# Patient Record
Sex: FEMALE | Race: WHITE | NOT HISPANIC OR LATINO | Employment: FULL TIME | URBAN - METROPOLITAN AREA
[De-identification: names, ages, dates, MRNs, and addresses within clinical notes are randomized per-mention and may not be internally consistent; named-entity substitution may affect disease eponyms.]

---

## 2017-02-07 ENCOUNTER — HOSPITAL ENCOUNTER (OUTPATIENT)
Dept: FAMILY MEDICINE CLINIC | Facility: CLINIC | Age: 52
Setting detail: SPECIMEN
Discharge: HOME | End: 2017-02-07
Attending: FAMILY MEDICINE | Admitting: FAMILY MEDICINE

## 2017-02-07 LAB
ALBUMIN SERPL-MCNC: 3.9 G/DL (ref 3.5–4.8)
ALBUMIN/GLOB SERPL: 1.6 {RATIO} (ref 1–1.7)
ALP SERPL-CCNC: 54 IU/L (ref 32–91)
ALT SERPL-CCNC: 19 IU/L (ref 14–54)
ANION GAP SERPL CALC-SCNC: 12.5 MMOL/L (ref 10–20)
AST SERPL-CCNC: 21 IU/L (ref 15–41)
BILIRUB SERPL-MCNC: 0.6 MG/DL (ref 0.3–1.2)
BUN SERPL-MCNC: 13 MG/DL (ref 8–20)
BUN/CREAT SERPL: 21.7 (ref 5.4–26.2)
CALCIUM SERPL-MCNC: 9.7 MG/DL (ref 8.9–10.3)
CHLORIDE SERPL-SCNC: 105 MMOL/L (ref 101–111)
CHOLEST SERPL-MCNC: 203 MG/DL
CHOLEST/HDLC SERPL: 3.2 {RATIO}
CONV CO2: 29 MMOL/L (ref 22–32)
CONV LDL CHOLESTEROL DIRECT: 119 MG/DL (ref 0–100)
CONV TOTAL PROTEIN: 6.3 G/DL (ref 6.1–7.9)
CREAT UR-MCNC: 0.6 MG/DL (ref 0.4–1)
GLOBULIN UR ELPH-MCNC: 2.4 G/DL (ref 2.5–3.8)
GLUCOSE SERPL-MCNC: 118 MG/DL (ref 65–99)
HDLC SERPL-MCNC: 64 MG/DL
LDLC/HDLC SERPL: 1.9 {RATIO}
LIPID INTERPRETATION: ABNORMAL
POTASSIUM SERPL-SCNC: 3.5 MMOL/L (ref 3.6–5.1)
SODIUM SERPL-SCNC: 143 MMOL/L (ref 136–144)
T3FREE SERPL-MCNC: 3.24 PG/ML (ref 2.39–6.79)
T4 FREE SERPL-MCNC: 1.32 NG/DL (ref 0.58–1.64)
TRIGL SERPL-MCNC: 78 MG/DL
TSH SERPL-ACNC: 0.07 UIU/ML (ref 0.34–5.6)
VLDLC SERPL CALC-MCNC: 20.8 MG/DL

## 2018-02-01 ENCOUNTER — HOSPITAL ENCOUNTER (OUTPATIENT)
Dept: MAMMOGRAPHY | Facility: HOSPITAL | Age: 53
Discharge: HOME | End: 2018-02-01
Attending: FAMILY MEDICINE | Admitting: FAMILY MEDICINE

## 2018-04-02 ENCOUNTER — HOSPITAL ENCOUNTER (OUTPATIENT)
Dept: FAMILY MEDICINE CLINIC | Facility: CLINIC | Age: 53
Setting detail: SPECIMEN
Discharge: HOME | End: 2018-04-02
Attending: FAMILY MEDICINE | Admitting: FAMILY MEDICINE

## 2018-04-02 LAB
ALBUMIN SERPL-MCNC: 4.1 G/DL (ref 3.5–4.8)
ALBUMIN/GLOB SERPL: 1.6 {RATIO} (ref 1–1.7)
ALP SERPL-CCNC: 59 IU/L (ref 32–91)
ALT SERPL-CCNC: 27 IU/L (ref 14–54)
ANION GAP SERPL CALC-SCNC: 11.5 MMOL/L (ref 10–20)
AST SERPL-CCNC: 25 IU/L (ref 15–41)
BILIRUB SERPL-MCNC: 0.2 MG/DL (ref 0.3–1.2)
BUN SERPL-MCNC: 13 MG/DL (ref 8–20)
BUN/CREAT SERPL: 18.6 (ref 5.4–26.2)
CALCIUM SERPL-MCNC: 9.4 MG/DL (ref 8.9–10.3)
CHLORIDE SERPL-SCNC: 101 MMOL/L (ref 101–111)
CHOLEST SERPL-MCNC: 187 MG/DL
CHOLEST/HDLC SERPL: 3.1 {RATIO}
CONV CO2: 31 MMOL/L (ref 22–32)
CONV LDL CHOLESTEROL DIRECT: 107 MG/DL (ref 0–100)
CONV TOTAL PROTEIN: 6.7 G/DL (ref 6.1–7.9)
CREAT UR-MCNC: 0.7 MG/DL (ref 0.4–1)
GLOBULIN UR ELPH-MCNC: 2.6 G/DL (ref 2.5–3.8)
GLUCOSE SERPL-MCNC: 106 MG/DL (ref 65–99)
HDLC SERPL-MCNC: 60 MG/DL
LDLC/HDLC SERPL: 1.8 {RATIO}
LIPID INTERPRETATION: ABNORMAL
POTASSIUM SERPL-SCNC: 3.5 MMOL/L (ref 3.6–5.1)
SODIUM SERPL-SCNC: 140 MMOL/L (ref 136–144)
TRIGL SERPL-MCNC: 75 MG/DL
VLDLC SERPL CALC-MCNC: 20.3 MG/DL

## 2019-04-11 ENCOUNTER — HOSPITAL ENCOUNTER (OUTPATIENT)
Dept: FAMILY MEDICINE CLINIC | Facility: CLINIC | Age: 54
Setting detail: SPECIMEN
Discharge: HOME | End: 2019-04-11
Attending: FAMILY MEDICINE | Admitting: FAMILY MEDICINE

## 2019-04-11 LAB
ALBUMIN SERPL-MCNC: 4.1 G/DL (ref 3.5–4.8)
ALBUMIN/GLOB SERPL: 1.5 {RATIO} (ref 1–1.7)
ALP SERPL-CCNC: 50 IU/L (ref 32–91)
ALT SERPL-CCNC: 14 IU/L (ref 14–54)
ANION GAP SERPL CALC-SCNC: 14.1 MMOL/L (ref 10–20)
AST SERPL-CCNC: 18 IU/L (ref 15–41)
BASOPHILS # BLD AUTO: 0 10*3/UL (ref 0–0.2)
BASOPHILS NFR BLD AUTO: 0 % (ref 0–2)
BILIRUB SERPL-MCNC: 0.7 MG/DL (ref 0.3–1.2)
BUN SERPL-MCNC: 14 MG/DL (ref 8–20)
BUN/CREAT SERPL: 20 (ref 5.4–26.2)
CALCIUM SERPL-MCNC: 9.3 MG/DL (ref 8.9–10.3)
CHLORIDE SERPL-SCNC: 102 MMOL/L (ref 101–111)
CHOLEST SERPL-MCNC: 199 MG/DL
CHOLEST/HDLC SERPL: 3.2 {RATIO}
CONV CO2: 28 MMOL/L (ref 22–32)
CONV LDL CHOLESTEROL DIRECT: 125 MG/DL (ref 0–100)
CONV TOTAL PROTEIN: 6.8 G/DL (ref 6.1–7.9)
CREAT UR-MCNC: 0.7 MG/DL (ref 0.4–1)
DIFFERENTIAL METHOD BLD: (no result)
EOSINOPHIL # BLD AUTO: 0.1 10*3/UL (ref 0–0.3)
EOSINOPHIL # BLD AUTO: 1 % (ref 0–3)
ERYTHROCYTE [DISTWIDTH] IN BLOOD BY AUTOMATED COUNT: 13.3 % (ref 11.5–14.5)
GLOBULIN UR ELPH-MCNC: 2.7 G/DL (ref 2.5–3.8)
GLUCOSE SERPL-MCNC: 113 MG/DL (ref 65–99)
HCT VFR BLD AUTO: 41.6 % (ref 35–49)
HDLC SERPL-MCNC: 62 MG/DL
HGB BLD-MCNC: 14 G/DL (ref 12–15)
LDLC/HDLC SERPL: 2 {RATIO}
LIPID INTERPRETATION: ABNORMAL
LYMPHOCYTES # BLD AUTO: 2.1 10*3/UL (ref 0.8–4.8)
LYMPHOCYTES NFR BLD AUTO: 23 % (ref 18–42)
MCH RBC QN AUTO: 31.2 PG (ref 26–32)
MCHC RBC AUTO-ENTMCNC: 33.5 G/DL (ref 32–36)
MCV RBC AUTO: 93.1 FL (ref 80–94)
MONOCYTES # BLD AUTO: 0.5 10*3/UL (ref 0.1–1.3)
MONOCYTES NFR BLD AUTO: 6 % (ref 2–11)
NEUTROPHILS # BLD AUTO: 6.6 10*3/UL (ref 2.3–8.6)
NEUTROPHILS NFR BLD AUTO: 70 % (ref 50–75)
NRBC BLD AUTO-RTO: 0 /100{WBCS}
NRBC/RBC NFR BLD MANUAL: 0 10*3/UL
PLATELET # BLD AUTO: 236 10*3/UL (ref 150–450)
PMV BLD AUTO: 10.5 FL (ref 7.4–10.4)
POTASSIUM SERPL-SCNC: 4.1 MMOL/L (ref 3.6–5.1)
RBC # BLD AUTO: 4.47 10*6/UL (ref 4–5.4)
SODIUM SERPL-SCNC: 140 MMOL/L (ref 136–144)
TRIGL SERPL-MCNC: 67 MG/DL
VLDLC SERPL CALC-MCNC: 12.6 MG/DL
WBC # BLD AUTO: 9.4 10*3/UL (ref 4.5–11.5)

## 2019-04-22 ENCOUNTER — HOSPITAL ENCOUNTER (OUTPATIENT)
Dept: MAMMOGRAPHY | Facility: HOSPITAL | Age: 54
Discharge: HOME | End: 2019-04-22

## 2019-08-24 RX ORDER — LEVOTHYROXINE SODIUM 88 UG/1
TABLET ORAL
Qty: 30 TABLET | Refills: 1 | Status: SHIPPED | OUTPATIENT
Start: 2019-08-24 | End: 2019-10-22 | Stop reason: SDUPTHER

## 2019-08-24 RX ORDER — LISINOPRIL AND HYDROCHLOROTHIAZIDE 25; 20 MG/1; MG/1
TABLET ORAL
Qty: 30 TABLET | Refills: 1 | Status: SHIPPED | OUTPATIENT
Start: 2019-08-24 | End: 2019-10-22 | Stop reason: SDUPTHER

## 2019-10-22 RX ORDER — LISINOPRIL AND HYDROCHLOROTHIAZIDE 25; 20 MG/1; MG/1
TABLET ORAL
Qty: 30 TABLET | Refills: 0 | Status: SHIPPED | OUTPATIENT
Start: 2019-10-22 | End: 2019-11-22 | Stop reason: SDUPTHER

## 2019-10-22 RX ORDER — LEVOTHYROXINE SODIUM 88 UG/1
TABLET ORAL
Qty: 30 TABLET | Refills: 0 | Status: SHIPPED | OUTPATIENT
Start: 2019-10-22 | End: 2019-11-22 | Stop reason: SDUPTHER

## 2019-11-23 RX ORDER — LISINOPRIL AND HYDROCHLOROTHIAZIDE 25; 20 MG/1; MG/1
TABLET ORAL
Qty: 30 TABLET | Refills: 0 | Status: SHIPPED | OUTPATIENT
Start: 2019-11-23 | End: 2019-12-19

## 2019-11-23 RX ORDER — LEVOTHYROXINE SODIUM 88 UG/1
TABLET ORAL
Qty: 30 TABLET | Refills: 0 | Status: SHIPPED | OUTPATIENT
Start: 2019-11-23 | End: 2019-12-19

## 2019-12-19 RX ORDER — LEVOTHYROXINE SODIUM 88 UG/1
TABLET ORAL
Qty: 30 TABLET | Refills: 0 | Status: SHIPPED | OUTPATIENT
Start: 2019-12-19 | End: 2020-01-21

## 2019-12-19 RX ORDER — LISINOPRIL AND HYDROCHLOROTHIAZIDE 25; 20 MG/1; MG/1
TABLET ORAL
Qty: 30 TABLET | Refills: 0 | Status: SHIPPED | OUTPATIENT
Start: 2019-12-19 | End: 2020-01-21

## 2020-01-21 RX ORDER — LISINOPRIL AND HYDROCHLOROTHIAZIDE 25; 20 MG/1; MG/1
TABLET ORAL
Qty: 30 TABLET | Refills: 0 | Status: SHIPPED | OUTPATIENT
Start: 2020-01-21 | End: 2020-02-27 | Stop reason: SDUPTHER

## 2020-01-21 RX ORDER — LEVOTHYROXINE SODIUM 88 UG/1
TABLET ORAL
Qty: 30 TABLET | Refills: 0 | Status: SHIPPED | OUTPATIENT
Start: 2020-01-21 | End: 2020-02-21

## 2020-02-21 RX ORDER — LEVOTHYROXINE SODIUM 88 UG/1
TABLET ORAL
Qty: 30 TABLET | Refills: 0 | Status: SHIPPED | OUTPATIENT
Start: 2020-02-21 | End: 2020-03-19

## 2020-02-27 RX ORDER — LISINOPRIL AND HYDROCHLOROTHIAZIDE 25; 20 MG/1; MG/1
1 TABLET ORAL DAILY
Qty: 30 TABLET | Refills: 3 | Status: SHIPPED | OUTPATIENT
Start: 2020-02-27 | End: 2020-06-21

## 2020-02-28 ENCOUNTER — OFFICE VISIT (OUTPATIENT)
Dept: FAMILY MEDICINE CLINIC | Facility: CLINIC | Age: 55
End: 2020-02-28

## 2020-02-28 ENCOUNTER — LAB (OUTPATIENT)
Dept: FAMILY MEDICINE CLINIC | Facility: CLINIC | Age: 55
End: 2020-02-28

## 2020-02-28 VITALS
HEIGHT: 62 IN | DIASTOLIC BLOOD PRESSURE: 68 MMHG | RESPIRATION RATE: 16 BRPM | TEMPERATURE: 98 F | OXYGEN SATURATION: 95 % | HEART RATE: 86 BPM | WEIGHT: 153.2 LBS | BODY MASS INDEX: 28.19 KG/M2 | SYSTOLIC BLOOD PRESSURE: 111 MMHG

## 2020-02-28 DIAGNOSIS — I10 ESSENTIAL HYPERTENSION: Primary | ICD-10-CM

## 2020-02-28 DIAGNOSIS — E03.9 HYPOTHYROIDISM, UNSPECIFIED TYPE: ICD-10-CM

## 2020-02-28 DIAGNOSIS — I10 ESSENTIAL HYPERTENSION: ICD-10-CM

## 2020-02-28 DIAGNOSIS — F17.200 TOBACCO DEPENDENCE: ICD-10-CM

## 2020-02-28 LAB
ALBUMIN SERPL-MCNC: 4.3 G/DL (ref 3.5–5.2)
ALBUMIN/GLOB SERPL: 2 G/DL
ALP SERPL-CCNC: 50 U/L (ref 39–117)
ALT SERPL W P-5'-P-CCNC: 15 U/L (ref 1–33)
ANION GAP SERPL CALCULATED.3IONS-SCNC: 10.9 MMOL/L (ref 5–15)
AST SERPL-CCNC: 15 U/L (ref 1–32)
BILIRUB SERPL-MCNC: 0.4 MG/DL (ref 0.2–1.2)
BUN BLD-MCNC: 17 MG/DL (ref 6–20)
BUN/CREAT SERPL: 29.8 (ref 7–25)
CALCIUM SPEC-SCNC: 9.2 MG/DL (ref 8.6–10.5)
CHLORIDE SERPL-SCNC: 103 MMOL/L (ref 98–107)
CHOLEST SERPL-MCNC: 178 MG/DL (ref 0–200)
CO2 SERPL-SCNC: 27.1 MMOL/L (ref 22–29)
CREAT BLD-MCNC: 0.57 MG/DL (ref 0.57–1)
GFR SERPL CREATININE-BSD FRML MDRD: 110 ML/MIN/1.73
GLOBULIN UR ELPH-MCNC: 2.2 GM/DL
GLUCOSE BLD-MCNC: 116 MG/DL (ref 65–99)
HDLC SERPL-MCNC: 61 MG/DL (ref 40–60)
LDLC SERPL CALC-MCNC: 100 MG/DL (ref 0–100)
LDLC/HDLC SERPL: 1.64 {RATIO}
POTASSIUM BLD-SCNC: 3.7 MMOL/L (ref 3.5–5.2)
PROT SERPL-MCNC: 6.5 G/DL (ref 6–8.5)
SODIUM BLD-SCNC: 141 MMOL/L (ref 136–145)
TRIGL SERPL-MCNC: 84 MG/DL (ref 0–150)
TSH SERPL DL<=0.05 MIU/L-ACNC: 1.05 UIU/ML (ref 0.27–4.2)
VLDLC SERPL-MCNC: 16.8 MG/DL (ref 5–40)

## 2020-02-28 PROCEDURE — 80061 LIPID PANEL: CPT | Performed by: NURSE PRACTITIONER

## 2020-02-28 PROCEDURE — 80053 COMPREHEN METABOLIC PANEL: CPT | Performed by: NURSE PRACTITIONER

## 2020-02-28 PROCEDURE — 84443 ASSAY THYROID STIM HORMONE: CPT | Performed by: NURSE PRACTITIONER

## 2020-02-28 PROCEDURE — 99214 OFFICE O/P EST MOD 30 MIN: CPT | Performed by: NURSE PRACTITIONER

## 2020-02-28 PROCEDURE — 36415 COLL VENOUS BLD VENIPUNCTURE: CPT

## 2020-02-28 NOTE — PROGRESS NOTES
Subjective   Kirby Gunter is a 54 y.o. female.       HPI   Pt. Is here today to follow up on current medications.    1) HTN - currently takes lisinopril-hctz 20/25 daily.  BP running in normal range at home;  BP today 111/68.  Denies any Cp; palpitations; SOA; dizziness; headache; trouble with vision.  Continues to smoke.  Limits caffeine and sodium intake.      2) Hypothyroid - currently takes levothyroxine 88 mcg daily.  No concerns. Denies any weight changes; fatigue; skin changes.     3) Cigarette smoker - continue to smoke but feels she might be ready to quit.  Have reviewed smoking cessation options.  Pt. Considering trying nicotine gum.      The following portions of the patient's history were reviewed and updated as appropriate: allergies, current medications, past family history, past medical history, past social history, past surgical history and problem list.    Review of Systems   Constitutional: Negative for activity change, appetite change, chills, diaphoresis, fatigue, fever, unexpected weight gain and unexpected weight loss.   HENT: Negative for congestion, postnasal drip, rhinorrhea, sinus pressure, sore throat and trouble swallowing.    Eyes: Negative for blurred vision, double vision and visual disturbance.   Respiratory: Negative for cough, chest tightness, shortness of breath and wheezing.    Cardiovascular: Negative for chest pain, palpitations and leg swelling.   Gastrointestinal: Negative for abdominal pain, blood in stool, constipation, diarrhea, nausea, vomiting and indigestion.   Genitourinary: Negative for dysuria, flank pain, frequency, hematuria and urgency.   Musculoskeletal: Negative for arthralgias and myalgias.   Skin: Negative for dry skin, rash and skin lesions.   Neurological: Negative for dizziness, syncope, weakness, light-headedness, headache and confusion.   Hematological: Negative for adenopathy.   Psychiatric/Behavioral: Negative for depressed mood. The patient is not  nervous/anxious.        Objective   Physical Exam   Constitutional: She is oriented to person, place, and time. She appears well-developed and well-nourished. No distress.   HENT:   Head: Normocephalic and atraumatic.   Right Ear: Hearing, external ear and ear canal normal.   Left Ear: Hearing, external ear and ear canal normal.   Nose: Nose normal.   Mouth/Throat: Uvula is midline, oropharynx is clear and moist and mucous membranes are normal.   Eyes: Pupils are equal, round, and reactive to light. Conjunctivae and EOM are normal.   Neck: No JVD present. No thyromegaly present.   Cardiovascular: Normal rate, regular rhythm, normal heart sounds and intact distal pulses.   No murmur heard.  Pulmonary/Chest: Effort normal and breath sounds normal. No respiratory distress. She has no wheezes. She exhibits no tenderness.   Abdominal: Soft. Bowel sounds are normal. She exhibits no distension. There is no tenderness.   Musculoskeletal: She exhibits no edema.   Lymphadenopathy:     She has no cervical adenopathy.   Neurological: She is alert and oriented to person, place, and time.   Skin: Skin is warm and dry. Capillary refill takes less than 2 seconds. No rash noted. No erythema.   Psychiatric: She has a normal mood and affect.   Vitals reviewed.        Assessment/Plan   Kirby was seen today for hypertension and thyroid problem.    Diagnoses and all orders for this visit:    Essential hypertension  Comments:  Stable  Labs today  Cont. current medication  Work on smoking cessation  Orders:  -     Comprehensive metabolic panel; Future  -     Lipid panel; Future    Hypothyroidism, unspecified type  Comments:  Stable  Labs today  Continue current medication    Orders:  -     TSH; Future    Tobacco dependence  Comments:  Advised smoking cessation  Reviewed smoking cessation options  Pt. wants to try nicotine gum

## 2020-03-19 RX ORDER — LEVOTHYROXINE SODIUM 88 UG/1
TABLET ORAL
Qty: 30 TABLET | Refills: 3 | Status: SHIPPED | OUTPATIENT
Start: 2020-03-19 | End: 2020-07-19

## 2020-06-21 RX ORDER — LISINOPRIL AND HYDROCHLOROTHIAZIDE 25; 20 MG/1; MG/1
TABLET ORAL
Qty: 30 TABLET | Refills: 4 | Status: SHIPPED | OUTPATIENT
Start: 2020-06-21 | End: 2020-11-17

## 2020-07-19 RX ORDER — LEVOTHYROXINE SODIUM 88 UG/1
TABLET ORAL
Qty: 30 TABLET | Refills: 2 | Status: SHIPPED | OUTPATIENT
Start: 2020-07-19 | End: 2020-10-17

## 2020-08-28 ENCOUNTER — OFFICE VISIT (OUTPATIENT)
Dept: FAMILY MEDICINE CLINIC | Facility: CLINIC | Age: 55
End: 2020-08-28

## 2020-08-28 ENCOUNTER — LAB (OUTPATIENT)
Dept: FAMILY MEDICINE CLINIC | Facility: CLINIC | Age: 55
End: 2020-08-28

## 2020-08-28 VITALS
HEIGHT: 62 IN | DIASTOLIC BLOOD PRESSURE: 76 MMHG | OXYGEN SATURATION: 95 % | WEIGHT: 149 LBS | TEMPERATURE: 97.1 F | HEART RATE: 80 BPM | SYSTOLIC BLOOD PRESSURE: 122 MMHG | BODY MASS INDEX: 27.42 KG/M2

## 2020-08-28 DIAGNOSIS — I10 ESSENTIAL HYPERTENSION: Primary | ICD-10-CM

## 2020-08-28 DIAGNOSIS — B09 VIRAL RASH: ICD-10-CM

## 2020-08-28 DIAGNOSIS — E03.9 HYPOTHYROIDISM, UNSPECIFIED TYPE: ICD-10-CM

## 2020-08-28 LAB
ALBUMIN SERPL-MCNC: 4.1 G/DL (ref 3.5–5.2)
ALBUMIN/GLOB SERPL: 1.6 G/DL
ALP SERPL-CCNC: 49 U/L (ref 39–117)
ALT SERPL W P-5'-P-CCNC: 12 U/L (ref 1–33)
ANION GAP SERPL CALCULATED.3IONS-SCNC: 10 MMOL/L (ref 5–15)
AST SERPL-CCNC: 12 U/L (ref 1–32)
BASOPHILS # BLD AUTO: 0.04 10*3/MM3 (ref 0–0.2)
BASOPHILS NFR BLD AUTO: 0.5 % (ref 0–1.5)
BILIRUB SERPL-MCNC: 0.3 MG/DL (ref 0–1.2)
BUN SERPL-MCNC: 17 MG/DL (ref 6–20)
BUN/CREAT SERPL: 28.8 (ref 7–25)
CALCIUM SPEC-SCNC: 9.4 MG/DL (ref 8.6–10.5)
CHLORIDE SERPL-SCNC: 105 MMOL/L (ref 98–107)
CHOLEST SERPL-MCNC: 158 MG/DL (ref 0–200)
CO2 SERPL-SCNC: 27 MMOL/L (ref 22–29)
CREAT SERPL-MCNC: 0.59 MG/DL (ref 0.57–1)
DEPRECATED RDW RBC AUTO: 40.4 FL (ref 37–54)
EOSINOPHIL # BLD AUTO: 0.14 10*3/MM3 (ref 0–0.4)
EOSINOPHIL NFR BLD AUTO: 1.7 % (ref 0.3–6.2)
ERYTHROCYTE [DISTWIDTH] IN BLOOD BY AUTOMATED COUNT: 12.3 % (ref 12.3–15.4)
GFR SERPL CREATININE-BSD FRML MDRD: 105 ML/MIN/1.73
GLOBULIN UR ELPH-MCNC: 2.6 GM/DL
GLUCOSE SERPL-MCNC: 106 MG/DL (ref 65–99)
HCT VFR BLD AUTO: 37.5 % (ref 34–46.6)
HDLC SERPL-MCNC: 55 MG/DL (ref 40–60)
HGB BLD-MCNC: 12.7 G/DL (ref 12–15.9)
IMM GRANULOCYTES # BLD AUTO: 0.02 10*3/MM3 (ref 0–0.05)
IMM GRANULOCYTES NFR BLD AUTO: 0.2 % (ref 0–0.5)
LDLC SERPL CALC-MCNC: 90 MG/DL (ref 0–100)
LDLC/HDLC SERPL: 1.64 {RATIO}
LYMPHOCYTES # BLD AUTO: 2.58 10*3/MM3 (ref 0.7–3.1)
LYMPHOCYTES NFR BLD AUTO: 30.5 % (ref 19.6–45.3)
MCH RBC QN AUTO: 30.5 PG (ref 26.6–33)
MCHC RBC AUTO-ENTMCNC: 33.9 G/DL (ref 31.5–35.7)
MCV RBC AUTO: 90.1 FL (ref 79–97)
MONOCYTES # BLD AUTO: 0.64 10*3/MM3 (ref 0.1–0.9)
MONOCYTES NFR BLD AUTO: 7.6 % (ref 5–12)
NEUTROPHILS NFR BLD AUTO: 5.05 10*3/MM3 (ref 1.7–7)
NEUTROPHILS NFR BLD AUTO: 59.5 % (ref 42.7–76)
NRBC BLD AUTO-RTO: 0 /100 WBC (ref 0–0.2)
PLATELET # BLD AUTO: 214 10*3/MM3 (ref 140–450)
PMV BLD AUTO: 12.4 FL (ref 6–12)
POTASSIUM SERPL-SCNC: 3.6 MMOL/L (ref 3.5–5.2)
PROT SERPL-MCNC: 6.7 G/DL (ref 6–8.5)
RBC # BLD AUTO: 4.16 10*6/MM3 (ref 3.77–5.28)
SODIUM SERPL-SCNC: 142 MMOL/L (ref 136–145)
T3FREE SERPL-MCNC: 2.62 PG/ML (ref 2–4.4)
T4 FREE SERPL-MCNC: 1.8 NG/DL (ref 0.93–1.7)
TRIGL SERPL-MCNC: 65 MG/DL (ref 0–150)
TSH SERPL DL<=0.05 MIU/L-ACNC: 0.57 UIU/ML (ref 0.27–4.2)
VLDLC SERPL-MCNC: 13 MG/DL (ref 5–40)
WBC # BLD AUTO: 8.47 10*3/MM3 (ref 3.4–10.8)

## 2020-08-28 PROCEDURE — 84439 ASSAY OF FREE THYROXINE: CPT | Performed by: FAMILY MEDICINE

## 2020-08-28 PROCEDURE — 99213 OFFICE O/P EST LOW 20 MIN: CPT | Performed by: FAMILY MEDICINE

## 2020-08-28 PROCEDURE — 85025 COMPLETE CBC W/AUTO DIFF WBC: CPT | Performed by: FAMILY MEDICINE

## 2020-08-28 PROCEDURE — 80061 LIPID PANEL: CPT | Performed by: FAMILY MEDICINE

## 2020-08-28 PROCEDURE — 36415 COLL VENOUS BLD VENIPUNCTURE: CPT | Performed by: FAMILY MEDICINE

## 2020-08-28 PROCEDURE — 84481 FREE ASSAY (FT-3): CPT | Performed by: FAMILY MEDICINE

## 2020-08-28 PROCEDURE — 84443 ASSAY THYROID STIM HORMONE: CPT | Performed by: FAMILY MEDICINE

## 2020-08-28 PROCEDURE — 80053 COMPREHEN METABOLIC PANEL: CPT | Performed by: FAMILY MEDICINE

## 2020-08-28 RX ORDER — ACYCLOVIR 50 MG/G
OINTMENT TOPICAL 4 TIMES DAILY
Qty: 15 G | Refills: 1 | Status: SHIPPED | OUTPATIENT
Start: 2020-08-28

## 2020-08-28 NOTE — PROGRESS NOTES
"Subjective   Kirby Gunter is a 55 y.o. female.     She is in for follow-up on her high blood pressure and hypothyroidism.  She has been taking her medication and feels good in general.  She denies any issue with bowel or bladder function.  She denies any chest pain or difficulty breathing.  She denies any issue with sleep or mood.  She denies any dizziness or lightheadedness.  She states she is independent and functional with all of her ADLs.  She denies any fever or illness symptoms.  She has noticed a mild recurring rash on the palmar surface of her left hand, which she describes as blisters and painful at times.         /76 (BP Location: Left arm, Patient Position: Sitting, Cuff Size: Small Adult)   Pulse 80   Temp 97.1 °F (36.2 °C) (Infrared)   Ht 157.5 cm (62\")   Wt 67.6 kg (149 lb)   SpO2 95%   BMI 27.25 kg/m²       Chief Complaint   Patient presents with   • Hypertension     f/u on bp and thyroid    • Hypothyroidism           Current Outpatient Medications:   •  levothyroxine (SYNTHROID, LEVOTHROID) 88 MCG tablet, TAKE ONE TABLET BY MOUTH DAILY, Disp: 30 tablet, Rfl: 2  •  lisinopril-hydrochlorothiazide (PRINZIDE,ZESTORETIC) 20-25 MG per tablet, TAKE ONE TABLET BY MOUTH DAILY, Disp: 30 tablet, Rfl: 4        The following portions of the patient's history were reviewed and updated as appropriate: allergies, current medications, past family history, past medical history, past social history, past surgical history and problem list.    Review of Systems   Constitutional: Negative for activity change, fatigue and fever.   HENT: Negative for congestion, sinus pressure, sinus pain, sore throat and trouble swallowing.    Eyes: Negative for visual disturbance.   Respiratory: Negative for chest tightness, shortness of breath and wheezing.    Cardiovascular: Negative for chest pain.   Gastrointestinal: Negative for abdominal distention, abdominal pain, constipation, diarrhea, nausea and vomiting. "   Genitourinary: Negative for difficulty urinating and dysuria.   Musculoskeletal: Negative for back pain and neck pain.   Skin: Positive for rash (Left hand only).   Psychiatric/Behavioral: Negative for agitation, hallucinations and suicidal ideas.       Objective   Physical Exam   Constitutional: She is oriented to person, place, and time. No distress.   Neck: Neck supple. No thyromegaly present.   Cardiovascular: Normal rate, regular rhythm and normal heart sounds.   Pulmonary/Chest: Effort normal and breath sounds normal.   Abdominal: Soft. Bowel sounds are normal.   Musculoskeletal: She exhibits no edema or deformity.   Lymphadenopathy:     She has no cervical adenopathy.   Neurological: She is alert and oriented to person, place, and time.   Skin: Rash (Viral blisters on palmar surface of her left hand) noted.   Nursing note and vitals reviewed.        Assessment/Plan   Problems Addressed this Visit        Cardiovascular and Mediastinum    Essential hypertension - Primary    Relevant Orders    Comprehensive metabolic panel    Lipid panel       Endocrine    Hypothyroidism    Relevant Orders    TSH    T4, free    T3, free    CBC w AUTO Differential      Other Visit Diagnoses     Viral rash            I will update appropriate labs today and adjust treatment plan as indicated  I will send in a prescription for topical antiviral for the rash on her hand, as I think it may be zoster but I am not sure  She is to call if there are new issues or if the rash does not resolve as intended  Otherwise, I will see her back in 6 months

## 2020-10-17 RX ORDER — LEVOTHYROXINE SODIUM 88 UG/1
TABLET ORAL
Qty: 30 TABLET | Refills: 1 | Status: SHIPPED | OUTPATIENT
Start: 2020-10-17 | End: 2020-12-15

## 2020-11-17 RX ORDER — LISINOPRIL AND HYDROCHLOROTHIAZIDE 25; 20 MG/1; MG/1
TABLET ORAL
Qty: 30 TABLET | Refills: 3 | Status: SHIPPED | OUTPATIENT
Start: 2020-11-17 | End: 2021-03-17

## 2020-12-15 RX ORDER — LEVOTHYROXINE SODIUM 88 UG/1
TABLET ORAL
Qty: 30 TABLET | Refills: 3 | Status: SHIPPED | OUTPATIENT
Start: 2020-12-15 | End: 2021-04-16

## 2021-03-01 ENCOUNTER — OFFICE VISIT (OUTPATIENT)
Dept: FAMILY MEDICINE CLINIC | Facility: CLINIC | Age: 56
End: 2021-03-01

## 2021-03-01 ENCOUNTER — LAB (OUTPATIENT)
Dept: FAMILY MEDICINE CLINIC | Facility: CLINIC | Age: 56
End: 2021-03-01

## 2021-03-01 VITALS
DIASTOLIC BLOOD PRESSURE: 82 MMHG | HEART RATE: 80 BPM | BODY MASS INDEX: 27.05 KG/M2 | TEMPERATURE: 97.8 F | OXYGEN SATURATION: 94 % | SYSTOLIC BLOOD PRESSURE: 147 MMHG | WEIGHT: 147 LBS | HEIGHT: 62 IN

## 2021-03-01 DIAGNOSIS — I10 ESSENTIAL HYPERTENSION: Primary | ICD-10-CM

## 2021-03-01 DIAGNOSIS — E03.9 HYPOTHYROIDISM, UNSPECIFIED TYPE: ICD-10-CM

## 2021-03-01 LAB
ALBUMIN SERPL-MCNC: 4.2 G/DL (ref 3.5–5.2)
ALBUMIN/GLOB SERPL: 1.8 G/DL
ALP SERPL-CCNC: 57 U/L (ref 39–117)
ALT SERPL W P-5'-P-CCNC: 18 U/L (ref 1–33)
ANION GAP SERPL CALCULATED.3IONS-SCNC: 7.6 MMOL/L (ref 5–15)
AST SERPL-CCNC: 15 U/L (ref 1–32)
BASOPHILS # BLD AUTO: 0.05 10*3/MM3 (ref 0–0.2)
BASOPHILS NFR BLD AUTO: 0.5 % (ref 0–1.5)
BILIRUB SERPL-MCNC: 0.3 MG/DL (ref 0–1.2)
BUN SERPL-MCNC: 18 MG/DL (ref 6–20)
BUN/CREAT SERPL: 26.1 (ref 7–25)
CALCIUM SPEC-SCNC: 9.4 MG/DL (ref 8.6–10.5)
CHLORIDE SERPL-SCNC: 103 MMOL/L (ref 98–107)
CHOLEST SERPL-MCNC: 169 MG/DL (ref 0–200)
CO2 SERPL-SCNC: 31.4 MMOL/L (ref 22–29)
CREAT SERPL-MCNC: 0.69 MG/DL (ref 0.57–1)
DEPRECATED RDW RBC AUTO: 39.9 FL (ref 37–54)
EOSINOPHIL # BLD AUTO: 0.16 10*3/MM3 (ref 0–0.4)
EOSINOPHIL NFR BLD AUTO: 1.6 % (ref 0.3–6.2)
ERYTHROCYTE [DISTWIDTH] IN BLOOD BY AUTOMATED COUNT: 12.1 % (ref 12.3–15.4)
GFR SERPL CREATININE-BSD FRML MDRD: 88 ML/MIN/1.73
GLOBULIN UR ELPH-MCNC: 2.3 GM/DL
GLUCOSE SERPL-MCNC: 99 MG/DL (ref 65–99)
HCT VFR BLD AUTO: 41.2 % (ref 34–46.6)
HDLC SERPL-MCNC: 66 MG/DL (ref 40–60)
HGB BLD-MCNC: 13.7 G/DL (ref 12–15.9)
IMM GRANULOCYTES # BLD AUTO: 0.03 10*3/MM3 (ref 0–0.05)
IMM GRANULOCYTES NFR BLD AUTO: 0.3 % (ref 0–0.5)
LDLC SERPL CALC-MCNC: 91 MG/DL (ref 0–100)
LDLC/HDLC SERPL: 1.38 {RATIO}
LYMPHOCYTES # BLD AUTO: 2.49 10*3/MM3 (ref 0.7–3.1)
LYMPHOCYTES NFR BLD AUTO: 25.6 % (ref 19.6–45.3)
MCH RBC QN AUTO: 30.6 PG (ref 26.6–33)
MCHC RBC AUTO-ENTMCNC: 33.3 G/DL (ref 31.5–35.7)
MCV RBC AUTO: 92.2 FL (ref 79–97)
MONOCYTES # BLD AUTO: 0.69 10*3/MM3 (ref 0.1–0.9)
MONOCYTES NFR BLD AUTO: 7.1 % (ref 5–12)
NEUTROPHILS NFR BLD AUTO: 6.31 10*3/MM3 (ref 1.7–7)
NEUTROPHILS NFR BLD AUTO: 64.9 % (ref 42.7–76)
NRBC BLD AUTO-RTO: 0 /100 WBC (ref 0–0.2)
PLATELET # BLD AUTO: 235 10*3/MM3 (ref 140–450)
PMV BLD AUTO: 12 FL (ref 6–12)
POTASSIUM SERPL-SCNC: 3.8 MMOL/L (ref 3.5–5.2)
PROT SERPL-MCNC: 6.5 G/DL (ref 6–8.5)
RBC # BLD AUTO: 4.47 10*6/MM3 (ref 3.77–5.28)
SODIUM SERPL-SCNC: 142 MMOL/L (ref 136–145)
T3FREE SERPL-MCNC: 2.43 PG/ML (ref 2–4.4)
T4 FREE SERPL-MCNC: 1.4 NG/DL (ref 0.93–1.7)
TRIGL SERPL-MCNC: 61 MG/DL (ref 0–150)
TSH SERPL DL<=0.05 MIU/L-ACNC: 0.52 UIU/ML (ref 0.27–4.2)
VLDLC SERPL-MCNC: 12 MG/DL (ref 5–40)
WBC # BLD AUTO: 9.73 10*3/MM3 (ref 3.4–10.8)

## 2021-03-01 PROCEDURE — 84443 ASSAY THYROID STIM HORMONE: CPT | Performed by: FAMILY MEDICINE

## 2021-03-01 PROCEDURE — 84439 ASSAY OF FREE THYROXINE: CPT | Performed by: FAMILY MEDICINE

## 2021-03-01 PROCEDURE — 84481 FREE ASSAY (FT-3): CPT | Performed by: FAMILY MEDICINE

## 2021-03-01 PROCEDURE — 80053 COMPREHEN METABOLIC PANEL: CPT | Performed by: FAMILY MEDICINE

## 2021-03-01 PROCEDURE — 80061 LIPID PANEL: CPT | Performed by: FAMILY MEDICINE

## 2021-03-01 PROCEDURE — 99213 OFFICE O/P EST LOW 20 MIN: CPT | Performed by: FAMILY MEDICINE

## 2021-03-01 PROCEDURE — 85025 COMPLETE CBC W/AUTO DIFF WBC: CPT | Performed by: FAMILY MEDICINE

## 2021-03-01 PROCEDURE — 36415 COLL VENOUS BLD VENIPUNCTURE: CPT | Performed by: FAMILY MEDICINE

## 2021-03-01 NOTE — PROGRESS NOTES
"Subjective   Kirby Gunter is a 55 y.o. female.     She is in today for follow-up on high blood pressure and hypothyroidism issues.  She is due for labs today.  She is not yet eligible for Covid vaccine but is ready to make an appointment as soon as she becomes eligible.  She denies any chest pain or headaches.  She denies any shortness of breath.  She denies any issue with energy level.  She denies any issue with bowel or bladder function.  She denies any dizziness or lightheadedness.  She feels she gets adequate sleep.         /82 (BP Location: Left arm, Patient Position: Sitting, Cuff Size: Small Adult)   Pulse 80   Temp 97.8 °F (36.6 °C) (Infrared)   Ht 157.5 cm (62\")   Wt 66.7 kg (147 lb)   SpO2 94%   BMI 26.89 kg/m²       Chief Complaint   Patient presents with   • Hypertension     6 month follow up    • Hypothyroidism           Current Outpatient Medications:   •  acyclovir (Zovirax) 5 % ointment, Apply  topically to the appropriate area as directed 4 (Four) Times a Day., Disp: 15 g, Rfl: 1  •  levothyroxine (SYNTHROID, LEVOTHROID) 88 MCG tablet, TAKE ONE TABLET BY MOUTH DAILY, Disp: 30 tablet, Rfl: 3  •  lisinopril-hydrochlorothiazide (PRINZIDE,ZESTORETIC) 20-25 MG per tablet, TAKE ONE TABLET BY MOUTH DAILY, Disp: 30 tablet, Rfl: 3        The following portions of the patient's history were reviewed and updated as appropriate: allergies, current medications, past family history, past medical history, past social history, past surgical history and problem list.    Review of Systems   Constitutional: Negative for activity change, fatigue and fever.   HENT: Negative for congestion, sinus pressure, sinus pain, sore throat and trouble swallowing.    Eyes: Negative for visual disturbance.   Respiratory: Negative for chest tightness, shortness of breath and wheezing.    Cardiovascular: Negative for chest pain.   Gastrointestinal: Negative for abdominal distention, abdominal pain, constipation, " diarrhea, nausea and vomiting.   Genitourinary: Negative for difficulty urinating and dysuria.   Musculoskeletal: Negative for back pain and neck pain.   Skin: Negative for rash.   Psychiatric/Behavioral: Negative for agitation, hallucinations and suicidal ideas.       Objective   Physical Exam  Vitals signs and nursing note reviewed.   Constitutional:       Appearance: Normal appearance.   Neck:      Musculoskeletal: Neck supple.   Cardiovascular:      Rate and Rhythm: Normal rate and regular rhythm.      Heart sounds: Normal heart sounds. No murmur.   Pulmonary:      Effort: Pulmonary effort is normal.      Breath sounds: No wheezing or rales.   Abdominal:      General: Bowel sounds are normal.      Palpations: Abdomen is soft.      Tenderness: There is no abdominal tenderness. There is no guarding.   Musculoskeletal:      Right lower leg: No edema.      Left lower leg: No edema.   Lymphadenopathy:      Cervical: No cervical adenopathy.   Skin:     Findings: No rash.   Neurological:      General: No focal deficit present.      Mental Status: She is alert and oriented to person, place, and time.   Psychiatric:         Mood and Affect: Mood normal.           Assessment/Plan   Problems Addressed this Visit        Cardiac and Vasculature    Essential hypertension - Primary    Relevant Orders    Comprehensive metabolic panel    Lipid panel       Endocrine and Metabolic    Hypothyroidism    Relevant Orders    T3, free    T4, free    TSH    CBC w AUTO Differential      Diagnoses       Codes Comments    Essential hypertension    -  Primary ICD-10-CM: I10  ICD-9-CM: 401.9     Hypothyroidism, unspecified type     ICD-10-CM: E03.9  ICD-9-CM: 244.9         I will update labs and adjust treatment plan as indicated  I will see her again in 6 months, sooner if needed  I encouraged her to continue to follow good diet and activity habits moving forward

## 2021-03-17 RX ORDER — LISINOPRIL AND HYDROCHLOROTHIAZIDE 25; 20 MG/1; MG/1
TABLET ORAL
Qty: 30 TABLET | Refills: 2 | Status: SHIPPED | OUTPATIENT
Start: 2021-03-17 | End: 2021-06-16

## 2021-04-15 DIAGNOSIS — E03.9 ACQUIRED HYPOTHYROIDISM: Primary | ICD-10-CM

## 2021-04-16 RX ORDER — LEVOTHYROXINE SODIUM 88 UG/1
88 TABLET ORAL
Qty: 90 TABLET | Refills: 1 | Status: SHIPPED | OUTPATIENT
Start: 2021-04-16 | End: 2021-10-13

## 2021-04-16 NOTE — TELEPHONE ENCOUNTER
Lab Results   Component Value Date    TSH 0.518 03/01/2021     Continue levothyroxine 88 mcg daily.

## 2021-06-04 ENCOUNTER — TRANSCRIBE ORDERS (OUTPATIENT)
Dept: ADMINISTRATIVE | Facility: HOSPITAL | Age: 56
End: 2021-06-04

## 2021-06-04 DIAGNOSIS — Z12.31 ENCOUNTER FOR SCREENING MAMMOGRAM FOR MALIGNANT NEOPLASM OF BREAST: Primary | ICD-10-CM

## 2021-06-10 ENCOUNTER — HOSPITAL ENCOUNTER (OUTPATIENT)
Dept: MAMMOGRAPHY | Facility: HOSPITAL | Age: 56
Discharge: HOME | End: 2021-06-10
Admitting: FAMILY MEDICINE

## 2021-06-10 ENCOUNTER — TELEPHONE (OUTPATIENT)
Dept: FAMILY MEDICINE CLINIC | Facility: CLINIC | Age: 56
End: 2021-06-10

## 2021-06-10 DIAGNOSIS — Z12.31 ENCOUNTER FOR SCREENING MAMMOGRAM FOR MALIGNANT NEOPLASM OF BREAST: ICD-10-CM

## 2021-06-10 PROCEDURE — 77063 BREAST TOMOSYNTHESIS BI: CPT

## 2021-06-10 PROCEDURE — 77067 SCR MAMMO BI INCL CAD: CPT

## 2021-06-16 RX ORDER — LISINOPRIL AND HYDROCHLOROTHIAZIDE 25; 20 MG/1; MG/1
TABLET ORAL
Qty: 30 TABLET | Refills: 3 | Status: SHIPPED | OUTPATIENT
Start: 2021-06-16 | End: 2021-10-13

## 2021-09-01 ENCOUNTER — LAB (OUTPATIENT)
Dept: FAMILY MEDICINE CLINIC | Facility: CLINIC | Age: 56
End: 2021-09-01

## 2021-09-01 ENCOUNTER — OFFICE VISIT (OUTPATIENT)
Dept: FAMILY MEDICINE CLINIC | Facility: CLINIC | Age: 56
End: 2021-09-01

## 2021-09-01 VITALS
SYSTOLIC BLOOD PRESSURE: 138 MMHG | OXYGEN SATURATION: 93 % | BODY MASS INDEX: 26.34 KG/M2 | TEMPERATURE: 98.4 F | HEART RATE: 79 BPM | WEIGHT: 144 LBS | DIASTOLIC BLOOD PRESSURE: 85 MMHG

## 2021-09-01 DIAGNOSIS — I10 ESSENTIAL HYPERTENSION: Primary | ICD-10-CM

## 2021-09-01 DIAGNOSIS — E03.9 ACQUIRED HYPOTHYROIDISM: ICD-10-CM

## 2021-09-01 LAB
ALBUMIN SERPL-MCNC: 4.2 G/DL (ref 3.5–5.2)
ALBUMIN/GLOB SERPL: 1.6 G/DL
ALP SERPL-CCNC: 57 U/L (ref 39–117)
ALT SERPL W P-5'-P-CCNC: 16 U/L (ref 1–33)
ANION GAP SERPL CALCULATED.3IONS-SCNC: 7 MMOL/L (ref 5–15)
AST SERPL-CCNC: 14 U/L (ref 1–32)
BASOPHILS # BLD AUTO: 0.05 10*3/MM3 (ref 0–0.2)
BASOPHILS NFR BLD AUTO: 0.5 % (ref 0–1.5)
BILIRUB SERPL-MCNC: 0.3 MG/DL (ref 0–1.2)
BUN SERPL-MCNC: 14 MG/DL (ref 6–20)
BUN/CREAT SERPL: 19.4 (ref 7–25)
CALCIUM SPEC-SCNC: 9.5 MG/DL (ref 8.6–10.5)
CHLORIDE SERPL-SCNC: 102 MMOL/L (ref 98–107)
CHOLEST SERPL-MCNC: 192 MG/DL (ref 0–200)
CO2 SERPL-SCNC: 32 MMOL/L (ref 22–29)
CREAT SERPL-MCNC: 0.72 MG/DL (ref 0.57–1)
DEPRECATED RDW RBC AUTO: 44.9 FL (ref 37–54)
EOSINOPHIL # BLD AUTO: 0.16 10*3/MM3 (ref 0–0.4)
EOSINOPHIL NFR BLD AUTO: 1.7 % (ref 0.3–6.2)
ERYTHROCYTE [DISTWIDTH] IN BLOOD BY AUTOMATED COUNT: 12.6 % (ref 12.3–15.4)
GFR SERPL CREATININE-BSD FRML MDRD: 83 ML/MIN/1.73
GLOBULIN UR ELPH-MCNC: 2.7 GM/DL
GLUCOSE SERPL-MCNC: 107 MG/DL (ref 65–99)
HCT VFR BLD AUTO: 45 % (ref 34–46.6)
HDLC SERPL-MCNC: 63 MG/DL (ref 40–60)
HGB BLD-MCNC: 13.9 G/DL (ref 12–15.9)
IMM GRANULOCYTES # BLD AUTO: 0.03 10*3/MM3 (ref 0–0.05)
IMM GRANULOCYTES NFR BLD AUTO: 0.3 % (ref 0–0.5)
LDLC SERPL CALC-MCNC: 116 MG/DL (ref 0–100)
LDLC/HDLC SERPL: 1.82 {RATIO}
LYMPHOCYTES # BLD AUTO: 2.51 10*3/MM3 (ref 0.7–3.1)
LYMPHOCYTES NFR BLD AUTO: 27.1 % (ref 19.6–45.3)
MCH RBC QN AUTO: 29.8 PG (ref 26.6–33)
MCHC RBC AUTO-ENTMCNC: 30.9 G/DL (ref 31.5–35.7)
MCV RBC AUTO: 96.6 FL (ref 79–97)
MONOCYTES # BLD AUTO: 0.6 10*3/MM3 (ref 0.1–0.9)
MONOCYTES NFR BLD AUTO: 6.5 % (ref 5–12)
NEUTROPHILS NFR BLD AUTO: 5.9 10*3/MM3 (ref 1.7–7)
NEUTROPHILS NFR BLD AUTO: 63.9 % (ref 42.7–76)
NRBC BLD AUTO-RTO: 0 /100 WBC (ref 0–0.2)
PLATELET # BLD AUTO: 238 10*3/MM3 (ref 140–450)
PMV BLD AUTO: 11.6 FL (ref 6–12)
POTASSIUM SERPL-SCNC: 4.1 MMOL/L (ref 3.5–5.2)
PROT SERPL-MCNC: 6.9 G/DL (ref 6–8.5)
RBC # BLD AUTO: 4.66 10*6/MM3 (ref 3.77–5.28)
SODIUM SERPL-SCNC: 141 MMOL/L (ref 136–145)
T3FREE SERPL-MCNC: 2.87 PG/ML (ref 2–4.4)
T4 FREE SERPL-MCNC: 1.34 NG/DL (ref 0.93–1.7)
TRIGL SERPL-MCNC: 73 MG/DL (ref 0–150)
TSH SERPL DL<=0.05 MIU/L-ACNC: 0.41 UIU/ML (ref 0.27–4.2)
VLDLC SERPL-MCNC: 13 MG/DL (ref 5–40)
WBC # BLD AUTO: 9.25 10*3/MM3 (ref 3.4–10.8)

## 2021-09-01 PROCEDURE — 80061 LIPID PANEL: CPT | Performed by: FAMILY MEDICINE

## 2021-09-01 PROCEDURE — 80050 GENERAL HEALTH PANEL: CPT | Performed by: FAMILY MEDICINE

## 2021-09-01 PROCEDURE — 99213 OFFICE O/P EST LOW 20 MIN: CPT | Performed by: FAMILY MEDICINE

## 2021-09-01 PROCEDURE — 84481 FREE ASSAY (FT-3): CPT | Performed by: FAMILY MEDICINE

## 2021-09-01 PROCEDURE — 84439 ASSAY OF FREE THYROXINE: CPT | Performed by: FAMILY MEDICINE

## 2021-09-01 PROCEDURE — 36415 COLL VENOUS BLD VENIPUNCTURE: CPT | Performed by: FAMILY MEDICINE

## 2021-09-01 NOTE — PROGRESS NOTES
Subjective   Kirby Gunter is a 56 y.o. female.     Kirby Gunter is in for follow up on her high blood pressure and hypothyroidism and she is due for labs as well. There is no history of chest pain or dyspnea. There is no history of issue with bowel or bladder dysfunction. There is no history of dizziness or lightheadedness. There is no history of issue with sleep or mood. There is no history of issue with present medication.            /85 (BP Location: Left arm, Patient Position: Sitting, Cuff Size: Large Adult)   Pulse 79   Temp 98.4 °F (36.9 °C) (Temporal)   Wt 65.3 kg (144 lb)   SpO2 93%   BMI 26.34 kg/m²       Chief Complaint   Patient presents with   • Hypertension     6 month f/u - fasting labs            Current Outpatient Medications:   •  acyclovir (Zovirax) 5 % ointment, Apply  topically to the appropriate area as directed 4 (Four) Times a Day., Disp: 15 g, Rfl: 1  •  levothyroxine (SYNTHROID, LEVOTHROID) 88 MCG tablet, Take 1 tablet by mouth Every Morning Before Breakfast. Take on empty stomach!, Disp: 90 tablet, Rfl: 1  •  lisinopril-hydrochlorothiazide (PRINZIDE,ZESTORETIC) 20-25 MG per tablet, TAKE ONE TABLET BY MOUTH DAILY, Disp: 30 tablet, Rfl: 3        The following portions of the patient's history were reviewed and updated as appropriate: allergies, current medications, past family history, past medical history, past social history, past surgical history, and problem list.    Review of Systems   Constitutional: Negative for activity change, fatigue and fever.   HENT: Negative for congestion, sinus pressure, sinus pain, sore throat and trouble swallowing.    Eyes: Negative for visual disturbance.   Respiratory: Negative for chest tightness, shortness of breath and wheezing.    Cardiovascular: Negative for chest pain.   Gastrointestinal: Negative for abdominal distention, abdominal pain, constipation, diarrhea, nausea and vomiting.   Genitourinary: Negative for difficulty urinating  and dysuria.   Musculoskeletal: Negative for back pain and neck pain.   Skin: Negative for rash.   Psychiatric/Behavioral: Negative for agitation, hallucinations and suicidal ideas.       Objective   Physical Exam  Vitals and nursing note reviewed.   Constitutional:       Appearance: Normal appearance.   Cardiovascular:      Rate and Rhythm: Normal rate and regular rhythm.      Heart sounds: Normal heart sounds. No murmur heard.     Pulmonary:      Effort: Pulmonary effort is normal.      Breath sounds: No wheezing or rales.   Abdominal:      General: Bowel sounds are normal.      Palpations: Abdomen is soft.      Tenderness: There is no abdominal tenderness. There is no guarding.   Musculoskeletal:      Cervical back: Neck supple.      Right lower leg: No edema.      Left lower leg: No edema.   Lymphadenopathy:      Cervical: No cervical adenopathy.   Skin:     Findings: No bruising or rash.   Neurological:      General: No focal deficit present.      Mental Status: She is alert and oriented to person, place, and time.   Psychiatric:         Mood and Affect: Mood normal.           Assessment/Plan   Problems Addressed this Visit        Cardiac and Vasculature    Essential hypertension - Primary    Relevant Orders    Comprehensive metabolic panel    Lipid panel       Endocrine and Metabolic    Hypothyroidism    Relevant Orders    CBC w AUTO Differential    T3, free    T4, free    TSH      Diagnoses       Codes Comments    Essential hypertension    -  Primary ICD-10-CM: I10  ICD-9-CM: 401.9     Acquired hypothyroidism     ICD-10-CM: E03.9  ICD-9-CM: 244.9           I will update labs and adjust treatment plan as indicated  I have asked her to remain active  I have asked her to get her Covid booster when due this fall  She is to call for new concerns in the interim

## 2021-10-13 DIAGNOSIS — E03.9 ACQUIRED HYPOTHYROIDISM: ICD-10-CM

## 2021-10-13 RX ORDER — LEVOTHYROXINE SODIUM 88 UG/1
TABLET ORAL
Qty: 30 TABLET | Refills: 3 | Status: SHIPPED | OUTPATIENT
Start: 2021-10-13 | End: 2022-02-16 | Stop reason: SDUPTHER

## 2021-10-13 RX ORDER — LISINOPRIL AND HYDROCHLOROTHIAZIDE 25; 20 MG/1; MG/1
TABLET ORAL
Qty: 30 TABLET | Refills: 3 | Status: SHIPPED | OUTPATIENT
Start: 2021-10-13 | End: 2022-02-09

## 2022-02-09 RX ORDER — LISINOPRIL AND HYDROCHLOROTHIAZIDE 25; 20 MG/1; MG/1
TABLET ORAL
Qty: 30 TABLET | Refills: 3 | Status: SHIPPED | OUTPATIENT
Start: 2022-02-09 | End: 2022-06-13

## 2022-02-16 DIAGNOSIS — E03.9 ACQUIRED HYPOTHYROIDISM: ICD-10-CM

## 2022-02-16 RX ORDER — LEVOTHYROXINE SODIUM 88 UG/1
88 TABLET ORAL DAILY
Qty: 30 TABLET | Refills: 3 | Status: SHIPPED | OUTPATIENT
Start: 2022-02-16 | End: 2022-06-13

## 2022-02-16 NOTE — TELEPHONE ENCOUNTER
Caller: [facility] - [place], [place] - [address] - [phone] [place] [phone] FX    Relationship: Pharmacy    Best call back number: [phone]    Requested Prescriptions:   Requested Prescriptions     Pending Prescriptions Disp Refills   • levothyroxine (SYNTHROID, LEVOTHROID) 88 MCG tablet 30 tablet 3        Pharmacy where request should be sent: [facility] - [place], [place] - [address] - [phone] [place] [phone] FX     Does the patient have less than a 3 day supply:  [x] Yes  [] No    Jaqui Lugo Rep   02/16/22 13:43 EST

## 2022-03-03 ENCOUNTER — OFFICE VISIT (OUTPATIENT)
Dept: FAMILY MEDICINE CLINIC | Facility: CLINIC | Age: 57
End: 2022-03-03

## 2022-03-03 ENCOUNTER — LAB (OUTPATIENT)
Dept: FAMILY MEDICINE CLINIC | Facility: CLINIC | Age: 57
End: 2022-03-03

## 2022-03-03 VITALS
TEMPERATURE: 98.4 F | HEART RATE: 88 BPM | DIASTOLIC BLOOD PRESSURE: 74 MMHG | OXYGEN SATURATION: 94 % | BODY MASS INDEX: 25.61 KG/M2 | SYSTOLIC BLOOD PRESSURE: 121 MMHG | WEIGHT: 140 LBS

## 2022-03-03 DIAGNOSIS — I10 ESSENTIAL HYPERTENSION: Primary | ICD-10-CM

## 2022-03-03 DIAGNOSIS — E03.9 HYPOTHYROIDISM, UNSPECIFIED TYPE: ICD-10-CM

## 2022-03-03 DIAGNOSIS — Z29.9 PREVENTIVE MEASURE: ICD-10-CM

## 2022-03-03 LAB
ALBUMIN SERPL-MCNC: 4.5 G/DL (ref 3.5–5.2)
ALBUMIN/GLOB SERPL: 1.8 G/DL
ALP SERPL-CCNC: 77 U/L (ref 39–117)
ALT SERPL W P-5'-P-CCNC: 15 U/L (ref 1–33)
ANION GAP SERPL CALCULATED.3IONS-SCNC: 8.4 MMOL/L (ref 5–15)
AST SERPL-CCNC: 14 U/L (ref 1–32)
BASOPHILS # BLD AUTO: 0.05 10*3/MM3 (ref 0–0.2)
BASOPHILS NFR BLD AUTO: 0.4 % (ref 0–1.5)
BILIRUB SERPL-MCNC: 0.4 MG/DL (ref 0–1.2)
BUN SERPL-MCNC: 9 MG/DL (ref 6–20)
BUN/CREAT SERPL: 13.2 (ref 7–25)
CALCIUM SPEC-SCNC: 9.6 MG/DL (ref 8.6–10.5)
CHLORIDE SERPL-SCNC: 102 MMOL/L (ref 98–107)
CHOLEST SERPL-MCNC: 195 MG/DL (ref 0–200)
CO2 SERPL-SCNC: 31.6 MMOL/L (ref 22–29)
CREAT SERPL-MCNC: 0.68 MG/DL (ref 0.57–1)
DEPRECATED RDW RBC AUTO: 40.7 FL (ref 37–54)
EGFRCR SERPLBLD CKD-EPI 2021: 101.1 ML/MIN/1.73
EOSINOPHIL # BLD AUTO: 0.1 10*3/MM3 (ref 0–0.4)
EOSINOPHIL NFR BLD AUTO: 0.8 % (ref 0.3–6.2)
ERYTHROCYTE [DISTWIDTH] IN BLOOD BY AUTOMATED COUNT: 12.1 % (ref 12.3–15.4)
GLOBULIN UR ELPH-MCNC: 2.5 GM/DL
GLUCOSE SERPL-MCNC: 94 MG/DL (ref 65–99)
HCT VFR BLD AUTO: 43 % (ref 34–46.6)
HCV AB SER DONR QL: NORMAL
HDLC SERPL-MCNC: 62 MG/DL (ref 40–60)
HGB BLD-MCNC: 14.1 G/DL (ref 12–15.9)
IMM GRANULOCYTES # BLD AUTO: 0.04 10*3/MM3 (ref 0–0.05)
IMM GRANULOCYTES NFR BLD AUTO: 0.3 % (ref 0–0.5)
LDLC SERPL CALC-MCNC: 116 MG/DL (ref 0–100)
LDLC/HDLC SERPL: 1.84 {RATIO}
LYMPHOCYTES # BLD AUTO: 2.5 10*3/MM3 (ref 0.7–3.1)
LYMPHOCYTES NFR BLD AUTO: 21 % (ref 19.6–45.3)
MCH RBC QN AUTO: 30.4 PG (ref 26.6–33)
MCHC RBC AUTO-ENTMCNC: 32.8 G/DL (ref 31.5–35.7)
MCV RBC AUTO: 92.7 FL (ref 79–97)
MONOCYTES # BLD AUTO: 0.77 10*3/MM3 (ref 0.1–0.9)
MONOCYTES NFR BLD AUTO: 6.5 % (ref 5–12)
NEUTROPHILS NFR BLD AUTO: 71 % (ref 42.7–76)
NEUTROPHILS NFR BLD AUTO: 8.47 10*3/MM3 (ref 1.7–7)
NRBC BLD AUTO-RTO: 0 /100 WBC (ref 0–0.2)
PLATELET # BLD AUTO: 272 10*3/MM3 (ref 140–450)
PMV BLD AUTO: 11.5 FL (ref 6–12)
POTASSIUM SERPL-SCNC: 3.8 MMOL/L (ref 3.5–5.2)
PROT SERPL-MCNC: 7 G/DL (ref 6–8.5)
RBC # BLD AUTO: 4.64 10*6/MM3 (ref 3.77–5.28)
SODIUM SERPL-SCNC: 142 MMOL/L (ref 136–145)
T3FREE SERPL-MCNC: 3.09 PG/ML (ref 2–4.4)
T4 FREE SERPL-MCNC: 1.63 NG/DL (ref 0.93–1.7)
TRIGL SERPL-MCNC: 94 MG/DL (ref 0–150)
TSH SERPL DL<=0.05 MIU/L-ACNC: 0.51 UIU/ML (ref 0.27–4.2)
VLDLC SERPL-MCNC: 17 MG/DL (ref 5–40)
WBC NRBC COR # BLD: 11.93 10*3/MM3 (ref 3.4–10.8)

## 2022-03-03 PROCEDURE — 99213 OFFICE O/P EST LOW 20 MIN: CPT | Performed by: FAMILY MEDICINE

## 2022-03-03 PROCEDURE — 36415 COLL VENOUS BLD VENIPUNCTURE: CPT | Performed by: FAMILY MEDICINE

## 2022-03-03 PROCEDURE — 84481 FREE ASSAY (FT-3): CPT | Performed by: FAMILY MEDICINE

## 2022-03-03 PROCEDURE — 86803 HEPATITIS C AB TEST: CPT | Performed by: FAMILY MEDICINE

## 2022-03-03 PROCEDURE — 84439 ASSAY OF FREE THYROXINE: CPT | Performed by: FAMILY MEDICINE

## 2022-03-03 PROCEDURE — 80061 LIPID PANEL: CPT | Performed by: FAMILY MEDICINE

## 2022-03-03 PROCEDURE — 80050 GENERAL HEALTH PANEL: CPT | Performed by: FAMILY MEDICINE

## 2022-03-03 NOTE — PROGRESS NOTES
Subjective   Kirby Gunter is a 56 y.o. female.     Kirby Gunter is in for follow up on her high blood pressure and hypothyroidism. She has been fighting some head congestion and right ear pain the past several days. She had a sore throat but that has resolved. OTC medication has helped her symptoms somewhat. There is no history of chest pain or dyspnea. There is no history of issue with bowel or bladder dysfunction. There is no history of dizziness or lightheadedness. There is no history of issue with sleep or mood. There is no history of issue with present medication.            /74 (BP Location: Left arm, Patient Position: Sitting, Cuff Size: Large Adult)   Pulse 88   Temp 98.4 °F (36.9 °C) (Temporal)   Wt 63.5 kg (140 lb)   SpO2 94%   BMI 25.61 kg/m²       Chief Complaint   Patient presents with   • Hypertension     6 month f/u - fasting for labs            Current Outpatient Medications:   •  acyclovir (Zovirax) 5 % ointment, Apply  topically to the appropriate area as directed 4 (Four) Times a Day., Disp: 15 g, Rfl: 1  •  levothyroxine (SYNTHROID, LEVOTHROID) 88 MCG tablet, Take 1 tablet by mouth Daily., Disp: 30 tablet, Rfl: 3  •  lisinopril-hydrochlorothiazide (PRINZIDE,ZESTORETIC) 20-25 MG per tablet, TAKE ONE TABLET BY MOUTH DAILY, Disp: 30 tablet, Rfl: 3        The following portions of the patient's history were reviewed and updated as appropriate: allergies, current medications, past family history, past medical history, past social history, past surgical history, and problem list.    Review of Systems   Constitutional: Negative for activity change, fatigue and fever.   HENT: Positive for congestion and postnasal drip. Negative for sinus pressure, sinus pain, sore throat and trouble swallowing.    Eyes: Negative for visual disturbance.   Respiratory: Positive for cough. Negative for chest tightness, shortness of breath and wheezing.    Cardiovascular: Negative for chest pain.    Gastrointestinal: Negative for abdominal distention, abdominal pain, constipation, diarrhea, nausea and vomiting.   Genitourinary: Negative for difficulty urinating and dysuria.   Musculoskeletal: Negative for back pain and neck pain.   Skin: Negative for rash.   Neurological: Negative for dizziness and weakness.   Psychiatric/Behavioral: Negative for agitation, hallucinations, sleep disturbance and suicidal ideas. The patient is not nervous/anxious.        Objective   Physical Exam  Vitals and nursing note reviewed.   Constitutional:       Appearance: Normal appearance.   HENT:      Right Ear: Tympanic membrane and ear canal normal.      Left Ear: Tympanic membrane and ear canal normal.      Nose: Congestion and rhinorrhea present.      Mouth/Throat:      Pharynx: Oropharynx is clear. No oropharyngeal exudate or posterior oropharyngeal erythema.   Cardiovascular:      Rate and Rhythm: Normal rate and regular rhythm.      Heart sounds: Normal heart sounds.   Pulmonary:      Effort: Pulmonary effort is normal.      Breath sounds: No wheezing or rales.   Abdominal:      General: Bowel sounds are normal.      Palpations: Abdomen is soft.      Tenderness: There is no abdominal tenderness. There is no guarding.   Musculoskeletal:      Cervical back: Neck supple.      Right lower leg: No edema.      Left lower leg: No edema.   Lymphadenopathy:      Cervical: No cervical adenopathy.   Neurological:      General: No focal deficit present.      Mental Status: She is alert and oriented to person, place, and time.   Psychiatric:         Mood and Affect: Mood normal.           Assessment/Plan   Problems Addressed this Visit        Cardiac and Vasculature    Essential hypertension - Primary    Relevant Orders    Comprehensive metabolic panel    Lipid panel       Endocrine and Metabolic    Hypothyroidism    Relevant Orders    T3, free    T4, free    TSH    CBC w AUTO Differential      Other Visit Diagnoses     Preventive  measure        Relevant Orders    Cologuard - Stool, Per Rectum    Hepatitis C antibody      Diagnoses       Codes Comments    Essential hypertension    -  Primary ICD-10-CM: I10  ICD-9-CM: 401.9     Hypothyroidism, unspecified type     ICD-10-CM: E03.9  ICD-9-CM: 244.9     Preventive measure     ICD-10-CM: Z29.9  ICD-9-CM: V07.9         I will update her labs and adjust treatment as indicated  I have asked her to keep her COVID boosted  I have asked her to get a Cologuard done  She will see me again in 6 months, sooner if she has new concerns

## 2022-03-10 ENCOUNTER — TELEPHONE (OUTPATIENT)
Dept: FAMILY MEDICINE CLINIC | Facility: CLINIC | Age: 57
End: 2022-03-10

## 2022-03-10 RX ORDER — AMOXICILLIN AND CLAVULANATE POTASSIUM 875; 125 MG/1; MG/1
1 TABLET, FILM COATED ORAL 2 TIMES DAILY
Qty: 20 TABLET | Refills: 0 | Status: SHIPPED | OUTPATIENT
Start: 2022-03-10 | End: 2022-03-20

## 2022-03-10 RX ORDER — CHLORCYCLIZINE HYDROCHLORIDE AND PSEUDOEPHEDRINE HYDROCHLORIDE 25; 60 MG/1; MG/1
0.5 TABLET ORAL 2 TIMES DAILY PRN
Qty: 30 TABLET | Refills: 0 | Status: SHIPPED | OUTPATIENT
Start: 2022-03-10

## 2022-03-10 NOTE — TELEPHONE ENCOUNTER
PATIENT IS CALLING IN SHE WAS HERE TO SEE DOCTOR LAST WEEK AND HE WAS GOING TO SEND ANTIBIOTIC FOR SINUS INFECTION AND PHARMACY HAS NOT RECEIVED THIS.    PLEASE ADVISE     CALLBACK NUMBER IS  [phone]      CONFIRMED PHARMACY  [facility] [address] [place] [address] - [phone] [facility] [phone] FX

## 2022-03-11 NOTE — TELEPHONE ENCOUNTER
Prior to immunization administration, verified patients identity using patient s name and date of birth. Please see Immunization Activity for additional information.     Screening Questionnaire for Adult Immunization    Are you sick today?   No   Do you have allergies to medications, food, a vaccine component or latex?   No   Have you ever had a serious reaction after receiving a vaccination?   No   Do you have a long-term health problem with heart, lung, kidney, or metabolic disease (e.g., diabetes), asthma, a blood disorder, no spleen, complement component deficiency, a cochlear implant, or a spinal fluid leak?  Are you on long-term aspirin therapy?   No   Do you have cancer, leukemia, HIV/AIDS, or any other immune system problem?   No   Do you have a parent, brother, or sister with an immune system problem?   No   In the past 3 months, have you taken medications that affect  your immune system, such as prednisone, other steroids, or anticancer drugs; drugs for the treatment of rheumatoid arthritis, Crohn s disease, or psoriasis; or have you had radiation treatments?   No   Have you had a seizure, or a brain or other nervous system problem?   No   During the past year, have you received a transfusion of blood or blood    products, or been given immune (gamma) globulin or antiviral drug?   No   For women: Are you pregnant or is there a chance you could become       pregnant during the next month?   No   Have you received any vaccinations in the past 4 weeks?   No     Immunization questionnaire answers were all negative.        Per orders of Dr. Lamar, injection of shingrix  given by Tere Hurley. Patient instructed to remain in clinic for 15 minutes afterwards, and to report any adverse reaction to me immediately.       Screening performed by Tere Hurley on 3/11/2022 at 10:40 AM.   Pt dropped off form for [employer] today.

## 2022-06-11 DIAGNOSIS — E03.9 ACQUIRED HYPOTHYROIDISM: ICD-10-CM

## 2022-06-13 RX ORDER — LISINOPRIL AND HYDROCHLOROTHIAZIDE 25; 20 MG/1; MG/1
TABLET ORAL
Qty: 30 TABLET | Refills: 3 | Status: SHIPPED | OUTPATIENT
Start: 2022-06-13 | End: 2022-10-03

## 2022-06-13 RX ORDER — LEVOTHYROXINE SODIUM 88 UG/1
TABLET ORAL
Qty: 30 TABLET | Refills: 3 | Status: SHIPPED | OUTPATIENT
Start: 2022-06-13 | End: 2022-10-03

## 2022-06-17 RX ORDER — CHLORCYCLIZINE HYDROCHLORIDE AND PSEUDOEPHEDRINE HYDROCHLORIDE 25; 60 MG/1; MG/1
TABLET ORAL
Qty: 30 TABLET | Refills: 0 | OUTPATIENT
Start: 2022-06-17

## 2022-06-17 RX ORDER — AMOXICILLIN AND CLAVULANATE POTASSIUM 875; 125 MG/1; MG/1
TABLET, FILM COATED ORAL
Qty: 20 TABLET | Refills: 0 | OUTPATIENT
Start: 2022-06-17

## 2022-09-07 ENCOUNTER — OFFICE VISIT (OUTPATIENT)
Dept: FAMILY MEDICINE CLINIC | Facility: CLINIC | Age: 57
End: 2022-09-07

## 2022-09-07 ENCOUNTER — LAB (OUTPATIENT)
Dept: FAMILY MEDICINE CLINIC | Facility: CLINIC | Age: 57
End: 2022-09-07

## 2022-09-07 VITALS
HEART RATE: 89 BPM | SYSTOLIC BLOOD PRESSURE: 133 MMHG | TEMPERATURE: 98.4 F | OXYGEN SATURATION: 91 % | BODY MASS INDEX: 26.19 KG/M2 | DIASTOLIC BLOOD PRESSURE: 78 MMHG | WEIGHT: 143.2 LBS

## 2022-09-07 DIAGNOSIS — E03.9 HYPOTHYROIDISM, UNSPECIFIED TYPE: ICD-10-CM

## 2022-09-07 DIAGNOSIS — I10 ESSENTIAL HYPERTENSION: Primary | ICD-10-CM

## 2022-09-07 LAB
ALBUMIN SERPL-MCNC: 4.3 G/DL (ref 3.5–5.2)
ALBUMIN/GLOB SERPL: 1.7 G/DL
ALP SERPL-CCNC: 59 U/L (ref 39–117)
ALT SERPL W P-5'-P-CCNC: 14 U/L (ref 1–33)
ANION GAP SERPL CALCULATED.3IONS-SCNC: 8.1 MMOL/L (ref 5–15)
AST SERPL-CCNC: 19 U/L (ref 1–32)
BASOPHILS # BLD AUTO: 0.05 10*3/MM3 (ref 0–0.2)
BASOPHILS NFR BLD AUTO: 0.6 % (ref 0–1.5)
BILIRUB SERPL-MCNC: 0.4 MG/DL (ref 0–1.2)
BUN SERPL-MCNC: 17 MG/DL (ref 6–20)
BUN/CREAT SERPL: 23.9 (ref 7–25)
CALCIUM SPEC-SCNC: 9.9 MG/DL (ref 8.6–10.5)
CHLORIDE SERPL-SCNC: 101 MMOL/L (ref 98–107)
CHOLEST SERPL-MCNC: 198 MG/DL (ref 0–200)
CO2 SERPL-SCNC: 32.9 MMOL/L (ref 22–29)
CREAT SERPL-MCNC: 0.71 MG/DL (ref 0.57–1)
DEPRECATED RDW RBC AUTO: 40.2 FL (ref 37–54)
EGFRCR SERPLBLD CKD-EPI 2021: 98.1 ML/MIN/1.73
EOSINOPHIL # BLD AUTO: 0.08 10*3/MM3 (ref 0–0.4)
EOSINOPHIL NFR BLD AUTO: 0.9 % (ref 0.3–6.2)
ERYTHROCYTE [DISTWIDTH] IN BLOOD BY AUTOMATED COUNT: 12.2 % (ref 12.3–15.4)
GLOBULIN UR ELPH-MCNC: 2.6 GM/DL
GLUCOSE SERPL-MCNC: 111 MG/DL (ref 65–99)
HCT VFR BLD AUTO: 41.4 % (ref 34–46.6)
HDLC SERPL-MCNC: 62 MG/DL (ref 40–60)
HGB BLD-MCNC: 13.6 G/DL (ref 12–15.9)
IMM GRANULOCYTES # BLD AUTO: 0.03 10*3/MM3 (ref 0–0.05)
IMM GRANULOCYTES NFR BLD AUTO: 0.3 % (ref 0–0.5)
LDLC SERPL CALC-MCNC: 120 MG/DL (ref 0–100)
LDLC/HDLC SERPL: 1.91 {RATIO}
LYMPHOCYTES # BLD AUTO: 2.34 10*3/MM3 (ref 0.7–3.1)
LYMPHOCYTES NFR BLD AUTO: 26.9 % (ref 19.6–45.3)
MCH RBC QN AUTO: 30 PG (ref 26.6–33)
MCHC RBC AUTO-ENTMCNC: 32.9 G/DL (ref 31.5–35.7)
MCV RBC AUTO: 91.2 FL (ref 79–97)
MONOCYTES # BLD AUTO: 0.63 10*3/MM3 (ref 0.1–0.9)
MONOCYTES NFR BLD AUTO: 7.2 % (ref 5–12)
NEUTROPHILS NFR BLD AUTO: 5.57 10*3/MM3 (ref 1.7–7)
NEUTROPHILS NFR BLD AUTO: 64.1 % (ref 42.7–76)
NRBC BLD AUTO-RTO: 0 /100 WBC (ref 0–0.2)
PLATELET # BLD AUTO: 201 10*3/MM3 (ref 140–450)
PMV BLD AUTO: 11.9 FL (ref 6–12)
POTASSIUM SERPL-SCNC: 4.2 MMOL/L (ref 3.5–5.2)
PROT SERPL-MCNC: 6.9 G/DL (ref 6–8.5)
RBC # BLD AUTO: 4.54 10*6/MM3 (ref 3.77–5.28)
SODIUM SERPL-SCNC: 142 MMOL/L (ref 136–145)
T3FREE SERPL-MCNC: 2.87 PG/ML (ref 2–4.4)
T4 FREE SERPL-MCNC: 1.68 NG/DL (ref 0.93–1.7)
TRIGL SERPL-MCNC: 87 MG/DL (ref 0–150)
TSH SERPL DL<=0.05 MIU/L-ACNC: 0.21 UIU/ML (ref 0.27–4.2)
VLDLC SERPL-MCNC: 16 MG/DL (ref 5–40)
WBC NRBC COR # BLD: 8.7 10*3/MM3 (ref 3.4–10.8)

## 2022-09-07 PROCEDURE — 84439 ASSAY OF FREE THYROXINE: CPT | Performed by: FAMILY MEDICINE

## 2022-09-07 PROCEDURE — 80061 LIPID PANEL: CPT | Performed by: FAMILY MEDICINE

## 2022-09-07 PROCEDURE — 80050 GENERAL HEALTH PANEL: CPT | Performed by: FAMILY MEDICINE

## 2022-09-07 PROCEDURE — 99213 OFFICE O/P EST LOW 20 MIN: CPT | Performed by: FAMILY MEDICINE

## 2022-09-07 PROCEDURE — 36415 COLL VENOUS BLD VENIPUNCTURE: CPT | Performed by: FAMILY MEDICINE

## 2022-09-07 PROCEDURE — 84481 FREE ASSAY (FT-3): CPT | Performed by: FAMILY MEDICINE

## 2022-09-07 NOTE — PROGRESS NOTES
Subjective   Kirby Gunter is a 57 y.o. female.     Kirby Gunter is in for follow up on his high blood pressure and hypothyroidism. There is no history of chest pain or dyspnea. There is no history of issue with bowel or bladder dysfunction. There is no history of dizziness or lightheadedness. There is no history of issue with sleep or mood. There is no history of issue with present medication.            /78 (BP Location: Left arm, Patient Position: Sitting, Cuff Size: Large Adult)   Pulse 89   Temp 98.4 °F (36.9 °C) (Temporal)   Wt 65 kg (143 lb 3.2 oz)   SpO2 91%   BMI 26.19 kg/m²       Chief Complaint   Patient presents with   • Hypertension     6 month f/u            Current Outpatient Medications:   •  acyclovir (Zovirax) 5 % ointment, Apply  topically to the appropriate area as directed 4 (Four) Times a Day., Disp: 15 g, Rfl: 1  •  Chlorcyclizine-Pseudoephed (Stahist AD) 25-60 MG tablet, Take 0.5 tablets by mouth 2 (Two) Times a Day As Needed (sinus pressure, congestion)., Disp: 30 tablet, Rfl: 0  •  levothyroxine (SYNTHROID, LEVOTHROID) 88 MCG tablet, TAKE ONE TABLET BY MOUTH DAILY, Disp: 30 tablet, Rfl: 3  •  lisinopril-hydrochlorothiazide (PRINZIDE,ZESTORETIC) 20-25 MG per tablet, TAKE ONE TABLET BY MOUTH DAILY, Disp: 30 tablet, Rfl: 3        The following portions of the patient's history were reviewed and updated as appropriate: allergies, current medications, past family history, past medical history, past social history, past surgical history, and problem list.    Review of Systems   Constitutional: Negative for activity change, fatigue and fever.   HENT: Negative for congestion, sinus pressure, sinus pain, sore throat and trouble swallowing.    Eyes: Negative for visual disturbance.   Respiratory: Negative for chest tightness, shortness of breath and wheezing.    Cardiovascular: Negative for chest pain.   Gastrointestinal: Negative for abdominal distention, abdominal pain, constipation,  diarrhea, nausea and vomiting.   Genitourinary: Negative for difficulty urinating and dysuria.   Musculoskeletal: Negative for back pain and neck pain.   Skin: Negative for rash.   Psychiatric/Behavioral: Negative for agitation, hallucinations and suicidal ideas.       Objective   Physical Exam  Vitals and nursing note reviewed.   Constitutional:       Appearance: Normal appearance.   HENT:      Right Ear: Tympanic membrane and ear canal normal.      Left Ear: Tympanic membrane and ear canal normal.   Eyes:      Conjunctiva/sclera: Conjunctivae normal.      Pupils: Pupils are equal, round, and reactive to light.   Cardiovascular:      Rate and Rhythm: Normal rate and regular rhythm.      Heart sounds: Normal heart sounds. No murmur heard.  Pulmonary:      Effort: Pulmonary effort is normal.      Breath sounds: No wheezing or rales.   Abdominal:      General: Bowel sounds are normal.      Palpations: Abdomen is soft.      Tenderness: There is no abdominal tenderness. There is no guarding.   Musculoskeletal:      Cervical back: Neck supple.      Right lower leg: No edema.      Left lower leg: No edema.   Lymphadenopathy:      Cervical: No cervical adenopathy.   Neurological:      General: No focal deficit present.      Mental Status: She is alert and oriented to person, place, and time.   Psychiatric:         Mood and Affect: Mood normal.           Assessment & Plan   Problems Addressed this Visit        Cardiac and Vasculature    Essential hypertension - Primary    Relevant Orders    Comprehensive metabolic panel    Lipid panel       Endocrine and Metabolic    Hypothyroidism    Relevant Orders    T3, free    T4, free    TSH    CBC w AUTO Differential      Diagnoses       Codes Comments    Essential hypertension    -  Primary ICD-10-CM: I10  ICD-9-CM: 401.9     Hypothyroidism, unspecified type     ICD-10-CM: E03.9  ICD-9-CM: 244.9         I will update labs and adjust the treatment plan if indicated  I have asked her  to get her updated COVID-vaccine soon and to get her flu vaccine next month  I have asked her to call with any new concerns  I have asked her to remain active and see me again in 6 months

## 2022-10-03 DIAGNOSIS — E03.9 ACQUIRED HYPOTHYROIDISM: ICD-10-CM

## 2022-10-03 RX ORDER — LEVOTHYROXINE SODIUM 88 UG/1
TABLET ORAL
Qty: 30 TABLET | Refills: 3 | Status: SHIPPED | OUTPATIENT
Start: 2022-10-03 | End: 2023-02-02

## 2022-10-03 RX ORDER — LISINOPRIL AND HYDROCHLOROTHIAZIDE 25; 20 MG/1; MG/1
TABLET ORAL
Qty: 30 TABLET | Refills: 3 | Status: SHIPPED | OUTPATIENT
Start: 2022-10-03 | End: 2023-02-02

## 2023-02-02 DIAGNOSIS — E03.9 ACQUIRED HYPOTHYROIDISM: ICD-10-CM

## 2023-02-02 RX ORDER — LEVOTHYROXINE SODIUM 88 UG/1
TABLET ORAL
Qty: 30 TABLET | Refills: 3 | Status: SHIPPED | OUTPATIENT
Start: 2023-02-02

## 2023-02-02 RX ORDER — LISINOPRIL AND HYDROCHLOROTHIAZIDE 25; 20 MG/1; MG/1
TABLET ORAL
Qty: 30 TABLET | Refills: 3 | Status: SHIPPED | OUTPATIENT
Start: 2023-02-02

## 2023-03-07 ENCOUNTER — OFFICE VISIT (OUTPATIENT)
Dept: FAMILY MEDICINE CLINIC | Facility: CLINIC | Age: 58
End: 2023-03-07
Payer: MEDICAID

## 2023-03-07 ENCOUNTER — LAB (OUTPATIENT)
Dept: FAMILY MEDICINE CLINIC | Facility: CLINIC | Age: 58
End: 2023-03-07
Payer: MEDICAID

## 2023-03-07 VITALS
OXYGEN SATURATION: 95 % | TEMPERATURE: 98.5 F | BODY MASS INDEX: 26.59 KG/M2 | WEIGHT: 145.4 LBS | SYSTOLIC BLOOD PRESSURE: 152 MMHG | HEART RATE: 87 BPM | DIASTOLIC BLOOD PRESSURE: 88 MMHG

## 2023-03-07 DIAGNOSIS — I10 ESSENTIAL HYPERTENSION: Primary | ICD-10-CM

## 2023-03-07 DIAGNOSIS — J06.9 ACUTE URI: ICD-10-CM

## 2023-03-07 LAB
ALBUMIN SERPL-MCNC: 4.3 G/DL (ref 3.5–5.2)
ALBUMIN/GLOB SERPL: 1.7 G/DL
ALP SERPL-CCNC: 68 U/L (ref 39–117)
ALT SERPL W P-5'-P-CCNC: 16 U/L (ref 1–33)
ANION GAP SERPL CALCULATED.3IONS-SCNC: 7 MMOL/L (ref 5–15)
AST SERPL-CCNC: 18 U/L (ref 1–32)
BILIRUB SERPL-MCNC: 0.2 MG/DL (ref 0–1.2)
BUN SERPL-MCNC: 12 MG/DL (ref 6–20)
BUN/CREAT SERPL: 15.6 (ref 7–25)
CALCIUM SPEC-SCNC: 9.9 MG/DL (ref 8.6–10.5)
CHLORIDE SERPL-SCNC: 101 MMOL/L (ref 98–107)
CHOLEST SERPL-MCNC: 184 MG/DL (ref 0–200)
CO2 SERPL-SCNC: 34 MMOL/L (ref 22–29)
CREAT SERPL-MCNC: 0.77 MG/DL (ref 0.57–1)
EGFRCR SERPLBLD CKD-EPI 2021: 89 ML/MIN/1.73
GLOBULIN UR ELPH-MCNC: 2.6 GM/DL
GLUCOSE SERPL-MCNC: 106 MG/DL (ref 65–99)
HDLC SERPL-MCNC: 61 MG/DL (ref 40–60)
LDLC SERPL CALC-MCNC: 110 MG/DL (ref 0–100)
LDLC/HDLC SERPL: 1.8 {RATIO}
POTASSIUM SERPL-SCNC: 4.8 MMOL/L (ref 3.5–5.2)
PROT SERPL-MCNC: 6.9 G/DL (ref 6–8.5)
SODIUM SERPL-SCNC: 142 MMOL/L (ref 136–145)
TRIGL SERPL-MCNC: 67 MG/DL (ref 0–150)
VLDLC SERPL-MCNC: 13 MG/DL (ref 5–40)

## 2023-03-07 PROCEDURE — 80061 LIPID PANEL: CPT | Performed by: FAMILY MEDICINE

## 2023-03-07 PROCEDURE — 80053 COMPREHEN METABOLIC PANEL: CPT | Performed by: FAMILY MEDICINE

## 2023-03-07 PROCEDURE — 36415 COLL VENOUS BLD VENIPUNCTURE: CPT | Performed by: FAMILY MEDICINE

## 2023-03-07 PROCEDURE — 99213 OFFICE O/P EST LOW 20 MIN: CPT | Performed by: FAMILY MEDICINE

## 2023-03-07 RX ORDER — GUAIFENESIN 600 MG/1
1200 TABLET, EXTENDED RELEASE ORAL 2 TIMES DAILY
Qty: 40 TABLET | Refills: 0 | Status: SHIPPED | OUTPATIENT
Start: 2023-03-07

## 2023-03-07 RX ORDER — AMLODIPINE BESYLATE 5 MG/1
5 TABLET ORAL DAILY
Qty: 90 TABLET | Refills: 1 | Status: SHIPPED | OUTPATIENT
Start: 2023-03-07

## 2023-03-07 NOTE — PROGRESS NOTES
Subjective   Kirby Gunter is a 57 y.o. female.     History of Present Illness  Kirby Gunter is in for follow up on her high blood pressure and it is not as controlled as previous. She has had an increase in the stress in her life and her body is not handling it well. There is no history of chest pain or dyspnea. There is no history of issue with bowel or bladder dysfunction. There is no history of dizziness or lightheadedness. She is not sleeping well due to caring for her boyfriend, who is having some health problems. She is currently dealing with some head congestion of note. She has not had any fever. She is still a smoker, about 1/2 pack per day. There is no history of issue with present medication.     Hypertension  Associated symptoms include headaches. Pertinent negatives include no chest pain, neck pain or shortness of breath.          /88 (BP Location: Left arm, Patient Position: Sitting, Cuff Size: Large Adult)   Pulse 87   Temp 98.5 °F (36.9 °C) (Temporal)   Wt 66 kg (145 lb 6.4 oz)   SpO2 95%   BMI 26.59 kg/m²       Chief Complaint   Patient presents with   • Hypertension     6 month f/u - fasting for labs    • drainage down her throat     Has been taking mucinex but its not helping also added the allergy sinus congestion            Current Outpatient Medications:   •  levothyroxine (SYNTHROID, LEVOTHROID) 88 MCG tablet, TAKE ONE TABLET BY MOUTH DAILY, Disp: 30 tablet, Rfl: 3  •  lisinopril-hydrochlorothiazide (PRINZIDE,ZESTORETIC) 20-25 MG per tablet, TAKE ONE TABLET BY MOUTH DAILY, Disp: 30 tablet, Rfl: 3  •  acyclovir (Zovirax) 5 % ointment, Apply  topically to the appropriate area as directed 4 (Four) Times a Day., Disp: 15 g, Rfl: 1  •  amLODIPine (NORVASC) 5 MG tablet, Take 1 tablet by mouth Daily., Disp: 90 tablet, Rfl: 1  •  Chlorcyclizine-Pseudoephed (Stahist AD) 25-60 MG tablet, Take 0.5 tablets by mouth 2 (Two) Times a Day As Needed (sinus pressure, congestion)., Disp: 30  tablet, Rfl: 0        The following portions of the patient's history were reviewed and updated as appropriate: allergies, current medications, past family history, past medical history, past social history, past surgical history, and problem list.    Review of Systems   Constitutional: Negative for activity change, fatigue and fever.   HENT: Positive for congestion, ear pain and postnasal drip. Negative for sinus pressure, sinus pain, sore throat and trouble swallowing.    Eyes: Negative for visual disturbance.   Respiratory: Negative for chest tightness, shortness of breath and wheezing.    Cardiovascular: Negative for chest pain.   Gastrointestinal: Negative for abdominal distention, abdominal pain, constipation, diarrhea, nausea and vomiting.   Genitourinary: Negative for difficulty urinating and dysuria.   Musculoskeletal: Negative for back pain and neck pain.   Skin: Negative for rash.   Neurological: Positive for headaches.   Psychiatric/Behavioral: Negative for agitation, hallucinations and suicidal ideas.       Objective   Physical Exam  Vitals and nursing note reviewed.   HENT:      Right Ear: Tympanic membrane and ear canal normal.      Left Ear: Tympanic membrane and ear canal normal.      Nose: Rhinorrhea present.      Mouth/Throat:      Pharynx: Oropharynx is clear. Posterior oropharyngeal erythema present.   Cardiovascular:      Rate and Rhythm: Normal rate and regular rhythm.      Heart sounds: Normal heart sounds. No murmur heard.  Pulmonary:      Effort: Pulmonary effort is normal.      Breath sounds: No wheezing or rales.   Abdominal:      General: Bowel sounds are normal.      Palpations: Abdomen is soft.      Tenderness: There is no abdominal tenderness. There is no guarding.   Musculoskeletal:      Cervical back: Neck supple.      Right lower leg: No edema.      Left lower leg: No edema.   Lymphadenopathy:      Cervical: No cervical adenopathy.   Neurological:      General: No focal deficit  present.      Mental Status: She is alert and oriented to person, place, and time.   Psychiatric:         Mood and Affect: Mood normal.       Kirby Gunter  reports that she has been smoking cigarettes. She started smoking about 41 years ago. She has been smoking an average of .5 packs per day. She has never used smokeless tobacco.. I have educated her on the risk of diseases from using tobacco products such as cancer, COPD and heart disease.     I advised her to quit and she is not willing to quit.    I spent 5 minutes counseling the patient.           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

## 2023-05-24 ENCOUNTER — NURSE TRIAGE (OUTPATIENT)
Dept: CALL CENTER | Facility: HOSPITAL | Age: 58
End: 2023-05-24
Payer: MEDICAID

## 2023-05-24 NOTE — TELEPHONE ENCOUNTER
HUB--pt transferred to me through the HUB via Jackie because pt was trying to make an appt sooner with her MD but she mentioned chest pain and pneumonia to Jackie at the HUB so she transferred her to me.  Pt states she does not have active chest pain at this time.  She does at times but thinks it is from the pneumonia she can't get over.  She last went to an urgent care last Sunday for this, increased fatigue, low energy, low grade fever, feeling terrible with prod cough and congestion, they gave her antibiotics and steroids and she has completed those.  Within a few days her s/s are back and worsening.  She wants an appt with Dr Chava López if possible before her scheduled appt in June.  Went over protocols and guidelines with her on phone, she voiced understanding and knows that if she has active chest pain to be taken to the ER, she voiced understanding, but this is congestion she states.  Made a warm transfer to Dr López's office, spoke to scheduling and she made her an appt with the JAIRO Huang for tomorrow 5/25 at 8am.  Pt knows to call us back if she worsens or go to the ER if active chest pain.

## 2023-05-24 NOTE — TELEPHONE ENCOUNTER
"Reason for Disposition   Wheezing is present    Additional Information   Negative: SEVERE difficulty breathing (e.g., struggling for each breath, speaks in single words)   Negative: Bluish (or gray) lips or face now   Negative: [1] Difficulty breathing AND [2] exposure to flames, smoke, or fumes   Negative: [1] Stridor AND [2] difficulty breathing   Negative: Sounds like a life-threatening emergency to the triager   Negative: [1] Previous asthma attacks AND [2] this feels like asthma attack   Negative: Dry cough (non-productive;  no sputum or minimal clear sputum)   Negative: [1] MODERATE difficulty breathing (e.g., speaks in phrases, SOB even at rest, pulse 100-120) AND [2] still present when not coughing   Negative: Chest pain  (Exception: MILD central chest pain, present only when coughing.)   Negative: Patient sounds very sick or weak to the triager   Negative: [1] MILD difficulty breathing (e.g., minimal/no SOB at rest, SOB with walking, pulse <100) AND [2] still present when not coughing   Negative: [1] Coughed up blood AND [2] > 1 tablespoon (15 ml)   (Exception: Blood-tinged sputum.)   Negative: Fever > 103 F (39.4 C)   Negative: [1] Fever > 101 F (38.3 C) AND [2] age > 60 years   Negative: [1] Fever > 100.0 F (37.8 C) AND [2] bedridden (e.g., CVA, chronic illness, recovering from surgery)   Negative: [1] Fever > 100.0 F (37.8 C) AND [2] diabetes mellitus or weak immune system (e.g., HIV positive, cancer chemo, splenectomy, organ transplant, chronic steroids)    Answer Assessment - Initial Assessment Questions  1. ONSET: \"When did the cough begin?\"      Last Sunday   2. SEVERITY: \"How bad is the cough today?\"      Seems to be worsening  3. SPUTUM: \"Describe the color of your sputum\" (none, dry cough; clear, white, yellow, green)      White/yellow, lots of mucous/cough/congestion  4. HEMOPTYSIS: \"Are you coughing up any blood?\" If so ask: \"How much?\" (flecks, streaks, tablespoons, etc.)      none  5. " "DIFFICULTY BREATHING: \"Are you having difficulty breathing?\" If Yes, ask: \"How bad is it?\" (e.g., mild, moderate, severe)     - MILD: No SOB at rest, mild SOB with walking, speaks normally in sentences, can lie down, no retractions, pulse < 100.     - MODERATE: SOB at rest, SOB with minimal exertion and prefers to sit, cannot lie down flat, speaks in phrases, mild retractions, audible wheezing, pulse 100-120.     - SEVERE: Very SOB at rest, speaks in single words, struggling to breathe, sitting hunched forward, retractions, pulse > 120       Mild to moderate   6. FEVER: \"Do you have a fever?\" If Yes, ask: \"What is your temperature, how was it measured, and when did it start?\"      Low grade fever last night and this morning   7. CARDIAC HISTORY: \"Do you have any history of heart disease?\" (e.g., heart attack, congestive heart failure)       no  8. LUNG HISTORY: \"Do you have any history of lung disease?\"  (e.g., pulmonary embolus, asthma, emphysema)      no  9. PE RISK FACTORS: \"Do you have a history of blood clots?\" (or: recent major surgery, recent prolonged travel, bedridden)      none  10. OTHER SYMPTOMS: \"Do you have any other symptoms?\" (e.g., runny nose, wheezing, chest pain)        Wheezing at times but was just on antibiotics and steroids last week from urgent care but now its all back again   11. PREGNANCY: \"Is there any chance you are pregnant?\" \"When was your last menstrual period?\"        N/a  12. TRAVEL: \"Have you traveled out of the country in the last month?\" (e.g., travel history, exposures)        no    Protocols used: Cough - Acute Productive-ADULT-AH    "

## 2023-05-25 ENCOUNTER — APPOINTMENT (OUTPATIENT)
Dept: CT IMAGING | Facility: HOSPITAL | Age: 58
End: 2023-05-25
Payer: MEDICAID

## 2023-05-25 ENCOUNTER — OFFICE VISIT (OUTPATIENT)
Dept: FAMILY MEDICINE CLINIC | Facility: CLINIC | Age: 58
End: 2023-05-25
Payer: MEDICAID

## 2023-05-25 ENCOUNTER — HOSPITAL ENCOUNTER (INPATIENT)
Facility: HOSPITAL | Age: 58
LOS: 4 days | Discharge: HOME | End: 2023-05-29
Attending: EMERGENCY MEDICINE | Admitting: INTERNAL MEDICINE
Payer: MEDICAID

## 2023-05-25 ENCOUNTER — APPOINTMENT (OUTPATIENT)
Dept: GENERAL RADIOLOGY | Facility: HOSPITAL | Age: 58
End: 2023-05-25
Payer: MEDICAID

## 2023-05-25 VITALS
HEIGHT: 62 IN | HEART RATE: 115 BPM | BODY MASS INDEX: 25.4 KG/M2 | SYSTOLIC BLOOD PRESSURE: 104 MMHG | TEMPERATURE: 98.4 F | WEIGHT: 138 LBS | DIASTOLIC BLOOD PRESSURE: 67 MMHG | OXYGEN SATURATION: 82 %

## 2023-05-25 DIAGNOSIS — B97.81 BRONCHITIS DUE TO HUMAN METAPNEUMOVIRUS (HMPV): Primary | ICD-10-CM

## 2023-05-25 DIAGNOSIS — R06.02 SHORTNESS OF BREATH: ICD-10-CM

## 2023-05-25 DIAGNOSIS — R09.02 HYPOXIA: ICD-10-CM

## 2023-05-25 DIAGNOSIS — J18.9 ATYPICAL PNEUMONIA: Primary | ICD-10-CM

## 2023-05-25 DIAGNOSIS — J40 BRONCHITIS DUE TO HUMAN METAPNEUMOVIRUS (HMPV): Primary | ICD-10-CM

## 2023-05-25 DIAGNOSIS — R06.00 DYSPNEA, UNSPECIFIED TYPE: ICD-10-CM

## 2023-05-25 DIAGNOSIS — J06.9 UPPER RESPIRATORY TRACT INFECTION, UNSPECIFIED TYPE: ICD-10-CM

## 2023-05-25 LAB
ALBUMIN SERPL-MCNC: 4.2 G/DL (ref 3.5–5.2)
ALBUMIN/GLOB SERPL: 1.5 G/DL
ALP SERPL-CCNC: 65 U/L (ref 39–117)
ALT SERPL W P-5'-P-CCNC: 19 U/L (ref 1–33)
ANION GAP SERPL CALCULATED.3IONS-SCNC: 12 MMOL/L (ref 5–15)
ARTERIAL PATENCY WRIST A: POSITIVE
AST SERPL-CCNC: 30 U/L (ref 1–32)
ATMOSPHERIC PRESS: ABNORMAL MM[HG]
B PARAPERT DNA SPEC QL NAA+PROBE: NOT DETECTED
B PERT DNA SPEC QL NAA+PROBE: NOT DETECTED
BASE EXCESS BLDA CALC-SCNC: 4.2 MMOL/L (ref 0–3)
BASOPHILS # BLD AUTO: 0 10*3/MM3 (ref 0–0.2)
BASOPHILS NFR BLD AUTO: 0.3 % (ref 0–1.5)
BDY SITE: ABNORMAL
BILIRUB SERPL-MCNC: 0.3 MG/DL (ref 0–1.2)
BUN SERPL-MCNC: 12 MG/DL (ref 8–23)
BUN/CREAT SERPL: 18.5 (ref 7–25)
C PNEUM DNA NPH QL NAA+NON-PROBE: NOT DETECTED
CALCIUM SPEC-SCNC: 8.9 MG/DL (ref 8.6–10.5)
CHLORIDE SERPL-SCNC: 92 MMOL/L (ref 98–107)
CHOLEST SERPL-MCNC: 151 MG/DL (ref 0–200)
CO2 BLDA-SCNC: 31.2 MMOL/L (ref 22–29)
CO2 SERPL-SCNC: 29 MMOL/L (ref 22–29)
CREAT SERPL-MCNC: 0.65 MG/DL (ref 0.57–1)
D DIMER PPP FEU-MCNC: 0.35 MG/L (FEU) (ref 0–0.6)
D-LACTATE SERPL-SCNC: 0.7 MMOL/L (ref 0.2–2)
DEPRECATED RDW RBC AUTO: 44.6 FL (ref 37–54)
EGFRCR SERPLBLD CKD-EPI 2021: 100.9 ML/MIN/1.73
EOSINOPHIL # BLD AUTO: 0 10*3/MM3 (ref 0–0.4)
EOSINOPHIL NFR BLD AUTO: 0 % (ref 0.3–6.2)
ERYTHROCYTE [DISTWIDTH] IN BLOOD BY AUTOMATED COUNT: 13.4 % (ref 12.3–15.4)
FLUAV SUBTYP SPEC NAA+PROBE: NOT DETECTED
FLUBV RNA ISLT QL NAA+PROBE: NOT DETECTED
GLOBULIN UR ELPH-MCNC: 2.8 GM/DL
GLUCOSE SERPL-MCNC: 112 MG/DL (ref 65–99)
HADV DNA SPEC NAA+PROBE: NOT DETECTED
HBA1C MFR BLD: 5.6 % (ref 4.8–5.6)
HCO3 BLDA-SCNC: 29.8 MMOL/L (ref 21–28)
HCOV 229E RNA SPEC QL NAA+PROBE: NOT DETECTED
HCOV HKU1 RNA SPEC QL NAA+PROBE: NOT DETECTED
HCOV NL63 RNA SPEC QL NAA+PROBE: NOT DETECTED
HCOV OC43 RNA SPEC QL NAA+PROBE: NOT DETECTED
HCT VFR BLD AUTO: 39.2 % (ref 34–46.6)
HDLC SERPL-MCNC: 64 MG/DL (ref 40–60)
HEMODILUTION: NO
HGB BLD-MCNC: 13 G/DL (ref 12–15.9)
HMPV RNA NPH QL NAA+NON-PROBE: DETECTED
HOLD SPECIMEN: NORMAL
HPIV1 RNA ISLT QL NAA+PROBE: NOT DETECTED
HPIV2 RNA SPEC QL NAA+PROBE: NOT DETECTED
HPIV3 RNA NPH QL NAA+PROBE: NOT DETECTED
HPIV4 P GENE NPH QL NAA+PROBE: NOT DETECTED
INHALED O2 CONCENTRATION: 28 %
LDLC SERPL CALC-MCNC: 77 MG/DL (ref 0–100)
LDLC/HDLC SERPL: 1.21 {RATIO}
LYMPHOCYTES # BLD AUTO: 1.3 10*3/MM3 (ref 0.7–3.1)
LYMPHOCYTES NFR BLD AUTO: 12.7 % (ref 19.6–45.3)
M PNEUMO IGG SER IA-ACNC: NOT DETECTED
MCH RBC QN AUTO: 30 PG (ref 26.6–33)
MCHC RBC AUTO-ENTMCNC: 33.2 G/DL (ref 31.5–35.7)
MCV RBC AUTO: 90.5 FL (ref 79–97)
MODALITY: ABNORMAL
MONOCYTES # BLD AUTO: 0.7 10*3/MM3 (ref 0.1–0.9)
MONOCYTES NFR BLD AUTO: 7.2 % (ref 5–12)
NEUTROPHILS NFR BLD AUTO: 7.9 10*3/MM3 (ref 1.7–7)
NEUTROPHILS NFR BLD AUTO: 79.8 % (ref 42.7–76)
NRBC BLD AUTO-RTO: 0 /100 WBC (ref 0–0.2)
NT-PROBNP SERPL-MCNC: 97.2 PG/ML (ref 0–900)
PCO2 BLDA: 46.7 MM HG (ref 35–48)
PH BLDA: 7.41 PH UNITS (ref 7.35–7.45)
PLATELET # BLD AUTO: 136 10*3/MM3 (ref 140–450)
PMV BLD AUTO: 9.8 FL (ref 6–12)
PO2 BLDA: 83.3 MM HG (ref 83–108)
POTASSIUM SERPL-SCNC: 3.7 MMOL/L (ref 3.5–5.2)
PROT SERPL-MCNC: 7 G/DL (ref 6–8.5)
RBC # BLD AUTO: 4.33 10*6/MM3 (ref 3.77–5.28)
RHINOVIRUS RNA SPEC NAA+PROBE: NOT DETECTED
RSV RNA NPH QL NAA+NON-PROBE: NOT DETECTED
SAO2 % BLDCOA: 96.2 % (ref 94–98)
SARS-COV-2 RNA NPH QL NAA+NON-PROBE: NOT DETECTED
SODIUM SERPL-SCNC: 133 MMOL/L (ref 136–145)
T4 FREE SERPL-MCNC: 1.62 NG/DL (ref 0.93–1.7)
TRIGL SERPL-MCNC: 48 MG/DL (ref 0–150)
TROPONIN T SERPL HS-MCNC: 8 NG/L
TSH SERPL DL<=0.05 MIU/L-ACNC: 0.23 UIU/ML (ref 0.27–4.2)
VLDLC SERPL-MCNC: 10 MG/DL (ref 5–40)
WBC NRBC COR # BLD: 9.9 10*3/MM3 (ref 3.4–10.8)
WHOLE BLOOD HOLD COAG: NORMAL
WHOLE BLOOD HOLD SPECIMEN: NORMAL

## 2023-05-25 PROCEDURE — 36600 WITHDRAWAL OF ARTERIAL BLOOD: CPT

## 2023-05-25 PROCEDURE — 1159F MED LIST DOCD IN RCRD: CPT

## 2023-05-25 PROCEDURE — 94799 UNLISTED PULMONARY SVC/PX: CPT

## 2023-05-25 PROCEDURE — 0202U NFCT DS 22 TRGT SARS-COV-2: CPT | Performed by: PHYSICIAN ASSISTANT

## 2023-05-25 PROCEDURE — 3078F DIAST BP <80 MM HG: CPT

## 2023-05-25 PROCEDURE — 99213 OFFICE O/P EST LOW 20 MIN: CPT

## 2023-05-25 PROCEDURE — G0378 HOSPITAL OBSERVATION PER HR: HCPCS

## 2023-05-25 PROCEDURE — 80050 GENERAL HEALTH PANEL: CPT | Performed by: PHYSICIAN ASSISTANT

## 2023-05-25 PROCEDURE — 25010000002 METHYLPREDNISOLONE PER 125 MG: Performed by: PHYSICIAN ASSISTANT

## 2023-05-25 PROCEDURE — 93005 ELECTROCARDIOGRAM TRACING: CPT | Performed by: EMERGENCY MEDICINE

## 2023-05-25 PROCEDURE — 83605 ASSAY OF LACTIC ACID: CPT

## 2023-05-25 PROCEDURE — 80061 LIPID PANEL: CPT | Performed by: NURSE PRACTITIONER

## 2023-05-25 PROCEDURE — 71250 CT THORAX DX C-: CPT

## 2023-05-25 PROCEDURE — 84484 ASSAY OF TROPONIN QUANT: CPT | Performed by: PHYSICIAN ASSISTANT

## 2023-05-25 PROCEDURE — 94640 AIRWAY INHALATION TREATMENT: CPT

## 2023-05-25 PROCEDURE — 84439 ASSAY OF FREE THYROXINE: CPT | Performed by: STUDENT IN AN ORGANIZED HEALTH CARE EDUCATION/TRAINING PROGRAM

## 2023-05-25 PROCEDURE — 63710000001 PREDNISONE PER 1 MG: Performed by: NURSE PRACTITIONER

## 2023-05-25 PROCEDURE — 71045 X-RAY EXAM CHEST 1 VIEW: CPT

## 2023-05-25 PROCEDURE — 85379 FIBRIN DEGRADATION QUANT: CPT | Performed by: PHYSICIAN ASSISTANT

## 2023-05-25 PROCEDURE — 83036 HEMOGLOBIN GLYCOSYLATED A1C: CPT | Performed by: NURSE PRACTITIONER

## 2023-05-25 PROCEDURE — 1160F RVW MEDS BY RX/DR IN RCRD: CPT

## 2023-05-25 PROCEDURE — 3074F SYST BP LT 130 MM HG: CPT

## 2023-05-25 PROCEDURE — 25010000002 ENOXAPARIN PER 10 MG: Performed by: NURSE PRACTITIONER

## 2023-05-25 PROCEDURE — 63710000001 PREDNISONE PER 5 MG: Performed by: NURSE PRACTITIONER

## 2023-05-25 PROCEDURE — 82803 BLOOD GASES ANY COMBINATION: CPT

## 2023-05-25 PROCEDURE — 83880 ASSAY OF NATRIURETIC PEPTIDE: CPT | Performed by: PHYSICIAN ASSISTANT

## 2023-05-25 PROCEDURE — 87040 BLOOD CULTURE FOR BACTERIA: CPT | Performed by: PHYSICIAN ASSISTANT

## 2023-05-25 PROCEDURE — 99285 EMERGENCY DEPT VISIT HI MDM: CPT

## 2023-05-25 RX ORDER — PREDNISONE 20 MG/1
20 TABLET ORAL DAILY
Status: COMPLETED | OUTPATIENT
Start: 2023-05-27 | End: 2023-05-28

## 2023-05-25 RX ORDER — HYDROCHLOROTHIAZIDE 25 MG/1
25 TABLET ORAL
Status: DISCONTINUED | OUTPATIENT
Start: 2023-05-26 | End: 2023-05-29 | Stop reason: HOSPADM

## 2023-05-25 RX ORDER — ACETAMINOPHEN 325 MG/1
650 TABLET ORAL EVERY 4 HOURS PRN
Status: DISCONTINUED | OUTPATIENT
Start: 2023-05-25 | End: 2023-05-29 | Stop reason: HOSPADM

## 2023-05-25 RX ORDER — SODIUM CHLORIDE 0.9 % (FLUSH) 0.9 %
10 SYRINGE (ML) INJECTION AS NEEDED
Status: DISCONTINUED | OUTPATIENT
Start: 2023-05-25 | End: 2023-05-29 | Stop reason: HOSPADM

## 2023-05-25 RX ORDER — METHYLPREDNISOLONE SODIUM SUCCINATE 40 MG/ML
40 INJECTION, POWDER, LYOPHILIZED, FOR SOLUTION INTRAMUSCULAR; INTRAVENOUS EVERY 24 HOURS
Status: DISCONTINUED | OUTPATIENT
Start: 2023-05-26 | End: 2023-05-25

## 2023-05-25 RX ORDER — IPRATROPIUM BROMIDE AND ALBUTEROL SULFATE 2.5; .5 MG/3ML; MG/3ML
3 SOLUTION RESPIRATORY (INHALATION)
Status: DISCONTINUED | OUTPATIENT
Start: 2023-05-25 | End: 2023-05-25

## 2023-05-25 RX ORDER — NICOTINE 21 MG/24HR
1 PATCH, TRANSDERMAL 24 HOURS TRANSDERMAL
Status: DISCONTINUED | OUTPATIENT
Start: 2023-05-25 | End: 2023-05-29 | Stop reason: HOSPADM

## 2023-05-25 RX ORDER — NITROGLYCERIN 0.4 MG/1
0.4 TABLET SUBLINGUAL
Status: DISCONTINUED | OUTPATIENT
Start: 2023-05-25 | End: 2023-05-29 | Stop reason: HOSPADM

## 2023-05-25 RX ORDER — LEVOTHYROXINE SODIUM 112 UG/1
112 TABLET ORAL DAILY
Status: DISCONTINUED | OUTPATIENT
Start: 2023-05-25 | End: 2023-05-25

## 2023-05-25 RX ORDER — IPRATROPIUM BROMIDE AND ALBUTEROL SULFATE 2.5; .5 MG/3ML; MG/3ML
3 SOLUTION RESPIRATORY (INHALATION)
Status: DISCONTINUED | OUTPATIENT
Start: 2023-05-25 | End: 2023-05-29 | Stop reason: HOSPADM

## 2023-05-25 RX ORDER — LEVOTHYROXINE SODIUM 88 UG/1
88 TABLET ORAL
Status: DISCONTINUED | OUTPATIENT
Start: 2023-05-26 | End: 2023-05-29 | Stop reason: HOSPADM

## 2023-05-25 RX ORDER — PREDNISONE 10 MG/1
10 TABLET ORAL DAILY
Status: COMPLETED | OUTPATIENT
Start: 2023-05-29 | End: 2023-05-29

## 2023-05-25 RX ORDER — SODIUM CHLORIDE 9 MG/ML
40 INJECTION, SOLUTION INTRAVENOUS AS NEEDED
Status: DISCONTINUED | OUTPATIENT
Start: 2023-05-25 | End: 2023-05-29 | Stop reason: HOSPADM

## 2023-05-25 RX ORDER — METHYLPREDNISOLONE SODIUM SUCCINATE 125 MG/2ML
125 INJECTION, POWDER, LYOPHILIZED, FOR SOLUTION INTRAMUSCULAR; INTRAVENOUS ONCE
Status: COMPLETED | OUTPATIENT
Start: 2023-05-25 | End: 2023-05-25

## 2023-05-25 RX ORDER — SODIUM CHLORIDE 0.9 % (FLUSH) 0.9 %
10 SYRINGE (ML) INJECTION EVERY 12 HOURS SCHEDULED
Status: DISCONTINUED | OUTPATIENT
Start: 2023-05-25 | End: 2023-05-29 | Stop reason: HOSPADM

## 2023-05-25 RX ORDER — BUDESONIDE 0.5 MG/2ML
0.5 INHALANT ORAL
Status: DISCONTINUED | OUTPATIENT
Start: 2023-05-25 | End: 2023-05-29 | Stop reason: HOSPADM

## 2023-05-25 RX ORDER — AMLODIPINE BESYLATE 5 MG/1
5 TABLET ORAL DAILY
Status: DISCONTINUED | OUTPATIENT
Start: 2023-05-26 | End: 2023-05-29 | Stop reason: HOSPADM

## 2023-05-25 RX ORDER — ENOXAPARIN SODIUM 100 MG/ML
40 INJECTION SUBCUTANEOUS EVERY 24 HOURS
Status: DISCONTINUED | OUTPATIENT
Start: 2023-05-25 | End: 2023-05-29 | Stop reason: HOSPADM

## 2023-05-25 RX ORDER — LISINOPRIL 20 MG/1
20 TABLET ORAL
Status: DISCONTINUED | OUTPATIENT
Start: 2023-05-26 | End: 2023-05-29 | Stop reason: HOSPADM

## 2023-05-25 RX ADMIN — IPRATROPIUM BROMIDE AND ALBUTEROL SULFATE 3 ML: .5; 3 SOLUTION RESPIRATORY (INHALATION) at 12:58

## 2023-05-25 RX ADMIN — ENOXAPARIN SODIUM 40 MG: 100 INJECTION SUBCUTANEOUS at 15:51

## 2023-05-25 RX ADMIN — BUDESONIDE 0.5 MG: 0.5 INHALANT RESPIRATORY (INHALATION) at 13:03

## 2023-05-25 RX ADMIN — PREDNISONE 30 MG: 20 TABLET ORAL at 15:52

## 2023-05-25 RX ADMIN — METHYLPREDNISOLONE SODIUM SUCCINATE 125 MG: 125 INJECTION, POWDER, FOR SOLUTION INTRAMUSCULAR; INTRAVENOUS at 09:57

## 2023-05-25 RX ADMIN — BUDESONIDE 0.5 MG: 0.5 INHALANT RESPIRATORY (INHALATION) at 20:15

## 2023-05-25 RX ADMIN — NICOTINE 1 PATCH: 21 PATCH, EXTENDED RELEASE TRANSDERMAL at 15:51

## 2023-05-25 RX ADMIN — IPRATROPIUM BROMIDE AND ALBUTEROL SULFATE 3 ML: .5; 3 SOLUTION RESPIRATORY (INHALATION) at 15:20

## 2023-05-25 RX ADMIN — Medication 10 ML: at 20:18

## 2023-05-25 RX ADMIN — IPRATROPIUM BROMIDE AND ALBUTEROL SULFATE 3 ML: .5; 3 SOLUTION RESPIRATORY (INHALATION) at 20:20

## 2023-05-25 RX ADMIN — SODIUM CHLORIDE 1000 ML: 9 INJECTION, SOLUTION INTRAVENOUS at 09:56

## 2023-05-25 NOTE — ED PROVIDER NOTES
Subjective   History of Present Illness  Chief Complaint: Shortness of air, upper respiratory symptoms for 2 weeks    Patient is a 58-year-old  female with ahistory of hypertension who complains of prolonged upper respiratory symptoms, and shortness of air.  Patient presented to her primary care provider this morning for prolonged upper respiratory symptoms where they measured her oxygen saturation on room air to be 82%, they advised her to proceed to the ED. Patient reports multiple trials of antibiotics and steroids, every time she finishes these medications her symptoms come back.  Patient reports congestion, cough, fevers.  Patient reports intermittently coughing up sputum.  Patient reports over-the-counter Mucinex helps, and activity worsens her shortness of air.  Patient admits to running a fever of 101 degrees at home, and that she took some Aleve this morning at 7:30 AM to treat her fever.  Patient admits to body aches and a recent change in appetite.  Patient denies any pain with deep inspiration, nausea, vomiting, diarrhea, dysuria.  Patient denies recent travel lower extremity edema.     PCP: Chava López    History provided by:  Patient      Review of Systems   Constitutional: Positive for appetite change and fever.   HENT: Positive for congestion and rhinorrhea. Negative for sore throat and trouble swallowing.    Eyes: Negative.    Respiratory: Positive for cough and shortness of breath. Negative for wheezing.    Cardiovascular: Negative for chest pain and leg swelling.   Gastrointestinal: Negative for abdominal pain, diarrhea, nausea and vomiting.   Endocrine: Negative.    Genitourinary: Negative for dysuria.   Musculoskeletal: Negative for myalgias.   Skin: Negative for rash.   Allergic/Immunologic: Negative.    Neurological: Negative for headaches.   Psychiatric/Behavioral: Negative for behavioral problems.   All other systems reviewed and are negative.      Past Medical History:    Diagnosis Date   • Acute URI    • Hypertension    • Hypothyroidism    • Overweight        No Known Allergies    History reviewed. No pertinent surgical history.    Family History   Problem Relation Age of Onset   • Hypertension Mother    • Aneurysm Mother    • Arthritis Mother    • Hypertension Sister    • Aneurysm Brother        Social History     Socioeconomic History   • Marital status: Single   Tobacco Use   • Smoking status: Every Day     Packs/day: 0.50     Types: Cigarettes     Start date: 1982   Vaping Use   • Vaping Use: Never used   Substance and Sexual Activity   • Alcohol use: Yes     Comment: SOCIAL   • Drug use: Not Currently   • Sexual activity: Defer           Objective   Physical Exam  Vitals and nursing note reviewed.   Constitutional:       General: She is not in acute distress.     Appearance: She is well-developed and normal weight. She is not diaphoretic.   HENT:      Head: Normocephalic and atraumatic.   Eyes:      Extraocular Movements: Extraocular movements intact.      Pupils: Pupils are equal, round, and reactive to light.   Cardiovascular:      Rate and Rhythm: Normal rate and regular rhythm.      Heart sounds: Normal heart sounds. No murmur heard.  Pulmonary:      Effort: Pulmonary effort is normal.      Breath sounds: Examination of the right-lower field reveals wheezing. Examination of the left-lower field reveals wheezing. Decreased breath sounds and wheezing present.      Comments: Crackles  Chest:      Chest wall: No tenderness or crepitus.   Abdominal:      General: Bowel sounds are normal.      Palpations: Abdomen is soft.   Musculoskeletal:         General: Normal range of motion.      Cervical back: Normal range of motion.      Right lower leg: No tenderness. No edema.      Left lower leg: No tenderness. No edema.   Skin:     General: Skin is warm.      Capillary Refill: Capillary refill takes less than 2 seconds.   Neurological:      General: No focal deficit present.       "Mental Status: She is alert and oriented to person, place, and time.   Psychiatric:         Mood and Affect: Mood normal.         Behavior: Behavior normal.         ECG 12 Lead      Date/Time: 5/25/2023 1:02 PM  Performed by: Yris Vazquez PA  Authorized by: Jonny Reyes MD   Interpreted by physician  Previous ECG: no previous ECG available  Rhythm: sinus rhythm  Rate: normal  BPM: 99  QRS axis: normal  Conduction: conduction normal  Clinical impression: non-specific ECG                 ED Course  ED Course as of 05/25/23 2037   Thu May 25, 2023   1225 Patient reevaluated, reports that she is feeling somewhat better.  Blood pressure improved.  Trialed patient on room air, oxygen dropped to 88 to 89% on room air and she was placed back on 2 L per nasal cannula []   1232 I spoke with ADA Pearson who agreed to accept patient for admission for Dr. Mai  []      ED Course User Index  [] Yris Vazquez PA    /54 (BP Location: Right arm, Patient Position: Sitting)   Pulse 100   Temp 98 °F (36.7 °C) (Oral)   Resp 18   Ht 157.5 cm (62\")   Wt 62.5 kg (137 lb 12.8 oz)   SpO2 100%   BMI 25.20 kg/m²   Labs Reviewed   RESPIRATORY PANEL PCR W/ COVID-19 (SARS-COV-2) [facility] IN-HOUSE, NP SWAB IN UTM/VTP, 3-4 HR TAT - Abnormal; Notable for the following components:       Result Value    Human Metapneumovirus Detected (*)     All other components within normal limits    Narrative:     In the setting of a positive respiratory panel with a viral infection PLUS a negative procalcitonin without other underlying concern for bacterial infection, consider observing off antibiotics or discontinuation of antibiotics and continue supportive care. If the respiratory panel is positive for atypical bacterial infection (Bordetella pertussis, Chlamydophila pneumoniae, or Mycoplasma pneumoniae), consider antibiotic de-escalation to target atypical bacterial infection.   COMPREHENSIVE METABOLIC PANEL - " Abnormal; Notable for the following components:    Glucose 112 (*)     Sodium 133 (*)     Chloride 92 (*)     All other components within normal limits    Narrative:     GFR Normal >60  Chronic Kidney Disease <60  Kidney Failure <15     CBC WITH AUTO DIFFERENTIAL - Abnormal; Notable for the following components:    Platelets 136 (*)     Neutrophil % 79.8 (*)     Lymphocyte % 12.7 (*)     Eosinophil % 0.0 (*)     Neutrophils, Absolute 7.90 (*)     All other components within normal limits   BLOOD GAS, ARTERIAL - Abnormal; Notable for the following components:    HCO3, Arterial 29.8 (*)     Base Excess, Arterial 4.2 (*)     CO2 Content 31.2 (*)     All other components within normal limits   LIPID PANEL - Abnormal; Notable for the following components:    HDL Cholesterol 64 (*)     All other components within normal limits    Narrative:     Cholesterol Reference Ranges  (U.S. Department of Health and Human Services ATP III Classifications)    Desirable          <200 mg/dL  Borderline High    200-239 mg/dL  High Risk          >240 mg/dL      Triglyceride Reference Ranges  (U.S. Department of Health and Human Services ATP III Classifications)    Normal           <150 mg/dL  Borderline High  150-199 mg/dL  High             200-499 mg/dL  Very High        >500 mg/dL    HDL Reference Ranges  (U.S. Department of Health and Human Services ATP III Classifications)    Low     <40 mg/dl (major risk factor for CHD)  High    >60 mg/dl ('negative' risk factor for CHD)        LDL Reference Ranges  (U.S. Department of Health and Human Services ATP III Classifications)    Optimal          <100 mg/dL  Near Optimal     100-129 mg/dL  Borderline High  130-159 mg/dL  High             160-189 mg/dL  Very High        >189 mg/dL   TSH - Abnormal; Notable for the following components:    TSH 0.229 (*)     All other components within normal limits   BNP (IN-HOUSE) - Normal    Narrative:     Among patients with dyspnea, NT-proBNP is highly  "sensitive for the detection of acute congestive heart failure. In addition NT-proBNP of <300 pg/ml effectively rules out acute congestive heart failure with 99% negative predictive value.     SINGLE HSTROPONIN T - Normal    Narrative:     High Sensitive Troponin T Reference Range:  <10.0 ng/L- Negative Female for AMI  <15.0 ng/L- Negative Male for AMI  >=10 - Abnormal Female indicating possible myocardial injury.  >=15 - Abnormal Male indicating possible myocardial injury.   Clinicians would have to utilize clinical acumen, EKG, Troponin, and serial changes to determine if it is an Acute Myocardial Infarction or myocardial injury due to an underlying chronic condition.        D-DIMER, QUANTITATIVE - Normal    Narrative:     According to the assay 's published package insert, a normal (<0.50 mg/L (FEU)) D-dimer result in conjunction with a non-high clinical probability assessment, excludes deep vein thrombosis (DVT) and pulmonary embolism (PE) with high sensitivity.    D-dimer values increase with age and this can make VTE exclusion of an older population difficult. To address this, the American College [employer], based on best available evidence and recent guidelines, recommends that clinicians use age-adjusted D-dimer thresholds in patients greater than 50 years of age with: a) a low probability of PE who do not meet all Pulmonary Embolism Rule Out Criteria, or b) in those with intermediate probability of PE.   The formula for an age-adjusted D-dimer cut-off is \"age/100\".  For example, a 60 year old patient would have an age-adjusted cut-off of 0.60 mg/L (FEU) and an 80 year old 0.80 mg/L (FEU).   HEMOGLOBIN A1C - Normal   POC LACTATE - Normal   BLOOD CULTURE   BLOOD CULTURE   RESPIRATORY CULTURE   RAINBOW DRAW    Narrative:     The following orders were created for panel order [place] Draw.  Procedure                               Abnormality         Status                     ---------             "                   -----------         ------                     Green Top (Gel)[[id number]]                                                             Lavender Top[[id number]]                                     Final result               Gold Top - SST[[id number]]                                   Final result               Light Blue Top[[id number]]                                   Final result                 Please view results for these tests on the individual orders.   BLOOD GAS, ARTERIAL   POC LACTATE   CBC AND DIFFERENTIAL    Narrative:     The following orders were created for panel order CBC & Differential.  Procedure                               Abnormality         Status                     ---------                               -----------         ------                     CBC Auto Differential[[id number]]        Abnormal            Final result                 Please view results for these tests on the individual orders.   LAVENDER TOP   GOLD TOP - SST   LIGHT BLUE TOP     Medications   sodium chloride 0.9 % flush 10 mL (has no administration in time range)   sodium chloride 0.9 % flush 10 mL (10 mL Intravenous Given 5/25/23 2018)   sodium chloride 0.9 % flush 10 mL (has no administration in time range)   sodium chloride 0.9 % infusion 40 mL (has no administration in time range)   Pharmacy to Dose enoxaparin (LOVENOX) (has no administration in time range)   nitroglycerin (NITROSTAT) SL tablet 0.4 mg (has no administration in time range)   acetaminophen (TYLENOL) tablet 650 mg (has no administration in time range)   ipratropium-albuterol (DUO-NEB) nebulizer solution 3 mL (3 mL Nebulization Given 5/25/23 2020)   budesonide (PULMICORT) nebulizer solution 0.5 mg (0.5 mg Nebulization Given 5/25/23 2015)   Enoxaparin Sodium (LOVENOX) syringe 40 mg (40 mg Subcutaneous Given 5/25/23 1551)   predniSONE (DELTASONE) tablet 30 mg (30 mg Oral Given 5/25/23 1552)     Followed by   predniSONE (DELTASONE) tablet 20  mg (has no administration in time range)     Followed by   predniSONE (DELTASONE) tablet 10 mg (10 mg Oral Not Given 5/25/23 1550)   nicotine (NICODERM CQ) 21 MG/24HR patch 1 patch (1 patch Transdermal Medication Applied 5/25/23 9801)   amLODIPine (NORVASC) tablet 5 mg (has no administration in time range)   lisinopril (PRINIVIL,ZESTRIL) tablet 20 mg (has no administration in time range)     And   hydroCHLOROthiazide (HYDRODIURIL) tablet 25 mg (has no administration in time range)   levothyroxine (SYNTHROID, LEVOTHROID) tablet 88 mcg (has no administration in time range)   sodium chloride 0.9 % bolus 1,000 mL (0 mL Intravenous Stopped 5/25/23 1105)   methylPREDNISolone sodium succinate (SOLU-Medrol) injection 125 mg (125 mg Intravenous Given 5/25/23 0924)     CT Chest Without Contrast Diagnostic    Result Date: 5/25/2023  Scattered bilateral small nodular infiltrates which may represent developing pneumonia versus pneumonitis. No consolidation or pleural effusion. Electronically Signed: Nain Dodd  5/25/2023 4:52 PM EDT  Workstation ID: [id number]    XR Chest 1 View    Result Date: 5/25/2023  Impression: 1.An acute pulmonary process is not apparent. Electronically Signed: Soto Tello  5/25/2023 9:59 AM EDT  Workstation ID: [id number]                                           Medical Decision Making  Differential Dx (Includes but not limited to): COPD exacerbation, CHF exacerbation, pneumonia, PE, pleural effusion, viral syndrome, bronchitis  Medical Records Reviewed: Patient seen at primary care office today for follow-up and was advised to come to the ER for further evaluation for atypical pneumonia.  Labs: On my interpretation lactate 0.7, blood cultures pending.  ABG pH normal PaO2 83.  CBC no leukocytosis.  Respiratory panel positive for human metapneumovirus.  D-dimer 0.35.  Troponin 8  Imaging: Chest x-ray shows no obvious pneumonia or pleural effusion  Telemetry: EKG interpretation: Reviewed myself  interpreted by ER attending, sinus rhythm rate of 99 with biatrial enlargement, no acute ST changes.  Testing considered but not ordered: CT head, patient denies headache or head injury  Nature of Complaint: Acute  Admission vs Discharge: Admit  Discussion: While in the ED IV was placed and labs were obtained appropriate PPE was worn during exam and throughout all encounters with the patient.  Patient had the above evaluation.  IV established, lab work obtained.  On arrival to the ER patient O2 saturation 82%.  She was placed on 2 L per nasal cannula.  Lab work reassuring, ABG shows normal pH with PaO2 of 83.  Normal troponin, D-dimer.  Respiratory panel positive for human metapneumovirus.  Patient was given Solu-Medrol and DuoNeb treatment with some improvement in her wheezing.  Suspect some degree of COPD as well.  Chest x-ray unremarkable.  Patient continued to require supplemental oxygen which is new for her.  Patient will be admitted for further evaluation and treatment due to this.  I spoke with ADA Pearson who agreed to accept patient for admission for the hospitalist group.    Bronchitis due to human metapneumovirus (hMPV): complicated acute illness or injury  Dyspnea, unspecified type: complicated acute illness or injury  Hypoxia: complicated acute illness or injury  Amount and/or Complexity of Data Reviewed  External Data Reviewed: notes.  Labs: ordered. Decision-making details documented in ED Course.  Radiology: ordered. Decision-making details documented in ED Course.  ECG/medicine tests: ordered and independent interpretation performed. Decision-making details documented in ED Course.      Risk  Prescription drug management.  Decision regarding hospitalization.          Final diagnoses:   Bronchitis due to human metapneumovirus (hMPV)   Dyspnea, unspecified type   Hypoxia       ED Disposition  ED Disposition     ED Disposition   Decision to Admit    Condition   --    Comment   Level of Care: Telemetry  [5]   Admitting Physician: JUSTIN CHIRINOS [[id number]]   Attending Physician: JUSTIN CHIRINOS [[id number]]               No follow-up provider specified.       Medication List      No changes were made to your prescriptions during this visit.          Yris Vazquez PA  05/25/23 2037

## 2023-05-25 NOTE — PLAN OF CARE
Goal Outcome Evaluation:  Plan of Care Reviewed With: patient      Pt admit to 2A this shift. Breathing tx, and PO steroids given. 2L NC. No complaints of pain.

## 2023-05-25 NOTE — Clinical Note
Level of Care: Telemetry [5]   Diagnosis: Hypoxia [513927]   Admitting Physician: JUSTIN CHIRINOS [[id number]]   Attending Physician: JUSTIN CHIRINOS [[id number]]

## 2023-05-25 NOTE — H&P
[facility] Medicine Services  History & Physical    Patient Name: Kirby Gunter  : 1965  MRN: [id number]  Primary Care Physician:  Chava López MD  Date of admission: 2023  Date and Time of Service: 2023 at 13:02 EDT    Subjective      Chief Complaint: Hypoxia    History of Present Illness: Kirby Gunter is a 58 y.o. female who presented to [facility] on 2023 from Dr. López's office, due to low oxygen level. She reports having respiratory illnesses off and on since March. She improves for short periods and then becomes ill again. She reports yellow mucous production, denies SOB, and is a current smoker. She reports she had been living at her boyfriends home for a year up until the end of March this year. She reports she saw mold at her boyfriend's home. 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     XR Chest 1 View    Result Date: 2023  Impression: 1.An acute pulmonary process is not apparent. Electronically Signed: Soto Tello  2023 9:59 AM EDT  Workstation ID: [id number]    ECG 12 Lead Dyspnea   Preliminary Result   HEART RATE= 99  bpm   RR Interval= 608  ms   MI Interval= 152  ms   P Horizontal Axis= -15  deg   P Front Axis= 72  deg   QRSD Interval= 81  ms   QT Interval= 350  ms   QRS Axis= 27  deg   T Wave Axis= 84  deg   - BORDERLINE ECG -   Sinus rhythm   Biatrial enlargement   No previous ECG available for comparison   Electronically Signed By:    Date and Time of Study: 2023 09:21:17      ECG 12 Lead    (Results Pending)           Review of Systems   Constitutional: Positive for fever.   Respiratory: Positive for cough, shortness of breath, sputum production and wheezing.    All other systems reviewed and are negative.       Personal History     Past Medical History:   Diagnosis Date   • Acute URI    • Hypertension    • Hypothyroidism    • Overweight        No past surgical history on  file.    Family History: family history includes Aneurysm in her brother and mother; Arthritis in her mother; Hypertension in her mother and sister. Otherwise pertinent FHx was reviewed and not pertinent to current issue.    Social History:  reports that she has been smoking cigarettes. She started smoking about 41 years ago. She has been smoking an average of .5 packs per day. She has never used smokeless tobacco. She reports current alcohol use. She reports that she does not currently use drugs.    Home Medications:  Prior to Admission Medications     Prescriptions Last Dose Informant Patient Reported? Taking?    acyclovir (Zovirax) 5 % ointment   No No    Apply  topically to the appropriate area as directed 4 (Four) Times a Day.    albuterol sulfate  (90 Base) MCG/ACT inhaler   No No    Inhale 2 puffs Every 4 (Four) Hours As Needed for Wheezing or Shortness of Air.    amLODIPine (NORVASC) 5 MG tablet   No No    Take 1 tablet by mouth Daily.    Chlorcyclizine-Pseudoephed (Stahist AD) 25-60 MG tablet   No No    Take 0.5 tablets by mouth 2 (Two) Times a Day As Needed (sinus pressure, congestion).    guaiFENesin (Mucinex) 600 MG 12 hr tablet   No No    Take 2 tablets by mouth 2 (Two) Times a Day.    levothyroxine (SYNTHROID, LEVOTHROID) 88 MCG tablet   No No    TAKE ONE TABLET BY MOUTH DAILY    lisinopril-hydrochlorothiazide (PRINZIDE,ZESTORETIC) 20-25 MG per tablet   No No    TAKE ONE TABLET BY MOUTH DAILY            Allergies:  No Known Allergies    Objective      Vitals:   Temp:  [98.4 °F (36.9 °C)-98.6 °F (37 °C)] 98.6 °F (37 °C)  Heart Rate:  [] 93  Resp:  [14-20] 15  BP: ()/(55-70) 118/70  Flow (L/min):  [3] 3    Physical Exam  Vitals reviewed.   Constitutional:       Appearance: She is ill-appearing.   HENT:      Head: Normocephalic.   Eyes:      Pupils: Pupils are equal, round, and reactive to light.   Cardiovascular:      Rate and Rhythm: Normal rate and regular rhythm.      Pulses: Normal  pulses.      Heart sounds: Normal heart sounds.   Pulmonary:      Effort: Pulmonary effort is normal.      Comments: Sightly diminished bases bilaterally. No wheezing.  Abdominal:      General: Abdomen is flat.      Palpations: Abdomen is soft.   Musculoskeletal:         General: Normal range of motion.   Skin:     General: Skin is warm and dry.   Neurological:      Mental Status: She is alert and oriented to person, place, and time.          Result Review    Result Review:  I have personally reviewed the results from the time of this admission to 5/25/2023 13:00 EDT and agree with these findings:  [x]  Laboratory  [x]  Microbiology  [x]  Radiology  [x]  EKG/Telemetry   []  Cardiology/Vascular   []  Pathology  []  Old records  []  Other:      Assessment & Plan        Active Hospital Problems:  Active Hospital Problems    Diagnosis    • **Hypoxia      Plan:     Hypoxia  -CXR negative, ABG unremarkable, D-dimer 0.35, lactic 0.7, WBCs 9.90, BNP 97  -Respiratory panel positive for human metapneumovirus  -Currently requiring 2 L NC, continue to wean and maintain O2 sat > 90%  -6 day taper Prednisone  -DuoNebs and Pulmicort  -Incentive spirometry  -CT chest without  -Sputum culture  -Monitor a.m. labs  -may need to consider pulmonary consult for possible bronchoscopy due to recurrent respiratory illness    Current smoker  -Smoking cessation education  -NicoDerm    Hypertension  -/68  -Resume lisinopril-hydrochlorothiazide and amlodipine  -Hold if SBP <110    Hypothyroidism  -TSH 0.229  -Resume levothyroxine     DVT prophylaxis:  Medical DVT prophylaxis orders are present.    CODE STATUS:    Level Of Support Discussed With: Patient  Code Status (Patient has no pulse and is not breathing): CPR (Attempt to Resuscitate)  Medical Interventions (Patient has pulse or is breathing): Full Support    Admission Status:  I believe this patient meets observation status.    I discussed the patient's findings and my  recommendations with patient and family.      Signature: Electronically signed by JAIRO Mcfarlane, 05/25/23, 13:00 EDT.  [facility] Hospitalist Team

## 2023-05-25 NOTE — PROGRESS NOTES
"Chief Complaint  Cough and Nasal Congestion    Subjective        Kirby Gunter presents to [facility]  History of Present Illness    Pt is here today for cough, congestion. On 05/14 she presented to  and was diagnosed with atypical pneumonia and URI. She was given a zpack and medrol pack. She feels she has progressively gotten worse. She says she also sick in March and got to feeling better that time. She works at a day care. Yesterday she took mucinex and it helped some. She denies asthma and COPD. She is a smoker. She has been running a fever of 101 daily. She has been using the albuterol every 4-6 hours. She is coughing up yellow phlegm. Appetite is very decreased. She is drinking a lot of water. She reports fatigue, malaise, SOA. She reports intermittent sharp pain on left lower posterior chest with inspiration. She also reports right low back pain.     Objective   Vital Signs:  /67 (BP Location: Left arm, Patient Position: Sitting, Cuff Size: Adult)   Pulse 115   Temp 98.4 °F (36.9 °C) (Temporal)   Ht 157.5 cm (62\")   Wt 62.6 kg (138 lb)   SpO2 (!) 82%   BMI 25.24 kg/m²   Estimated body mass index is 25.24 kg/m² as calculated from the following:    Height as of this encounter: 157.5 cm (62\").    Weight as of this encounter: 62.6 kg (138 lb).          Review of Systems   Constitutional: Positive for appetite change, fatigue and fever. Negative for activity change, chills and diaphoresis.   HENT: Positive for congestion and sinus pressure. Negative for ear discharge, ear pain, postnasal drip and sore throat.    Eyes: Negative for pain, discharge, redness and itching.   Respiratory: Positive for cough and shortness of breath. Negative for chest tightness and wheezing.    Cardiovascular: Negative for chest pain, palpitations and leg swelling.   Gastrointestinal: Negative for abdominal pain, nausea and vomiting.   Musculoskeletal: Positive for back pain. Negative for " myalgias, neck pain and neck stiffness.   Skin: Negative for color change, rash and wound.   Neurological: Negative for dizziness, syncope, weakness, light-headedness and confusion.   Hematological: Negative for adenopathy.   Psychiatric/Behavioral: Negative for agitation, behavioral problems and decreased concentration. The patient is not nervous/anxious.         Physical Exam  Vitals reviewed.   Constitutional:       General: She is in acute distress.      Appearance: Normal appearance. She is ill-appearing. She is not toxic-appearing or diaphoretic.   HENT:      Head: Normocephalic and atraumatic.      Right Ear: Tympanic membrane, ear canal and external ear normal. There is no impacted cerumen.      Left Ear: Tympanic membrane, ear canal and external ear normal. There is no impacted cerumen.      Nose: Congestion present.      Mouth/Throat:      Mouth: Mucous membranes are moist.      Pharynx: Oropharynx is clear. Posterior oropharyngeal erythema present. No oropharyngeal exudate.   Eyes:      Conjunctiva/sclera: Conjunctivae normal.      Pupils: Pupils are equal, round, and reactive to light.   Cardiovascular:      Rate and Rhythm: Normal rate and regular rhythm.      Pulses: Normal pulses.      Heart sounds: Normal heart sounds.   Pulmonary:      Effort: Pulmonary effort is normal. No respiratory distress.      Breath sounds: Examination of the right-upper field reveals decreased breath sounds. Examination of the left-upper field reveals rhonchi and rales. Examination of the right-middle field reveals decreased breath sounds. Examination of the left-middle field reveals rhonchi and rales. Examination of the right-lower field reveals decreased breath sounds. Examination of the left-lower field reveals rhonchi and rales. Decreased breath sounds, rhonchi and rales present.   Musculoskeletal:      Cervical back: Normal range of motion and neck supple. No rigidity or tenderness.      Right lower leg: No edema.       Left lower leg: No edema.   Lymphadenopathy:      Cervical: Cervical adenopathy present.   Skin:     General: Skin is warm and dry.      Capillary Refill: Capillary refill takes more than 3 seconds.   Neurological:      General: No focal deficit present.      Mental Status: She is alert and oriented to person, place, and time.   Psychiatric:         Mood and Affect: Mood normal.         Behavior: Behavior normal.         Thought Content: Thought content normal.         Judgment: Judgment normal.        Result Review :                Assessment and Plan   Diagnoses and all orders for this visit:    1. Atypical pneumonia (Primary)  Comments:  -advised pt to go to ER    2. Shortness of breath    3. Upper respiratory tract infection, unspecified type             Follow Up   Return if symptoms worsen or fail to improve.  Patient was given instructions and counseling regarding her condition or for health maintenance advice. Please see specific information pulled into the AVS if appropriate.

## 2023-05-25 NOTE — LETTER
May 29, 2023     Patient: Kirby Gunter   YOB: 1965   Date of Visit: 5/25/2023       To Whom It May Concern:    It is my medical opinion that Kirby Gunter can return to work on or after 6/1/23.           Sincerely,      Jonny Gray MD    CC:   No Recipients

## 2023-05-25 NOTE — ED NOTES
Nursing report ED to floor  Kirby Gunter  58 y.o.  female    HPI:   Chief Complaint   Patient presents with    Shortness of Breath     URI, soa, congestion, cough, states sent from PMD due to low oxygen.       Admitting doctor:   Jonny Reyes MD    Admitting diagnosis:   The primary encounter diagnosis was Bronchitis due to human metapneumovirus (hMPV). Diagnoses of Dyspnea, unspecified type and Hypoxia were also pertinent to this visit.    Code status:   Current Code Status       Date Active Code Status Order ID Comments User Context       5/25/2023 1237 CPR (Attempt to Resuscitate) [id number]  Lili Andersen, [facility] ED        Question Answer    Code Status (Patient has no pulse and is not breathing) CPR (Attempt to Resuscitate)    Medical Interventions (Patient has pulse or is breathing) Full Support    Level Of Support Discussed With Patient                    Allergies:   Patient has no known allergies.    Isolation:  Contact and Droplet     Fall Risk:  Fall Risk Assessment was completed, and patient is at low risk for falls.   Predictive Model Details         7 (Low) Factor Value    Calculated 5/25/2023 10:08 Age 58    Risk of Fall Model Musculoskeletal Assessment WDL     Active Peripheral IV Present     Imaging order in this encounter Present     Respiratory Rate 20     Skin Assessment WDL     Diastolic BP 56     Magnesium not on file     Drug Use No     Tobacco Use Current     Hermes Scale not on file     Number of Distinct Medication Classes administered 2     Peripheral Vascular Assessment WDL     Calcium not on file     Financial Class Medicaid     Gastrointestinal Assessment WDL     Albumin not on file     Cardiac Assessment WDL     Total Bilirubin not on file     Chloride not on file     Potassium not on file     Days after Admission 0.041     Creatinine not on file     ALT not on file         Weight:       05/25/23  0907   Weight: 62.6 kg (138 lb)       Intake and Output    Intake/Output Summary  (Last 24 hours) at 5/25/2023 1318  Last data filed at 5/25/2023 1105  Gross per 24 hour   Intake 1000 ml   Output --   Net 1000 ml       Diet:   Dietary Orders (From admission, onward)       Start     Ordered    05/25/23 1237  Diet: Regular/House Diet; Texture: Regular Texture (IDDSI 7); Fluid Consistency: Thin (IDDSI 0)  Diet Effective Now        References:    Diet Order [place]   Question Answer Comment   Diets: Regular/House Diet    Texture: Regular Texture (IDDSI 7)    Fluid Consistency: Thin (IDDSI 0)        05/25/23 1237                     Most recent vitals:   Vitals:    05/25/23 1245 05/25/23 1258 05/25/23 1302 05/25/23 1303   BP: 118/70 101/68     BP Location: Right arm      Patient Position: Sitting      Pulse: 93 90 87 90   Resp: 15 16 15 16   Temp:       TempSrc:       SpO2: 91% 91% 97% 95%   Weight:       Height:           Active LDAs/IV Access:   Lines, Drains & Airways       Active LDAs       Name Placement date Placement time Site Days    Peripheral IV 05/25/23 0952 Anterior;Left;Medial Forearm 05/25/23  0952  Forearm  less than 1                    Skin Condition:   Skin Assessments (last day)       None             Labs (abnormal labs have a star):   Labs Reviewed   RESPIRATORY PANEL PCR W/ COVID-19 (SARS-COV-2) ANTONIO/KOBE/SCHUYLER/[facility] IN-HOUSE, NP SWAB IN UTM/VTP, 3-4 HR TAT - Abnormal; Notable for the following components:       Result Value    Human Metapneumovirus Detected (*)     All other components within normal limits    Narrative:     In the setting of a positive respiratory panel with a viral infection PLUS a negative procalcitonin without other underlying concern for bacterial infection, consider observing off antibiotics or discontinuation of antibiotics and continue supportive care. If the respiratory panel is positive for atypical bacterial infection (Bordetella pertussis, Chlamydophila pneumoniae, or Mycoplasma pneumoniae), consider antibiotic de-escalation to target atypical  bacterial infection.   COMPREHENSIVE METABOLIC PANEL - Abnormal; Notable for the following components:    Glucose 112 (*)     Sodium 133 (*)     Chloride 92 (*)     All other components within normal limits    Narrative:     GFR Normal >60  Chronic Kidney Disease <60  Kidney Failure <15     CBC WITH AUTO DIFFERENTIAL - Abnormal; Notable for the following components:    Platelets 136 (*)     Neutrophil % 79.8 (*)     Lymphocyte % 12.7 (*)     Eosinophil % 0.0 (*)     Neutrophils, Absolute 7.90 (*)     All other components within normal limits   BLOOD GAS, ARTERIAL - Abnormal; Notable for the following components:    HCO3, Arterial 29.8 (*)     Base Excess, Arterial 4.2 (*)     CO2 Content 31.2 (*)     All other components within normal limits   LIPID PANEL - Abnormal; Notable for the following components:    HDL Cholesterol 64 (*)     All other components within normal limits    Narrative:     Cholesterol Reference Ranges  (U.S. Department of Health and Human Services ATP III Classifications)    Desirable          <200 mg/dL  Borderline High    200-239 mg/dL  High Risk          >240 mg/dL      Triglyceride Reference Ranges  (U.S. Department of Health and Human Services ATP III Classifications)    Normal           <150 mg/dL  Borderline High  150-199 mg/dL  High             200-499 mg/dL  Very High        >500 mg/dL    HDL Reference Ranges  (U.S. Department of Health and Human Services ATP III Classifications)    Low     <40 mg/dl (major risk factor for CHD)  High    >60 mg/dl ('negative' risk factor for CHD)        LDL Reference Ranges  (U.S. Department of Health and Human Services ATP III Classifications)    Optimal          <100 mg/dL  Near Optimal     100-129 mg/dL  Borderline High  130-159 mg/dL  High             160-189 mg/dL  Very High        >189 mg/dL   TSH - Abnormal; Notable for the following components:    TSH 0.229 (*)     All other components within normal limits   BNP (IN-HOUSE) - Normal    Narrative:   "   Among patients with dyspnea, NT-proBNP is highly sensitive for the detection of acute congestive heart failure. In addition NT-proBNP of <300 pg/ml effectively rules out acute congestive heart failure with 99% negative predictive value.     SINGLE HSTROPONIN T - Normal    Narrative:     High Sensitive Troponin T Reference Range:  <10.0 ng/L- Negative Female for AMI  <15.0 ng/L- Negative Male for AMI  >=10 - Abnormal Female indicating possible myocardial injury.  >=15 - Abnormal Male indicating possible myocardial injury.   Clinicians would have to utilize clinical acumen, EKG, Troponin, and serial changes to determine if it is an Acute Myocardial Infarction or myocardial injury due to an underlying chronic condition.        D-DIMER, QUANTITATIVE - Normal    Narrative:     According to the assay 's published package insert, a normal (<0.50 mg/L (FEU)) D-dimer result in conjunction with a non-high clinical probability assessment, excludes deep vein thrombosis (DVT) and pulmonary embolism (PE) with high sensitivity.    D-dimer values increase with age and this can make VTE exclusion of an older population difficult. To address this, the American College of Physicians, based on best available evidence and recent guidelines, recommends that clinicians use age-adjusted D-dimer thresholds in patients greater than 50 years of age with: a) a low probability of PE who do not meet all Pulmonary Embolism Rule Out Criteria, or b) in those with intermediate probability of PE.   The formula for an age-adjusted D-dimer cut-off is \"age/100\".  For example, a 60 year old patient would have an age-adjusted cut-off of 0.60 mg/L (FEU) and an 80 year old 0.80 mg/L (FEU).   HEMOGLOBIN A1C - Normal   POC LACTATE - Normal   BLOOD CULTURE   BLOOD CULTURE   RESPIRATORY CULTURE   RAINBOW DRAW    Narrative:     The following orders were created for panel order [place] Draw.  Procedure                               Abnormality      "    Status                     ---------                               -----------         ------                     Green Top (Gel)[[id number]]                                                             Lavender Top[[id number]]                                     Final result               Gold Top - SST[[id number]]                                   Final result               Light Blue Top[[id number]]                                   Final result                 Please view results for these tests on the individual orders.   BLOOD GAS, ARTERIAL   POC LACTATE   CBC AND DIFFERENTIAL    Narrative:     The following orders were created for panel order CBC & Differential.  Procedure                               Abnormality         Status                     ---------                               -----------         ------                     CBC Auto Differential[[id number]]        Abnormal            Final result                 Please view results for these tests on the individual orders.   LAVENDER TOP   GOLD TOP - SST   LIGHT BLUE TOP       LOC: Person, Place, Time, and Situation    Telemetry:  Telemetry    Cardiac Monitoring Ordered: yes    EKG:   ECG 12 Lead Dyspnea   Preliminary Result   HEART RATE= 99  bpm   RR Interval= 608  ms   NV Interval= 152  ms   P Horizontal Axis= -15  deg   P Front Axis= 72  deg   QRSD Interval= 81  ms   QT Interval= 350  ms   QRS Axis= 27  deg   T Wave Axis= 84  deg   - BORDERLINE ECG -   Sinus rhythm   Biatrial enlargement   No previous ECG available for comparison   Electronically Signed By:    Date and Time of Study: 2023-05-25 09:21:17      ECG 12 Lead    (Results Pending)       Medications Given in the ED:   Medications   sodium chloride 0.9 % flush 10 mL (has no administration in time range)   sodium chloride 0.9 % flush 10 mL (has no administration in time range)   sodium chloride 0.9 % flush 10 mL (has no administration in time range)   sodium chloride 0.9 % infusion 40 mL (has  no administration in time range)   Pharmacy to Dose enoxaparin (LOVENOX) (has no administration in time range)   nitroglycerin (NITROSTAT) SL tablet 0.4 mg (has no administration in time range)   acetaminophen (TYLENOL) tablet 650 mg (has no administration in time range)   ipratropium-albuterol (DUO-NEB) nebulizer solution 3 mL (has no administration in time range)   budesonide (PULMICORT) nebulizer solution 0.5 mg (0.5 mg Nebulization Given 5/25/23 1303)   Enoxaparin Sodium (LOVENOX) syringe 40 mg (has no administration in time range)   predniSONE (DELTASONE) tablet 30 mg (has no administration in time range)     Followed by   predniSONE (DELTASONE) tablet 20 mg (has no administration in time range)     Followed by   predniSONE (DELTASONE) tablet 10 mg (has no administration in time range)   sodium chloride 0.9 % bolus 1,000 mL (0 mL Intravenous Stopped 5/25/23 1105)   methylPREDNISolone sodium succinate (SOLU-Medrol) injection 125 mg (125 mg Intravenous Given 5/25/23 0529)       Imaging results:  XR Chest 1 View    Result Date: 5/25/2023  Impression: 1.An acute pulmonary process is not apparent. Electronically Signed: Soto Tello  5/25/2023 9:59 AM EDT  Workstation ID: [id number]     Social issues:   Social History     Socioeconomic History    Marital status: Single   Tobacco Use    Smoking status: Every Day     Packs/day: 0.50     Types: Cigarettes     Start date: 1982    Smokeless tobacco: Never   Vaping Use    Vaping Use: Never used   Substance and Sexual Activity    Alcohol use: Yes     Comment: SOCIAL    Drug use: Not Currently    Sexual activity: Defer       NIH Stroke Scale:  Interval: (not recorded)  1a. Level of Consciousness: (not recorded)  1b. LOC Questions: (not recorded)  1c. LOC Commands: (not recorded)  2. Best Gaze: (not recorded)  3. Visual: (not recorded)  4. Facial Palsy: (not recorded)  5a. Motor Arm, Left: (not recorded)  5b. Motor Arm, Right: (not recorded)  6a. Motor Leg, Left: (not  recorded)  6b. Motor Leg, Right: (not recorded)  7. Limb Ataxia: (not recorded)  8. Sensory: (not recorded)  9. Best Language: (not recorded)  10. Dysarthria: (not recorded)  11. Extinction and Inattention (formerly Neglect): (not recorded)    Total (NIH Stroke Scale): (not recorded)     Additional notable assessment information:     Nursing report ED to floor:      Rose Farah RN   05/25/23 13:18 EDT

## 2023-05-25 NOTE — ED NOTES
Patient states that she has been intermittently sick for some time now. She states that as soon as she starts to feel better it comes right back. Complaints of cough, SOA, dizziness and fever.   Pt also states that she works at a  and has been around several sick kids lately. Patient oxygen was found low in triage and has been placed on nasal cannula 3L

## 2023-05-26 PROBLEM — J40 BRONCHITIS DUE TO HUMAN METAPNEUMOVIRUS (HMPV): Status: ACTIVE | Noted: 2023-05-26

## 2023-05-26 PROBLEM — B97.81 BRONCHITIS DUE TO HUMAN METAPNEUMOVIRUS (HMPV): Status: ACTIVE | Noted: 2023-05-26

## 2023-05-26 LAB
ANION GAP SERPL CALCULATED.3IONS-SCNC: 8 MMOL/L (ref 5–15)
BASOPHILS # BLD AUTO: 0 10*3/MM3 (ref 0–0.2)
BASOPHILS NFR BLD AUTO: 0.1 % (ref 0–1.5)
BUN SERPL-MCNC: 13 MG/DL (ref 8–23)
BUN/CREAT SERPL: 25 (ref 7–25)
CALCIUM SPEC-SCNC: 9.1 MG/DL (ref 8.6–10.5)
CHLORIDE SERPL-SCNC: 100 MMOL/L (ref 98–107)
CO2 SERPL-SCNC: 30 MMOL/L (ref 22–29)
CREAT SERPL-MCNC: 0.52 MG/DL (ref 0.57–1)
DEPRECATED RDW RBC AUTO: 44.6 FL (ref 37–54)
EGFRCR SERPLBLD CKD-EPI 2021: 106.5 ML/MIN/1.73
EOSINOPHIL # BLD AUTO: 0 10*3/MM3 (ref 0–0.4)
EOSINOPHIL NFR BLD AUTO: 0 % (ref 0.3–6.2)
ERYTHROCYTE [DISTWIDTH] IN BLOOD BY AUTOMATED COUNT: 13.7 % (ref 12.3–15.4)
GLUCOSE SERPL-MCNC: 129 MG/DL (ref 65–99)
HCT VFR BLD AUTO: 39.3 % (ref 34–46.6)
HGB BLD-MCNC: 12.6 G/DL (ref 12–15.9)
LYMPHOCYTES # BLD AUTO: 1.4 10*3/MM3 (ref 0.7–3.1)
LYMPHOCYTES NFR BLD AUTO: 12.2 % (ref 19.6–45.3)
MAGNESIUM SERPL-MCNC: 1.9 MG/DL (ref 1.6–2.4)
MCH RBC QN AUTO: 29.9 PG (ref 26.6–33)
MCHC RBC AUTO-ENTMCNC: 32 G/DL (ref 31.5–35.7)
MCV RBC AUTO: 93.4 FL (ref 79–97)
MONOCYTES # BLD AUTO: 0.8 10*3/MM3 (ref 0.1–0.9)
MONOCYTES NFR BLD AUTO: 7.3 % (ref 5–12)
NEUTROPHILS NFR BLD AUTO: 80.4 % (ref 42.7–76)
NEUTROPHILS NFR BLD AUTO: 9 10*3/MM3 (ref 1.7–7)
NRBC BLD AUTO-RTO: 0.1 /100 WBC (ref 0–0.2)
PLATELET # BLD AUTO: 132 10*3/MM3 (ref 140–450)
PMV BLD AUTO: 10.7 FL (ref 6–12)
POTASSIUM SERPL-SCNC: 4 MMOL/L (ref 3.5–5.2)
QT INTERVAL: 350 MS
RBC # BLD AUTO: 4.21 10*6/MM3 (ref 3.77–5.28)
SODIUM SERPL-SCNC: 138 MMOL/L (ref 136–145)
WBC NRBC COR # BLD: 11.1 10*3/MM3 (ref 3.4–10.8)

## 2023-05-26 PROCEDURE — 63710000001 PREDNISONE PER 1 MG: Performed by: NURSE PRACTITIONER

## 2023-05-26 PROCEDURE — 94799 UNLISTED PULMONARY SVC/PX: CPT

## 2023-05-26 PROCEDURE — 87205 SMEAR GRAM STAIN: CPT | Performed by: INTERNAL MEDICINE

## 2023-05-26 PROCEDURE — 80048 BASIC METABOLIC PNL TOTAL CA: CPT | Performed by: NURSE PRACTITIONER

## 2023-05-26 PROCEDURE — 83735 ASSAY OF MAGNESIUM: CPT | Performed by: STUDENT IN AN ORGANIZED HEALTH CARE EDUCATION/TRAINING PROGRAM

## 2023-05-26 PROCEDURE — 25010000002 ENOXAPARIN PER 10 MG: Performed by: NURSE PRACTITIONER

## 2023-05-26 PROCEDURE — 36415 COLL VENOUS BLD VENIPUNCTURE: CPT | Performed by: NURSE PRACTITIONER

## 2023-05-26 PROCEDURE — 63710000001 PREDNISONE PER 5 MG: Performed by: NURSE PRACTITIONER

## 2023-05-26 PROCEDURE — 85025 COMPLETE CBC W/AUTO DIFF WBC: CPT | Performed by: NURSE PRACTITIONER

## 2023-05-26 RX ADMIN — HYDROCHLOROTHIAZIDE 25 MG: 25 TABLET ORAL at 08:45

## 2023-05-26 RX ADMIN — LEVOTHYROXINE SODIUM 88 MCG: 0.09 TABLET ORAL at 06:05

## 2023-05-26 RX ADMIN — NICOTINE 1 PATCH: 21 PATCH, EXTENDED RELEASE TRANSDERMAL at 08:46

## 2023-05-26 RX ADMIN — IPRATROPIUM BROMIDE AND ALBUTEROL SULFATE 3 ML: .5; 3 SOLUTION RESPIRATORY (INHALATION) at 15:01

## 2023-05-26 RX ADMIN — IPRATROPIUM BROMIDE AND ALBUTEROL SULFATE 3 ML: .5; 3 SOLUTION RESPIRATORY (INHALATION) at 19:25

## 2023-05-26 RX ADMIN — ENOXAPARIN SODIUM 40 MG: 100 INJECTION SUBCUTANEOUS at 15:32

## 2023-05-26 RX ADMIN — PREDNISONE 30 MG: 20 TABLET ORAL at 08:45

## 2023-05-26 RX ADMIN — IPRATROPIUM BROMIDE AND ALBUTEROL SULFATE 3 ML: .5; 3 SOLUTION RESPIRATORY (INHALATION) at 11:01

## 2023-05-26 RX ADMIN — Medication 10 ML: at 21:07

## 2023-05-26 RX ADMIN — LISINOPRIL 20 MG: 20 TABLET ORAL at 08:45

## 2023-05-26 RX ADMIN — Medication 10 ML: at 08:45

## 2023-05-26 RX ADMIN — BUDESONIDE 0.5 MG: 0.5 INHALANT RESPIRATORY (INHALATION) at 19:31

## 2023-05-26 RX ADMIN — PSYLLIUM HUSK 1 PACKET: 3.4 POWDER ORAL at 21:06

## 2023-05-26 RX ADMIN — AMLODIPINE BESYLATE 5 MG: 5 TABLET ORAL at 08:45

## 2023-05-26 NOTE — CASE MANAGEMENT/SOCIAL WORK
Social Work Assessment  [facility]     Patient Name: Kirby Gunter  MRN: [id number]  Today's Date: 5/26/2023    Admit Date: 5/25/2023     Discharge Needs Assessment     Row Name 05/26/23 1121       Living Environment    People in Home alone    Potentially Unsafe Housing Conditions none    Primary Care Provided by self    Able to Return to Prior Arrangements yes       Transportation Needs    In the past 12 months, has lack of transportation kept you from medical appointments or from getting medications? no    In the past 12 months, has lack of transportation kept you from meetings, work, or from getting things needed for daily living? No       Food Insecurity    Within the past 12 months, you worried that your food would run out before you got the money to buy more. Never true    Within the past 12 months, the food you bought just didn't last and you didn't have money to get more. Never true       Transition Planning    Patient/Family Anticipates Transition to home    Transportation Anticipated car, drives self       Discharge Needs Assessment    Readmission Within the Last 30 Days no previous admission in last 30 days    Equipment Currently Used at Home none    Concerns to be Addressed no discharge needs identified               Discharge Plan     Row Name 05/26/23 1122       Plan    Plan DC plan: Pt plans to return home alone.  Pt can drive self.    Plan Comments SW met with pt to complete SDOH.  Pt reported no social concerns.  SW asked CM questions.  Pt confirmed her PCP and pharmacy on file are both correct.  Pt denied the use of any DME at home. Pt plans to return home alone.              Continued Care and Services - Admitted Since 5/25/2023    Coordination has not been started for this encounter.          Demographic Summary     Row Name 05/26/23 1120       General Information    Admission Type inpatient    Arrived From emergency department    Referral Source admission list    Reason for Consult discharge  planning    Preferred Language English       Contact Information    Permission Granted to Share Info With                Functional Status     Row Name 05/26/23 1120       Functional Status    Usual Activity Tolerance moderate    Current Activity Tolerance moderate       Physical Activity    On average, how many days per week do you engage in moderate to strenuous exercise (like a brisk walk)? 0 days    On average, how many minutes do you engage in exercise at this level? 0 min    Number of minutes of exercise per week 0       Functional Status, IADL    Medications independent    Meal Preparation independent    Housekeeping independent    Laundry independent    Shopping independent       Mental Status    General Appearance WDL WDL       Mental Status Summary    Recent Changes in Mental Status/Cognitive Functioning no changes       Employment/    Employment Status employed full-time                     05/26/23 1117   Living Situation   Current Living Arrangements apartment   Potentially Unsafe Housing Conditions none   Food Insecurity   Within the past 12 months, you worried that your food would run out before you got the money to buy more. Never true   Within the past 12 months, the food you bought just didn't last and you didn't have money to get more. Never true   Transportation Needs   In the past 12 months, has lack of transportation kept you from medical appointments or from getting medications? no   In the past 12 months, has lack of transportation kept you from meetings, work, or from getting things needed for daily living? No   Utilities   In the past 12 months has the electric, gas, oil, or water company threatened to shut off services in your home? No   Abuse Screen (yes response referral indicated)   Feels Unsafe at Home or Work/School no   Feels Threatened by Someone no   Does Anyone Try to Keep You From Having Contact with Others or Doing Things Outside Your Home? no   Physical  Signs of Abuse Present no   Financial Resource Strain   How hard is it for you to pay for the very basics like food, housing, medical care, and heating? Not hard   Employment   Do you want help finding or keeping work or a job? I do not need or want help   Family and Community Support   If for any reason you need help with day-to-day activities such as bathing, preparing meals, shopping, managing finances, etc., do you get the help you need? I don't need any help   How often do you feel lonely or isolated from those around you? Never   Education   Preferred Language English   Do you want help with school or training? For example, starting or completing job training or getting a high school diploma, GED or equivalent No   Physical Activity   On average, how many days per week do you engage in moderate to strenuous exercise (like a brisk walk)? 0 days   On average, how many minutes do you engage in exercise at this level? 0 min   Number of minutes of exercise per week (!) 0   Alcohol Use   Q1: How often do you have a drink containing alcohol? 2-3 per wk   Q2: How many drinks containing alcohol do you have on a typical day when you are drinking? 1 or 2   Q3: How often do you have six or more drinks on one occasion? Weekly   Mental Health   Little Interest or Pleasure in Doing Things 0-->not at all   Feeling Down, Depressed or Hopeless 0-->not at all   Disabilities   Concentrating, Remembering or Making Decisions Difficulty no   Doing Errands Independently Difficulty (such as shopping) no     Shayy Castellon LCSW    Office: [phone]  Fax: [phone]  [email or web address]

## 2023-05-26 NOTE — PLAN OF CARE
Goal Outcome Evaluation:  Plan of Care Reviewed With: patient        Progress: improving  Outcome Evaluation: Patient tolerating room air at this time. Patient congested with nonproductive cough. No complaints at this time. Patient likely discharging tomorrow morning.

## 2023-05-26 NOTE — PAYOR COMM NOTE
"AUTHORIZATION PENDING:   PLEASE CALL OR FAX DETERMINATION TO CONTACT BELOW. THANK YOU.        Aundrea Erazo RN MSN  /UR  [facility]  [phone] office  [phone] fax  [email or web address]    [facility]  NPI: [id number]  Tax: [id number]              Kirby Simmons (58 y.o. Female)     Date of Birth   1965    Social Security Number       Address   [address] [place] [address]    Home Phone       MRN   [id number]       Jehovah's witness   Patient Refused    Marital Status   Single                            Admission Date   5/25/23    Admission Type   Emergency    Admitting Provider   Jonny Reyes MD    Attending Provider   Jonny Reyes MD    Department, Room/Bed   [facility] 2A PEDIATRICS, 201/1       Discharge Date       Discharge Disposition       Discharge Destination                               Attending Provider: Jonny Reyes MD    Allergies: No Known Allergies    Isolation: Contact Drop   Infection: Human Metapneumovirus  (05/25/23)   Code Status: CPR    Ht: 157.5 cm (62\")   Wt: 62.5 kg (137 lb 12.8 oz)    Admission Cmt: None   Principal Problem: Hypoxia [R09.02]                 Active Insurance as of 5/25/2023     Primary Coverage     Payor Plan Insurance Group Employer/Plan Group    [id number]     Payor Plan Address Payor Plan Phone Number Payor Plan Fax Number Effective Dates    MAIL STOP:   4/1/2017 - None Entered    [address] [address]       [facility] [address]       Subscriber Name Subscriber Birth Date Member ID       KIRBY SIMMONS 1965 [id number]                 Emergency Contacts      (Rel.) Home Phone Work Phone Mobile Phone    MARTÍN RANDHAWA (Significant Other) [phone] -- --    ARCELIA CALVERT (Sister) -- -- --        05/26/23 0926  Inpatient Admission  Once     Completed     Level of Care: Telemetry    Diagnosis: Bronchitis due to human " metapneumovirus (hMPV) [2676477]    Admitting Physician: JONNY REYES [[id number]]    Attending Physician: JONNY REYES [[id number]]    Certification: I Certify That Inpatient Hospital Services Are Medically Necessary For Greater Than 2 Midnights        23 0931         23 1234  Initiate Observation Status  Once     Completed     Level of Care: Telemetry    Diagnosis: Hypoxia [440981]    Admitting Physician: JONNY REYES [[id number]]    Attending Physician: JONNY REYES [[id number]]                    History & Physical      Lili Andersen APRN at 23 1300     Attestation signed by Jonny Reyes MD at 23 0711    I have reviewed this documentation and agree.                      [facility] Medicine Services  History & Physical    Patient Name: Kirby Gunter  : 1965  MRN: [id number]  Primary Care Physician:  Chava López MD  Date of admission: 2023  Date and Time of Service: 2023 at 13:02 EDT    Subjective       Chief Complaint: Hypoxia    History of Present Illness: Kirby Gunter is a 58 y.o. female who presented to [facility] on 2023 from Dr. López's office, due to low oxygen level. She reports having respiratory illnesses off and on since March. She improves for short periods and then becomes ill again. She reports yellow mucous production, denies SOB, and is a current smoker. She reports she had been living at her boyfriends home for a year up until the end of March this year. She reports she saw mold at her boyfriend's home. 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     XR Chest 1 View    Result Date: 2023  Impression: 1.An acute pulmonary process is not apparent. Electronically Signed: Soto Tello  2023 9:59 AM EDT  Workstation ID: [id number]    ECG 12 Lead Dyspnea   Preliminary Result   HEART RATE= 99  bpm   RR Interval= 608  ms   IA Interval= 152  ms   P Horizontal Axis= -15   deg   P Front Axis= 72  deg   QRSD Interval= 81  ms   QT Interval= 350  ms   QRS Axis= 27  deg   T Wave Axis= 84  deg   - BORDERLINE ECG -   Sinus rhythm   Biatrial enlargement   No previous ECG available for comparison   Electronically Signed By:    Date and Time of Study: 2023-05-25 09:21:17      ECG 12 Lead    (Results Pending)           Review of Systems   Constitutional: Positive for fever.   Respiratory: Positive for cough, shortness of breath, sputum production and wheezing.    All other systems reviewed and are negative.       Personal History     Past Medical History:   Diagnosis Date   • Acute URI    • Hypertension    • Hypothyroidism    • Overweight        No past surgical history on file.    Family History: family history includes Aneurysm in her brother and mother; Arthritis in her mother; Hypertension in her mother and sister. Otherwise pertinent FHx was reviewed and not pertinent to current issue.    Social History:  reports that she has been smoking cigarettes. She started smoking about 41 years ago. She has been smoking an average of .5 packs per day. She has never used smokeless tobacco. She reports current alcohol use. She reports that she does not currently use drugs.    Home Medications:  Prior to Admission Medications     Prescriptions Last Dose Informant Patient Reported? Taking?    acyclovir (Zovirax) 5 % ointment   No No    Apply  topically to the appropriate area as directed 4 (Four) Times a Day.    albuterol sulfate  (90 Base) MCG/ACT inhaler   No No    Inhale 2 puffs Every 4 (Four) Hours As Needed for Wheezing or Shortness of Air.    amLODIPine (NORVASC) 5 MG tablet   No No    Take 1 tablet by mouth Daily.    Chlorcyclizine-Pseudoephed (Stahist AD) 25-60 MG tablet   No No    Take 0.5 tablets by mouth 2 (Two) Times a Day As Needed (sinus pressure, congestion).    guaiFENesin (Mucinex) 600 MG 12 hr tablet   No No    Take 2 tablets by mouth 2 (Two) Times a Day.    levothyroxine  (SYNTHROID, LEVOTHROID) 88 MCG tablet   No No    TAKE ONE TABLET BY MOUTH DAILY    lisinopril-hydrochlorothiazide (PRINZIDE,ZESTORETIC) 20-25 MG per tablet   No No    TAKE ONE TABLET BY MOUTH DAILY            Allergies:  No Known Allergies    Objective       Vitals:   Temp:  [98.4 °F (36.9 °C)-98.6 °F (37 °C)] 98.6 °F (37 °C)  Heart Rate:  [] 93  Resp:  [14-20] 15  BP: ()/(55-70) 118/70  Flow (L/min):  [3] 3    Physical Exam  Vitals reviewed.   Constitutional:       Appearance: She is ill-appearing.   HENT:      Head: Normocephalic.   Eyes:      Pupils: Pupils are equal, round, and reactive to light.   Cardiovascular:      Rate and Rhythm: Normal rate and regular rhythm.      Pulses: Normal pulses.      Heart sounds: Normal heart sounds.   Pulmonary:      Effort: Pulmonary effort is normal.      Comments: Sightly diminished bases bilaterally. No wheezing.  Abdominal:      General: Abdomen is flat.      Palpations: Abdomen is soft.   Musculoskeletal:         General: Normal range of motion.   Skin:     General: Skin is warm and dry.   Neurological:      Mental Status: She is alert and oriented to person, place, and time.          Result Review    Result Review:  I have personally reviewed the results from the time of this admission to 5/25/2023 13:00 EDT and agree with these findings:  [x]  Laboratory  [x]  Microbiology  [x]  Radiology  [x]  EKG/Telemetry   []  Cardiology/Vascular   []  Pathology  []  Old records  []  Other:      Assessment & Plan        Active Hospital Problems:  Active Hospital Problems    Diagnosis    • **Hypoxia      Plan:     Hypoxia  -CXR negative, ABG unremarkable, D-dimer 0.35, lactic 0.7, WBCs 9.90, BNP 97  -Respiratory panel positive for human metapneumovirus  -Currently requiring 2 L NC, continue to wean and maintain O2 sat > 90%  -6 day taper Prednisone  -DuoNebs and Pulmicort  -Incentive spirometry  -CT chest without  -Sputum culture  -Monitor a.m. labs  -may need to consider  pulmonary consult for possible bronchoscopy due to recurrent respiratory illness    Current smoker  -Smoking cessation education  -NicoDerm    Hypertension  -/68  -Resume lisinopril-hydrochlorothiazide and amlodipine  -Hold if SBP <110    Hypothyroidism  -TSH 0.229  -Resume levothyroxine     DVT prophylaxis:  Medical DVT prophylaxis orders are present.    CODE STATUS:    Level Of Support Discussed With: Patient  Code Status (Patient has no pulse and is not breathing): CPR (Attempt to Resuscitate)  Medical Interventions (Patient has pulse or is breathing): Full Support    Admission Status:  I believe this patient meets observation status.    I discussed the patient's findings and my recommendations with patient and family.      Signature: Electronically signed by JAIRO Mcfarlane, 05/25/23, 13:00 EDT.  [facility] Hospitalist Team    Electronically signed by Jonny Reyes MD at 05/26/23 0711          Emergency Department Notes      Franci Wilson at 05/25/23 0915        EKG requested       Electronically signed by Franci Wilson at 05/25/23 0915     Yris Vazquez PA at 05/25/23 0932      Procedure Orders    1. ECG 12 Lead [[id number]] ordered by Yris Vazquez PA               Subjective   History of Present Illness  Chief Complaint: Shortness of air, upper respiratory symptoms for 2 weeks    Patient is a 58-year-old  female with ahistory of hypertension who complains of prolonged upper respiratory symptoms, and shortness of air.  Patient presented to her primary care provider this morning for prolonged upper respiratory symptoms where they measured her oxygen saturation on room air to be 82%, they advised her to proceed to the ED. Patient reports multiple trials of antibiotics and steroids, every time she finishes these medications her symptoms come back.  Patient reports congestion, cough, fevers.  Patient reports intermittently coughing up sputum.  Patient reports  over-the-counter Mucinex helps, and activity worsens her shortness of air.  Patient admits to running a fever of 101 degrees at home, and that she took some Aleve this morning at 7:30 AM to treat her fever.  Patient admits to body aches and a recent change in appetite.  Patient denies any pain with deep inspiration, nausea, vomiting, diarrhea, dysuria.  Patient denies recent travel lower extremity edema.     PCP: Chava López    History provided by:  Patient      Review of Systems   Constitutional: Positive for appetite change and fever.   HENT: Positive for congestion and rhinorrhea. Negative for sore throat and trouble swallowing.    Eyes: Negative.    Respiratory: Positive for cough and shortness of breath. Negative for wheezing.    Cardiovascular: Negative for chest pain and leg swelling.   Gastrointestinal: Negative for abdominal pain, diarrhea, nausea and vomiting.   Endocrine: Negative.    Genitourinary: Negative for dysuria.   Musculoskeletal: Negative for myalgias.   Skin: Negative for rash.   Allergic/Immunologic: Negative.    Neurological: Negative for headaches.   Psychiatric/Behavioral: Negative for behavioral problems.   All other systems reviewed and are negative.      Past Medical History:   Diagnosis Date   • Acute URI    • Hypertension    • Hypothyroidism    • Overweight        No Known Allergies    History reviewed. No pertinent surgical history.    Family History   Problem Relation Age of Onset   • Hypertension Mother    • Aneurysm Mother    • Arthritis Mother    • Hypertension Sister    • Aneurysm Brother        Social History     Socioeconomic History   • Marital status: Single   Tobacco Use   • Smoking status: Every Day     Packs/day: 0.50     Types: Cigarettes     Start date: 1982   Vaping Use   • Vaping Use: Never used   Substance and Sexual Activity   • Alcohol use: Yes     Comment: SOCIAL   • Drug use: Not Currently   • Sexual activity: Defer           Objective   Physical  Exam  Vitals and nursing note reviewed.   Constitutional:       General: She is not in acute distress.     Appearance: She is well-developed and normal weight. She is not diaphoretic.   HENT:      Head: Normocephalic and atraumatic.   Eyes:      Extraocular Movements: Extraocular movements intact.      Pupils: Pupils are equal, round, and reactive to light.   Cardiovascular:      Rate and Rhythm: Normal rate and regular rhythm.      Heart sounds: Normal heart sounds. No murmur heard.  Pulmonary:      Effort: Pulmonary effort is normal.      Breath sounds: Examination of the right-lower field reveals wheezing. Examination of the left-lower field reveals wheezing. Decreased breath sounds and wheezing present.      Comments: Crackles  Chest:      Chest wall: No tenderness or crepitus.   Abdominal:      General: Bowel sounds are normal.      Palpations: Abdomen is soft.   Musculoskeletal:         General: Normal range of motion.      Cervical back: Normal range of motion.      Right lower leg: No tenderness. No edema.      Left lower leg: No tenderness. No edema.   Skin:     General: Skin is warm.      Capillary Refill: Capillary refill takes less than 2 seconds.   Neurological:      General: No focal deficit present.      Mental Status: She is alert and oriented to person, place, and time.   Psychiatric:         Mood and Affect: Mood normal.         Behavior: Behavior normal.         ECG 12 Lead      Date/Time: 5/25/2023 1:02 PM  Performed by: Yris Vazquez PA  Authorized by: Jonny Reyes MD   Interpreted by physician  Previous ECG: no previous ECG available  Rhythm: sinus rhythm  Rate: normal  BPM: 99  QRS axis: normal  Conduction: conduction normal  Clinical impression: non-specific ECG                ED Course  ED Course as of 05/25/23 2037   Thu May 25, 2023   1225 Patient reevaluated, reports that she is feeling somewhat better.  Blood pressure improved.  Trialed patient on room air, oxygen dropped to 88  "to 89% on room air and she was placed back on 2 L per nasal cannula []   1232 I spoke with Lili NP who agreed to accept patient for admission for Dr. Mai  []      ED Course User Index  [] Yris Vazquez PA    /54 (BP Location: Right arm, Patient Position: Sitting)   Pulse 100   Temp 98 °F (36.7 °C) (Oral)   Resp 18   Ht 157.5 cm (62\")   Wt 62.5 kg (137 lb 12.8 oz)   SpO2 100%   BMI 25.20 kg/m²   Labs Reviewed   RESPIRATORY PANEL PCR W/ COVID-19 (SARS-COV-2) ANTONIO/KOBE/SCHUYLER/PAD/COR/[facility]AD/[facility] IN-HOUSE, NP SWAB IN [facility], 3-4 HR TAT - Abnormal; Notable for the following components:       Result Value    Human Metapneumovirus Detected (*)     All other components within normal limits    Narrative:     In the setting of a positive respiratory panel with a viral infection PLUS a negative procalcitonin without other underlying concern for bacterial infection, consider observing off antibiotics or discontinuation of antibiotics and continue supportive care. If the respiratory panel is positive for atypical bacterial infection (Bordetella pertussis, Chlamydophila pneumoniae, or Mycoplasma pneumoniae), consider antibiotic de-escalation to target atypical bacterial infection.   COMPREHENSIVE METABOLIC PANEL - Abnormal; Notable for the following components:    Glucose 112 (*)     Sodium 133 (*)     Chloride 92 (*)     All other components within normal limits    Narrative:     GFR Normal >60  Chronic Kidney Disease <60  Kidney Failure <15     CBC WITH AUTO DIFFERENTIAL - Abnormal; Notable for the following components:    Platelets 136 (*)     Neutrophil % 79.8 (*)     Lymphocyte % 12.7 (*)     Eosinophil % 0.0 (*)     Neutrophils, Absolute 7.90 (*)     All other components within normal limits   BLOOD GAS, ARTERIAL - Abnormal; Notable for the following components:    HCO3, Arterial 29.8 (*)     Base Excess, Arterial 4.2 (*)     CO2 Content 31.2 (*)     All other components within normal limits   LIPID " PANEL - Abnormal; Notable for the following components:    HDL Cholesterol 64 (*)     All other components within normal limits    Narrative:     Cholesterol Reference Ranges  (U.S. Department of Health and Human Services ATP III Classifications)    Desirable          <200 mg/dL  Borderline High    200-239 mg/dL  High Risk          >240 mg/dL      Triglyceride Reference Ranges  (U.S. Department of Health and Human Services ATP III Classifications)    Normal           <150 mg/dL  Borderline High  150-199 mg/dL  High             200-499 mg/dL  Very High        >500 mg/dL    HDL Reference Ranges  (U.S. Department of Health and Human Services ATP III Classifications)    Low     <40 mg/dl (major risk factor for CHD)  High    >60 mg/dl ('negative' risk factor for CHD)        LDL Reference Ranges  (U.S. Department of Health and Human Services ATP III Classifications)    Optimal          <100 mg/dL  Near Optimal     100-129 mg/dL  Borderline High  130-159 mg/dL  High             160-189 mg/dL  Very High        >189 mg/dL   TSH - Abnormal; Notable for the following components:    TSH 0.229 (*)     All other components within normal limits   BNP (IN-HOUSE) - Normal    Narrative:     Among patients with dyspnea, NT-proBNP is highly sensitive for the detection of acute congestive heart failure. In addition NT-proBNP of <300 pg/ml effectively rules out acute congestive heart failure with 99% negative predictive value.     SINGLE HSTROPONIN T - Normal    Narrative:     High Sensitive Troponin T Reference Range:  <10.0 ng/L- Negative Female for AMI  <15.0 ng/L- Negative Male for AMI  >=10 - Abnormal Female indicating possible myocardial injury.  >=15 - Abnormal Male indicating possible myocardial injury.   Clinicians would have to utilize clinical acumen, EKG, Troponin, and serial changes to determine if it is an Acute Myocardial Infarction or myocardial injury due to an underlying chronic condition.        D-DIMER, QUANTITATIVE -  "Normal    Narrative:     According to the assay 's published package insert, a normal (<0.50 mg/L (FEU)) D-dimer result in conjunction with a non-high clinical probability assessment, excludes deep vein thrombosis (DVT) and pulmonary embolism (PE) with high sensitivity.    D-dimer values increase with age and this can make VTE exclusion of an older population difficult. To address this, the American College of Physicians, based on best available evidence and recent guidelines, recommends that clinicians use age-adjusted D-dimer thresholds in patients greater than 50 years of age with: a) a low probability of PE who do not meet all Pulmonary Embolism Rule Out Criteria, or b) in those with intermediate probability of PE.   The formula for an age-adjusted D-dimer cut-off is \"age/100\".  For example, a 60 year old patient would have an age-adjusted cut-off of 0.60 mg/L (FEU) and an 80 year old 0.80 mg/L (FEU).   HEMOGLOBIN A1C - Normal   POC LACTATE - Normal   BLOOD CULTURE   BLOOD CULTURE   RESPIRATORY CULTURE   RAINBOW DRAW    Narrative:     The following orders were created for panel order [place] Draw.  Procedure                               Abnormality         Status                     ---------                               -----------         ------                     Green Top (Gel)[[id number]]                                                             Lavender Top[[id number]]                                     Final result               Gold Top - SST[[id number]]                                   Final result               Light Blue Top[[id number]]                                   Final result                 Please view results for these tests on the individual orders.   BLOOD GAS, ARTERIAL   POC LACTATE   CBC AND DIFFERENTIAL    Narrative:     The following orders were created for panel order CBC & Differential.  Procedure                               Abnormality         Status                  "    ---------                               -----------         ------                     CBC Auto Differential[[id number]]        Abnormal            Final result                 Please view results for these tests on the individual orders.   LAVENDER TOP   GOLD TOP - SST   LIGHT BLUE TOP     Medications   sodium chloride 0.9 % flush 10 mL (has no administration in time range)   sodium chloride 0.9 % flush 10 mL (10 mL Intravenous Given 5/25/23 2018)   sodium chloride 0.9 % flush 10 mL (has no administration in time range)   sodium chloride 0.9 % infusion 40 mL (has no administration in time range)   Pharmacy to Dose enoxaparin (LOVENOX) (has no administration in time range)   nitroglycerin (NITROSTAT) SL tablet 0.4 mg (has no administration in time range)   acetaminophen (TYLENOL) tablet 650 mg (has no administration in time range)   ipratropium-albuterol (DUO-NEB) nebulizer solution 3 mL (3 mL Nebulization Given 5/25/23 2020)   budesonide (PULMICORT) nebulizer solution 0.5 mg (0.5 mg Nebulization Given 5/25/23 2015)   Enoxaparin Sodium (LOVENOX) syringe 40 mg (40 mg Subcutaneous Given 5/25/23 1551)   predniSONE (DELTASONE) tablet 30 mg (30 mg Oral Given 5/25/23 1552)     Followed by   predniSONE (DELTASONE) tablet 20 mg (has no administration in time range)     Followed by   predniSONE (DELTASONE) tablet 10 mg (10 mg Oral Not Given 5/25/23 1550)   nicotine (NICODERM CQ) 21 MG/24HR patch 1 patch (1 patch Transdermal Medication Applied 5/25/23 1551)   amLODIPine (NORVASC) tablet 5 mg (has no administration in time range)   lisinopril (PRINIVIL,ZESTRIL) tablet 20 mg (has no administration in time range)     And   hydroCHLOROthiazide (HYDRODIURIL) tablet 25 mg (has no administration in time range)   levothyroxine (SYNTHROID, LEVOTHROID) tablet 88 mcg (has no administration in time range)   sodium chloride 0.9 % bolus 1,000 mL (0 mL Intravenous Stopped 5/25/23 1105)   methylPREDNISolone sodium succinate (SOLU-Medrol)  injection 125 mg (125 mg Intravenous Given 5/25/23 0957)     CT Chest Without Contrast Diagnostic    Result Date: 5/25/2023  Scattered bilateral small nodular infiltrates which may represent developing pneumonia versus pneumonitis. No consolidation or pleural effusion. Electronically Signed: Nain Dodd  5/25/2023 4:52 PM EDT  Workstation ID: [id number]    XR Chest 1 View    Result Date: 5/25/2023  Impression: 1.An acute pulmonary process is not apparent. Electronically Signed: Soto Tello  5/25/2023 9:59 AM EDT  Workstation ID: [id number]                                           Medical Decision Making  Differential Dx (Includes but not limited to): COPD exacerbation, CHF exacerbation, pneumonia, PE, pleural effusion, viral syndrome, bronchitis  Medical Records Reviewed: Patient seen at primary care office today for follow-up and was advised to come to the ER for further evaluation for atypical pneumonia.  Labs: On my interpretation lactate 0.7, blood cultures pending.  ABG pH normal PaO2 83.  CBC no leukocytosis.  Respiratory panel positive for human metapneumovirus.  D-dimer 0.35.  Troponin 8  Imaging: Chest x-ray shows no obvious pneumonia or pleural effusion  Telemetry: EKG interpretation: Reviewed myself interpreted by ER attending, sinus rhythm rate of 99 with biatrial enlargement, no acute ST changes.  Testing considered but not ordered: CT head, patient denies headache or head injury  Nature of Complaint: Acute  Admission vs Discharge: Admit  Discussion: While in the ED IV was placed and labs were obtained appropriate PPE was worn during exam and throughout all encounters with the patient.  Patient had the above evaluation.  IV established, lab work obtained.  On arrival to the ER patient O2 saturation 82%.  She was placed on 2 L per nasal cannula.  Lab work reassuring, ABG shows normal pH with PaO2 of 83.  Normal troponin, D-dimer.  Respiratory panel positive for human metapneumovirus.  Patient was given  Solu-Medrol and DuoNeb treatment with some improvement in her wheezing.  Suspect some degree of COPD as well.  Chest x-ray unremarkable.  Patient continued to require supplemental oxygen which is new for her.  Patient will be admitted for further evaluation and treatment due to this.  I spoke with ADA Pearson who agreed to accept patient for admission for the hospitalist group.    Bronchitis due to human metapneumovirus (hMPV): complicated acute illness or injury  Dyspnea, unspecified type: complicated acute illness or injury  Hypoxia: complicated acute illness or injury  Amount and/or Complexity of Data Reviewed  External Data Reviewed: notes.  Labs: ordered. Decision-making details documented in ED Course.  Radiology: ordered. Decision-making details documented in ED Course.  ECG/medicine tests: ordered and independent interpretation performed. Decision-making details documented in ED Course.      Risk  Prescription drug management.  Decision regarding hospitalization.          Final diagnoses:   Bronchitis due to human metapneumovirus (hMPV)   Dyspnea, unspecified type   Hypoxia       ED Disposition  ED Disposition     ED Disposition   Decision to Admit    Condition   --    Comment   Level of Care: Telemetry [5]   Admitting Physician: JUSTIN CHIRINOS [[id number]]   Attending Physician: JUSTIN CHIRINOS [[id number]]               No follow-up provider specified.       Medication List      No changes were made to your prescriptions during this visit.          Yris Vazquez PA  05/25/23 2037      Electronically signed by Yris Vazquez PA at 05/25/23 2037     Rose Farah RN at 05/25/23 6486        Patient states that she has been intermittently sick for some time now. She states that as soon as she starts to feel better it comes right back. Complaints of cough, SOA, dizziness and fever.   Pt also states that she works at a  and has been around several sick kids lately. Patient oxygen was found low in triage  and has been placed on nasal cannula 3L    Electronically signed by Rose Farah, RN at 05/25/23 1008     Rose Farah, RN at 05/25/23 1317          Nursing report ED to floor  Kirby Gunter  58 y.o.  female    HPI:   Chief Complaint   Patient presents with   • Shortness of Breath     URI, soa, congestion, cough, states sent from PMD due to low oxygen.       Admitting doctor:   Jonny Reyes MD    Admitting diagnosis:   The primary encounter diagnosis was Bronchitis due to human metapneumovirus (hMPV). Diagnoses of Dyspnea, unspecified type and Hypoxia were also pertinent to this visit.    Code status:   Current Code Status       Date Active Code Status Order ID Comments User Context       5/25/2023 1237 CPR (Attempt to Resuscitate) [id number]  Lili Andersen APRN ED        Question Answer    Code Status (Patient has no pulse and is not breathing) CPR (Attempt to Resuscitate)    Medical Interventions (Patient has pulse or is breathing) Full Support    Level Of Support Discussed With Patient                    Allergies:   Patient has no known allergies.    Isolation:  Contact and Droplet     Fall Risk:  Fall Risk Assessment was completed, and patient is at low risk for falls.   Predictive Model Details         7 (Low) Factor Value    Calculated 5/25/2023 10:08 Age 58    Risk of Fall Model Musculoskeletal Assessment WDL     Active Peripheral IV Present     Imaging order in this encounter Present     Respiratory Rate 20     Skin Assessment WDL     Diastolic BP 56     Magnesium not on file     Drug Use No     Tobacco Use Current     Hermes Scale not on file     Number of Distinct Medication Classes administered 2     Peripheral Vascular Assessment WDL     Calcium not on file     Financial Class Medicaid     Gastrointestinal Assessment WDL     Albumin not on file     Cardiac Assessment WDL     Total Bilirubin not on file     Chloride not on file     Potassium not on file     Days after Admission 0.041      Creatinine not on file     ALT not on file         Weight:       05/25/23  0907   Weight: 62.6 kg (138 lb)       Intake and Output    Intake/Output Summary (Last 24 hours) at 5/25/2023 1318  Last data filed at 5/25/2023 1105  Gross per 24 hour   Intake 1000 ml   Output --   Net 1000 ml       Diet:   Dietary Orders (From admission, onward)       Start     Ordered    05/25/23 1237  Diet: Regular/House Diet; Texture: Regular Texture (IDDSI 7); Fluid Consistency: Thin (IDDSI 0)  Diet Effective Now        References:    Diet Order [facility]   Question Answer Comment   Diets: Regular/House Diet    Texture: Regular Texture (IDDSI 7)    Fluid Consistency: Thin (IDDSI 0)        05/25/23 1237                     Most recent vitals:   Vitals:    05/25/23 1245 05/25/23 1258 05/25/23 1302 05/25/23 1303   BP: 118/70 101/68     BP Location: Right arm      Patient Position: Sitting      Pulse: 93 90 87 90   Resp: 15 16 15 16   Temp:       TempSrc:       SpO2: 91% 91% 97% 95%   Weight:       Height:           Active LDAs/IV Access:   Lines, Drains & Airways       Active LDAs       Name Placement date Placement time Site Days    Peripheral IV 05/25/23 0952 Anterior;Left;Medial Forearm 05/25/23  0952  Forearm  less than 1                    Skin Condition:   Skin Assessments (last day)       None             Labs (abnormal labs have a star):   Labs Reviewed   RESPIRATORY PANEL PCR W/ COVID-19 (SARS-COV-2) [facility] IN-HOUSE, NP SWAB IN UTM/VTP, 3-4 HR TAT - Abnormal; Notable for the following components:       Result Value    Human Metapneumovirus Detected (*)     All other components within normal limits    Narrative:     In the setting of a positive respiratory panel with a viral infection PLUS a negative procalcitonin without other underlying concern for bacterial infection, consider observing off antibiotics or discontinuation of antibiotics and continue supportive care. If the respiratory panel is positive for  atypical bacterial infection (Bordetella pertussis, Chlamydophila pneumoniae, or Mycoplasma pneumoniae), consider antibiotic de-escalation to target atypical bacterial infection.   COMPREHENSIVE METABOLIC PANEL - Abnormal; Notable for the following components:    Glucose 112 (*)     Sodium 133 (*)     Chloride 92 (*)     All other components within normal limits    Narrative:     GFR Normal >60  Chronic Kidney Disease <60  Kidney Failure <15     CBC WITH AUTO DIFFERENTIAL - Abnormal; Notable for the following components:    Platelets 136 (*)     Neutrophil % 79.8 (*)     Lymphocyte % 12.7 (*)     Eosinophil % 0.0 (*)     Neutrophils, Absolute 7.90 (*)     All other components within normal limits   BLOOD GAS, ARTERIAL - Abnormal; Notable for the following components:    HCO3, Arterial 29.8 (*)     Base Excess, Arterial 4.2 (*)     CO2 Content 31.2 (*)     All other components within normal limits   LIPID PANEL - Abnormal; Notable for the following components:    HDL Cholesterol 64 (*)     All other components within normal limits    Narrative:     Cholesterol Reference Ranges  (U.S. Department of Health and Human Services ATP III Classifications)    Desirable          <200 mg/dL  Borderline High    200-239 mg/dL  High Risk          >240 mg/dL      Triglyceride Reference Ranges  (U.S. Department of Health and Human Services ATP III Classifications)    Normal           <150 mg/dL  Borderline High  150-199 mg/dL  High             200-499 mg/dL  Very High        >500 mg/dL    HDL Reference Ranges  (U.S. Department of Health and Human Services ATP III Classifications)    Low     <40 mg/dl (major risk factor for CHD)  High    >60 mg/dl ('negative' risk factor for CHD)        LDL Reference Ranges  (U.S. Department of Health and Human Services ATP III Classifications)    Optimal          <100 mg/dL  Near Optimal     100-129 mg/dL  Borderline High  130-159 mg/dL  High             160-189 mg/dL  Very High        >189 mg/dL  "  TSH - Abnormal; Notable for the following components:    TSH 0.229 (*)     All other components within normal limits   BNP (IN-HOUSE) - Normal    Narrative:     Among patients with dyspnea, NT-proBNP is highly sensitive for the detection of acute congestive heart failure. In addition NT-proBNP of <300 pg/ml effectively rules out acute congestive heart failure with 99% negative predictive value.     SINGLE HSTROPONIN T - Normal    Narrative:     High Sensitive Troponin T Reference Range:  <10.0 ng/L- Negative Female for AMI  <15.0 ng/L- Negative Male for AMI  >=10 - Abnormal Female indicating possible myocardial injury.  >=15 - Abnormal Male indicating possible myocardial injury.   Clinicians would have to utilize clinical acumen, EKG, Troponin, and serial changes to determine if it is an Acute Myocardial Infarction or myocardial injury due to an underlying chronic condition.        D-DIMER, QUANTITATIVE - Normal    Narrative:     According to the assay 's published package insert, a normal (<0.50 mg/L (FEU)) D-dimer result in conjunction with a non-high clinical probability assessment, excludes deep vein thrombosis (DVT) and pulmonary embolism (PE) with high sensitivity.    D-dimer values increase with age and this can make VTE exclusion of an older population difficult. To address this, the American College of Physicians, based on best available evidence and recent guidelines, recommends that clinicians use age-adjusted D-dimer thresholds in patients greater than 50 years of age with: a) a low probability of PE who do not meet all Pulmonary Embolism Rule Out Criteria, or b) in those with intermediate probability of PE.   The formula for an age-adjusted D-dimer cut-off is \"age/100\".  For example, a 60 year old patient would have an age-adjusted cut-off of 0.60 mg/L (FEU) and an 80 year old 0.80 mg/L (FEU).   HEMOGLOBIN A1C - Normal   POC LACTATE - Normal   BLOOD CULTURE   BLOOD CULTURE   RESPIRATORY " CULTURE   RAINBOW DRAW    Narrative:     The following orders were created for panel order [place] Draw.  Procedure                               Abnormality         Status                     ---------                               -----------         ------                     Green Top (Gel)[[id number]]                                                             Lavender Top[[id number]]                                     Final result               Gold Top - SST[[id number]]                                   Final result               Light Blue Top[[id number]]                                   Final result                 Please view results for these tests on the individual orders.   BLOOD GAS, ARTERIAL   POC LACTATE   CBC AND DIFFERENTIAL    Narrative:     The following orders were created for panel order CBC & Differential.  Procedure                               Abnormality         Status                     ---------                               -----------         ------                     CBC Auto Differential[[id number]]        Abnormal            Final result                 Please view results for these tests on the individual orders.   LAVENDER TOP   GOLD TOP - SST   LIGHT BLUE TOP       LOC: Person, Place, Time, and Situation    Telemetry:  Telemetry    Cardiac Monitoring Ordered: yes    EKG:   ECG 12 Lead Dyspnea   Preliminary Result   HEART RATE= 99  bpm   RR Interval= 608  ms   VA Interval= 152  ms   P Horizontal Axis= -15  deg   P Front Axis= 72  deg   QRSD Interval= 81  ms   QT Interval= 350  ms   QRS Axis= 27  deg   T Wave Axis= 84  deg   - BORDERLINE ECG -   Sinus rhythm   Biatrial enlargement   No previous ECG available for comparison   Electronically Signed By:    Date and Time of Study: 2023-05-25 09:21:17      ECG 12 Lead    (Results Pending)       Medications Given in the ED:   Medications   sodium chloride 0.9 % flush 10 mL (has no administration in time range)   sodium chloride 0.9 %  flush 10 mL (has no administration in time range)   sodium chloride 0.9 % flush 10 mL (has no administration in time range)   sodium chloride 0.9 % infusion 40 mL (has no administration in time range)   Pharmacy to Dose enoxaparin (LOVENOX) (has no administration in time range)   nitroglycerin (NITROSTAT) SL tablet 0.4 mg (has no administration in time range)   acetaminophen (TYLENOL) tablet 650 mg (has no administration in time range)   ipratropium-albuterol (DUO-NEB) nebulizer solution 3 mL (has no administration in time range)   budesonide (PULMICORT) nebulizer solution 0.5 mg (0.5 mg Nebulization Given 5/25/23 1303)   Enoxaparin Sodium (LOVENOX) syringe 40 mg (has no administration in time range)   predniSONE (DELTASONE) tablet 30 mg (has no administration in time range)     Followed by   predniSONE (DELTASONE) tablet 20 mg (has no administration in time range)     Followed by   predniSONE (DELTASONE) tablet 10 mg (has no administration in time range)   sodium chloride 0.9 % bolus 1,000 mL (0 mL Intravenous Stopped 5/25/23 1105)   methylPREDNISolone sodium succinate (SOLU-Medrol) injection 125 mg (125 mg Intravenous Given 5/25/23 0957)       Imaging results:  XR Chest 1 View    Result Date: 5/25/2023  Impression: 1.An acute pulmonary process is not apparent. Electronically Signed: Soto Tello  5/25/2023 9:59 AM EDT  Workstation ID: [id number]     Social issues:   Social History     Socioeconomic History   • Marital status: Single   Tobacco Use   • Smoking status: Every Day     Packs/day: 0.50     Types: Cigarettes     Start date: 1982   • Smokeless tobacco: Never   Vaping Use   • Vaping Use: Never used   Substance and Sexual Activity   • Alcohol use: Yes     Comment: SOCIAL   • Drug use: Not Currently   • Sexual activity: Defer       NIH Stroke Scale:  Interval: (not recorded)  1a. Level of Consciousness: (not recorded)  1b. LOC Questions: (not recorded)  1c. LOC Commands: (not recorded)  2. Best Gaze: (not  recorded)  3. Visual: (not recorded)  4. Facial Palsy: (not recorded)  5a. Motor Arm, Left: (not recorded)  5b. Motor Arm, Right: (not recorded)  6a. Motor Leg, Left: (not recorded)  6b. Motor Leg, Right: (not recorded)  7. Limb Ataxia: (not recorded)  8. Sensory: (not recorded)  9. Best Language: (not recorded)  10. Dysarthria: (not recorded)  11. Extinction and Inattention (formerly Neglect): (not recorded)    Total (NIH Stroke Scale): (not recorded)     Additional notable assessment information:     Nursing report ED to floor:      Rose Farah RN   05/25/23 13:18 EDT      Electronically signed by Rose Farah RN at 05/25/23 1440

## 2023-05-26 NOTE — PROGRESS NOTES
[facility]     Progress Note    Patient Name: Kirby Gunter  : 1965  MRN: [id number]  Primary Care Physician:  Chava López MD  Date of admission: 2023  Service date and time: 23 13:16 EDT  Subjective   Subjective     Chief Complaint: hypoxia  HPI:  Patient complains of  -Shortness of breath      Objective   Objective     Vitals:   Temp:  [98 °F (36.7 °C)-98.6 °F (37 °C)] 98.2 °F (36.8 °C)  Heart Rate:  [] 91  Resp:  [14-18] 16  BP: (102-122)/(54-68) 102/68  Flow (L/min):  [2-3] 3  Physical Exam    Constitutional: Awake, alert   Eyes: PERRLA, sclerae anicteric, no conjunctival injection   HENT: NCAT, mucous membranes moist   Neck: Supple, no thyromegaly, no lymphadenopathy, trachea midline   Respiratory: Left lower lobe crackles   Cardiovascular: RRR, no murmurs, rubs, or gallops, palpable pedal pulses bilaterally   Gastrointestinal: Positive bowel sounds, soft, nontender, nondistended   Musculoskeletal: No bilateral ankle edema, no clubbing or cyanosis to extremities   Psychiatric: Appropriate affect, cooperative   Neurologic: Oriented x 3, strength symmetric in all extremities, Cranial Nerves grossly intact to confrontation, speech clear   Skin: No rashes     Result Review    Result Review:  I have personally reviewed the results from the time of this admission to 2023 13:16 EDT and agree with these findings:  [x]  Laboratory list / accordion  []  Microbiology  [x]  Radiology  []  EKG/Telemetry   []  Cardiology/Vascular   []  Pathology  []  Old records  []  Other:        Assessment & Plan   Assessment / Plan       Active Hospital Problems:  Active Hospital Problems    Diagnosis    • **Hypoxia    • Bronchitis due to human metapneumovirus (hMPV)      Plan:    Hypoxia  -CXR negative, ABG unremarkable, D-dimer 0.35, lactic 0.7, WBCs 9.90, BNP 97  -Respiratory panel positive for human metapneumovirus  -Currently requiring 2 L NC, continue to wean and maintain O2 sat >  90%  -6 day taper Prednisone  -DuoNebs and Pulmicort  -Incentive spirometry  -CT chest without  -Sputum culture  -Monitor a.m. labs  -may need to consider pulmonary consult for possible bronchoscopy due to recurrent respiratory illness  -Continue breathing treatments for now  -Possible discharge in the morning     Current smoker  -Smoking cessation education  -NicoDerm     Hypertension  -/68  -Resume lisinopril-hydrochlorothiazide and amlodipine  -Hold if SBP <110     Hypothyroidism  -TSH 0.229  -Resume levothyroxine      DVT prophylaxis:  Medical DVT prophylaxis orders are present.      DVT prophylaxis:  Medical DVT prophylaxis orders are present.    CODE STATUS:   Level Of Support Discussed With: Patient  Code Status (Patient has no pulse and is not breathing): CPR (Attempt to Resuscitate)  Medical Interventions (Patient has pulse or is breathing): Full Support    Disposition:  I expect patient to be discharged in 1 day .    Jonny Reyes MD

## 2023-05-27 LAB
ANION GAP SERPL CALCULATED.3IONS-SCNC: 10 MMOL/L (ref 5–15)
BACTERIA SPEC RESP CULT: NORMAL
BASOPHILS # BLD AUTO: 0 10*3/MM3 (ref 0–0.2)
BASOPHILS NFR BLD AUTO: 0.1 % (ref 0–1.5)
BUN SERPL-MCNC: 17 MG/DL (ref 8–23)
BUN/CREAT SERPL: 28.8 (ref 7–25)
CALCIUM SPEC-SCNC: 8.6 MG/DL (ref 8.6–10.5)
CHLORIDE SERPL-SCNC: 99 MMOL/L (ref 98–107)
CO2 SERPL-SCNC: 30 MMOL/L (ref 22–29)
CREAT SERPL-MCNC: 0.59 MG/DL (ref 0.57–1)
DEPRECATED RDW RBC AUTO: 45.1 FL (ref 37–54)
EGFRCR SERPLBLD CKD-EPI 2021: 103.3 ML/MIN/1.73
EOSINOPHIL # BLD AUTO: 0 10*3/MM3 (ref 0–0.4)
EOSINOPHIL NFR BLD AUTO: 0 % (ref 0.3–6.2)
ERYTHROCYTE [DISTWIDTH] IN BLOOD BY AUTOMATED COUNT: 13.4 % (ref 12.3–15.4)
GLUCOSE SERPL-MCNC: 102 MG/DL (ref 65–99)
GRAM STN SPEC: NORMAL
HCT VFR BLD AUTO: 37.5 % (ref 34–46.6)
HGB BLD-MCNC: 12.1 G/DL (ref 12–15.9)
LYMPHOCYTES # BLD AUTO: 2.8 10*3/MM3 (ref 0.7–3.1)
LYMPHOCYTES NFR BLD AUTO: 16.3 % (ref 19.6–45.3)
MAGNESIUM SERPL-MCNC: 1.8 MG/DL (ref 1.6–2.4)
MCH RBC QN AUTO: 29.5 PG (ref 26.6–33)
MCHC RBC AUTO-ENTMCNC: 32.3 G/DL (ref 31.5–35.7)
MCV RBC AUTO: 91.5 FL (ref 79–97)
MONOCYTES # BLD AUTO: 1 10*3/MM3 (ref 0.1–0.9)
MONOCYTES NFR BLD AUTO: 5.7 % (ref 5–12)
NEUTROPHILS NFR BLD AUTO: 13.5 10*3/MM3 (ref 1.7–7)
NEUTROPHILS NFR BLD AUTO: 77.9 % (ref 42.7–76)
NRBC BLD AUTO-RTO: 0.2 /100 WBC (ref 0–0.2)
PLATELET # BLD AUTO: 161 10*3/MM3 (ref 140–450)
PMV BLD AUTO: 10.7 FL (ref 6–12)
POTASSIUM SERPL-SCNC: 3.6 MMOL/L (ref 3.5–5.2)
RBC # BLD AUTO: 4.09 10*6/MM3 (ref 3.77–5.28)
SODIUM SERPL-SCNC: 139 MMOL/L (ref 136–145)
WBC NRBC COR # BLD: 17.3 10*3/MM3 (ref 3.4–10.8)

## 2023-05-27 PROCEDURE — 94761 N-INVAS EAR/PLS OXIMETRY MLT: CPT

## 2023-05-27 PROCEDURE — 63710000001 PREDNISONE PER 1 MG: Performed by: NURSE PRACTITIONER

## 2023-05-27 PROCEDURE — 85025 COMPLETE CBC W/AUTO DIFF WBC: CPT | Performed by: NURSE PRACTITIONER

## 2023-05-27 PROCEDURE — 25010000002 ENOXAPARIN PER 10 MG: Performed by: NURSE PRACTITIONER

## 2023-05-27 PROCEDURE — 94664 DEMO&/EVAL PT USE INHALER: CPT

## 2023-05-27 PROCEDURE — 36415 COLL VENOUS BLD VENIPUNCTURE: CPT | Performed by: NURSE PRACTITIONER

## 2023-05-27 PROCEDURE — 83735 ASSAY OF MAGNESIUM: CPT | Performed by: STUDENT IN AN ORGANIZED HEALTH CARE EDUCATION/TRAINING PROGRAM

## 2023-05-27 PROCEDURE — 80048 BASIC METABOLIC PNL TOTAL CA: CPT | Performed by: NURSE PRACTITIONER

## 2023-05-27 PROCEDURE — 94799 UNLISTED PULMONARY SVC/PX: CPT

## 2023-05-27 RX ADMIN — IPRATROPIUM BROMIDE AND ALBUTEROL SULFATE 3 ML: .5; 3 SOLUTION RESPIRATORY (INHALATION) at 15:17

## 2023-05-27 RX ADMIN — IPRATROPIUM BROMIDE AND ALBUTEROL SULFATE 3 ML: .5; 3 SOLUTION RESPIRATORY (INHALATION) at 11:30

## 2023-05-27 RX ADMIN — LISINOPRIL 20 MG: 20 TABLET ORAL at 08:05

## 2023-05-27 RX ADMIN — Medication 10 ML: at 20:13

## 2023-05-27 RX ADMIN — BUDESONIDE 0.5 MG: 0.5 INHALANT RESPIRATORY (INHALATION) at 07:24

## 2023-05-27 RX ADMIN — ENOXAPARIN SODIUM 40 MG: 100 INJECTION SUBCUTANEOUS at 15:12

## 2023-05-27 RX ADMIN — IPRATROPIUM BROMIDE AND ALBUTEROL SULFATE 3 ML: .5; 3 SOLUTION RESPIRATORY (INHALATION) at 07:20

## 2023-05-27 RX ADMIN — BUDESONIDE 0.5 MG: 0.5 INHALANT RESPIRATORY (INHALATION) at 19:42

## 2023-05-27 RX ADMIN — PREDNISONE 20 MG: 20 TABLET ORAL at 08:06

## 2023-05-27 RX ADMIN — LEVOTHYROXINE SODIUM 88 MCG: 0.09 TABLET ORAL at 05:13

## 2023-05-27 RX ADMIN — NICOTINE 1 PATCH: 21 PATCH, EXTENDED RELEASE TRANSDERMAL at 08:06

## 2023-05-27 RX ADMIN — HYDROCHLOROTHIAZIDE 25 MG: 25 TABLET ORAL at 08:04

## 2023-05-27 RX ADMIN — Medication 10 ML: at 08:05

## 2023-05-27 RX ADMIN — PSYLLIUM HUSK 1 PACKET: 3.4 POWDER ORAL at 20:13

## 2023-05-27 RX ADMIN — PREDNISONE 10 MG: 20 TABLET ORAL at 08:04

## 2023-05-27 RX ADMIN — IPRATROPIUM BROMIDE AND ALBUTEROL SULFATE 3 ML: .5; 3 SOLUTION RESPIRATORY (INHALATION) at 19:36

## 2023-05-27 RX ADMIN — AMLODIPINE BESYLATE 5 MG: 5 TABLET ORAL at 08:05

## 2023-05-27 NOTE — PLAN OF CARE
Goal Outcome Evaluation:  Plan of Care Reviewed With: patient        Progress: improving          2L O2 applied at HS due to patient O2 sat dropping ( sustained 85-88%)  Vitals stable, RN encouraged IS use.  Will continue to monitor

## 2023-05-27 NOTE — PLAN OF CARE
Goal Outcome Evaluation:                   Pt a/ox4, wearing 2l/nc and o2 saturation remaining above 94%, possible d/c 5/28/23, clean and dry, call light in reach.

## 2023-05-27 NOTE — PROGRESS NOTES
[facility]     Progress Note    Patient Name: Kirby Gunter  : 1965  MRN: [id number]  Primary Care Physician:  Chava López MD  Date of admission: 2023  Service date and time: 23 12:49 EDT  Subjective   Subjective     Chief Complaint: Hypoxia      Patient Reports   Shortness of breath  Wheezing      Objective   Objective     Vitals:   Temp:  [97.4 °F (36.3 °C)-98.4 °F (36.9 °C)] 97.4 °F (36.3 °C)  Heart Rate:  [] 97  Resp:  [15-20] 18  BP: ()/(67-78) 110/72  Flow (L/min):  [1-2] 1  Physical Exam    Constitutional: Awake, alert, in mild respiratory distress   Eyes: PERRLA, sclerae anicteric, no conjunctival injection   HENT: NCAT, mucous membranes moist   Neck: Supple, no thyromegaly, no lymphadenopathy, trachea midline   Respiratory: Scattered expiratory wheezing   Cardiovascular: RRR, no murmurs, rubs, or gallops, palpable pedal pulses bilaterally   Gastrointestinal: Positive bowel sounds, soft, nontender, nondistended   Musculoskeletal: No bilateral ankle edema, no clubbing or cyanosis to extremities   Psychiatric: Appropriate affect, cooperative   Neurologic: Oriented x 3, strength symmetric in all extremities, Cranial Nerves grossly intact to confrontation, speech clear   Skin: No rashes     Result Review    Result Review:  I have personally reviewed the results from the time of this admission to 2023 12:49 EDT and agree with these findings:  [x]  Laboratory list / accordion  []  Microbiology  []  Radiology  []  EKG/Telemetry   []  Cardiology/Vascular   []  Pathology  []  Old records  []  Other:  Most notable findings include:    Assessment & Plan   Assessment / Plan       Active Hospital Problems:  Active Hospital Problems    Diagnosis    • **Hypoxia    • Bronchitis due to human metapneumovirus (hMPV)      Plan:    Hypoxia  -CXR negative,     Acute bronchitis due to human metapneumovirus  Continue breathing treatments      Current smoker  -Smoking cessation  education  -NicoDerm     Hypertension  -/68  -Resume lisinopril-hydrochlorothiazide and amlodipine  -Hold if SBP <110     Hypothyroidism  -TSH 0.229  -Resume levothyroxine     DVT prophylaxis:  Medical DVT prophylaxis orders are present.    CODE STATUS:   Level Of Support Discussed With: Patient  Code Status (Patient has no pulse and is not breathing): CPR (Attempt to Resuscitate)  Medical Interventions (Patient has pulse or is breathing): Full Support    Disposition:  I expect patient to be discharged in 2 days .    Jonny Reyes MD

## 2023-05-28 ENCOUNTER — APPOINTMENT (OUTPATIENT)
Dept: GENERAL RADIOLOGY | Facility: HOSPITAL | Age: 58
End: 2023-05-28
Payer: MEDICAID

## 2023-05-28 LAB
ANION GAP SERPL CALCULATED.3IONS-SCNC: 11 MMOL/L (ref 5–15)
BASOPHILS # BLD AUTO: 0 10*3/MM3 (ref 0–0.2)
BASOPHILS NFR BLD AUTO: 0.1 % (ref 0–1.5)
BUN SERPL-MCNC: 13 MG/DL (ref 8–23)
BUN/CREAT SERPL: 23.2 (ref 7–25)
CALCIUM SPEC-SCNC: 9.8 MG/DL (ref 8.6–10.5)
CHLORIDE SERPL-SCNC: 95 MMOL/L (ref 98–107)
CO2 SERPL-SCNC: 35 MMOL/L (ref 22–29)
CREAT SERPL-MCNC: 0.56 MG/DL (ref 0.57–1)
DEPRECATED RDW RBC AUTO: 44.6 FL (ref 37–54)
EGFRCR SERPLBLD CKD-EPI 2021: 104.6 ML/MIN/1.73
EOSINOPHIL # BLD AUTO: 0 10*3/MM3 (ref 0–0.4)
EOSINOPHIL NFR BLD AUTO: 0 % (ref 0.3–6.2)
ERYTHROCYTE [DISTWIDTH] IN BLOOD BY AUTOMATED COUNT: 13.8 % (ref 12.3–15.4)
GLUCOSE SERPL-MCNC: 72 MG/DL (ref 65–99)
HCT VFR BLD AUTO: 41.8 % (ref 34–46.6)
HGB BLD-MCNC: 13.5 G/DL (ref 12–15.9)
LYMPHOCYTES # BLD AUTO: 1.8 10*3/MM3 (ref 0.7–3.1)
LYMPHOCYTES NFR BLD AUTO: 9.6 % (ref 19.6–45.3)
MAGNESIUM SERPL-MCNC: 1.8 MG/DL (ref 1.6–2.4)
MCH RBC QN AUTO: 29.7 PG (ref 26.6–33)
MCHC RBC AUTO-ENTMCNC: 32.2 G/DL (ref 31.5–35.7)
MCV RBC AUTO: 92.2 FL (ref 79–97)
MONOCYTES # BLD AUTO: 1 10*3/MM3 (ref 0.1–0.9)
MONOCYTES NFR BLD AUTO: 5.2 % (ref 5–12)
NEUTROPHILS NFR BLD AUTO: 15.8 10*3/MM3 (ref 1.7–7)
NEUTROPHILS NFR BLD AUTO: 85.1 % (ref 42.7–76)
NRBC BLD AUTO-RTO: 0.1 /100 WBC (ref 0–0.2)
PLATELET # BLD AUTO: 229 10*3/MM3 (ref 140–450)
PMV BLD AUTO: 10.2 FL (ref 6–12)
POTASSIUM SERPL-SCNC: 3.4 MMOL/L (ref 3.5–5.2)
RBC # BLD AUTO: 4.54 10*6/MM3 (ref 3.77–5.28)
SODIUM SERPL-SCNC: 141 MMOL/L (ref 136–145)
WBC NRBC COR # BLD: 18.6 10*3/MM3 (ref 3.4–10.8)

## 2023-05-28 PROCEDURE — 94799 UNLISTED PULMONARY SVC/PX: CPT

## 2023-05-28 PROCEDURE — 63710000001 PREDNISONE PER 1 MG: Performed by: NURSE PRACTITIONER

## 2023-05-28 PROCEDURE — 85025 COMPLETE CBC W/AUTO DIFF WBC: CPT | Performed by: NURSE PRACTITIONER

## 2023-05-28 PROCEDURE — 71045 X-RAY EXAM CHEST 1 VIEW: CPT

## 2023-05-28 PROCEDURE — 83735 ASSAY OF MAGNESIUM: CPT | Performed by: STUDENT IN AN ORGANIZED HEALTH CARE EDUCATION/TRAINING PROGRAM

## 2023-05-28 PROCEDURE — 25010000002 ENOXAPARIN PER 10 MG: Performed by: NURSE PRACTITIONER

## 2023-05-28 PROCEDURE — 94761 N-INVAS EAR/PLS OXIMETRY MLT: CPT

## 2023-05-28 PROCEDURE — 80048 BASIC METABOLIC PNL TOTAL CA: CPT | Performed by: NURSE PRACTITIONER

## 2023-05-28 PROCEDURE — 94664 DEMO&/EVAL PT USE INHALER: CPT

## 2023-05-28 PROCEDURE — 36415 COLL VENOUS BLD VENIPUNCTURE: CPT | Performed by: NURSE PRACTITIONER

## 2023-05-28 RX ORDER — GUAIFENESIN 600 MG/1
600 TABLET, EXTENDED RELEASE ORAL EVERY 12 HOURS SCHEDULED
Status: DISCONTINUED | OUTPATIENT
Start: 2023-05-28 | End: 2023-05-29 | Stop reason: HOSPADM

## 2023-05-28 RX ADMIN — PSYLLIUM HUSK 1 PACKET: 3.4 POWDER ORAL at 21:21

## 2023-05-28 RX ADMIN — ENOXAPARIN SODIUM 40 MG: 100 INJECTION SUBCUTANEOUS at 16:23

## 2023-05-28 RX ADMIN — LISINOPRIL 20 MG: 20 TABLET ORAL at 08:17

## 2023-05-28 RX ADMIN — IPRATROPIUM BROMIDE AND ALBUTEROL SULFATE 3 ML: .5; 3 SOLUTION RESPIRATORY (INHALATION) at 10:40

## 2023-05-28 RX ADMIN — LEVOTHYROXINE SODIUM 88 MCG: 0.09 TABLET ORAL at 05:57

## 2023-05-28 RX ADMIN — BUDESONIDE 0.5 MG: 0.5 INHALANT RESPIRATORY (INHALATION) at 07:22

## 2023-05-28 RX ADMIN — HYDROCHLOROTHIAZIDE 25 MG: 25 TABLET ORAL at 08:16

## 2023-05-28 RX ADMIN — GUAIFENESIN 600 MG: 600 TABLET, EXTENDED RELEASE ORAL at 21:21

## 2023-05-28 RX ADMIN — BUDESONIDE 0.5 MG: 0.5 INHALANT RESPIRATORY (INHALATION) at 18:52

## 2023-05-28 RX ADMIN — GUAIFENESIN 600 MG: 600 TABLET, EXTENDED RELEASE ORAL at 09:38

## 2023-05-28 RX ADMIN — Medication 10 ML: at 21:21

## 2023-05-28 RX ADMIN — PREDNISONE 20 MG: 20 TABLET ORAL at 08:16

## 2023-05-28 RX ADMIN — IPRATROPIUM BROMIDE AND ALBUTEROL SULFATE 3 ML: .5; 3 SOLUTION RESPIRATORY (INHALATION) at 18:47

## 2023-05-28 RX ADMIN — ACETAMINOPHEN 650 MG: 325 TABLET, FILM COATED ORAL at 02:50

## 2023-05-28 RX ADMIN — NICOTINE 1 PATCH: 21 PATCH, EXTENDED RELEASE TRANSDERMAL at 08:18

## 2023-05-28 RX ADMIN — AMLODIPINE BESYLATE 5 MG: 5 TABLET ORAL at 08:17

## 2023-05-28 RX ADMIN — IPRATROPIUM BROMIDE AND ALBUTEROL SULFATE 3 ML: .5; 3 SOLUTION RESPIRATORY (INHALATION) at 07:25

## 2023-05-28 RX ADMIN — Medication 10 ML: at 08:18

## 2023-05-29 ENCOUNTER — READMISSION MANAGEMENT (OUTPATIENT)
Dept: CALL CENTER | Facility: HOSPITAL | Age: 58
End: 2023-05-29

## 2023-05-29 VITALS
HEART RATE: 99 BPM | RESPIRATION RATE: 20 BRPM | DIASTOLIC BLOOD PRESSURE: 74 MMHG | TEMPERATURE: 98.3 F | HEIGHT: 62 IN | BODY MASS INDEX: 25.36 KG/M2 | OXYGEN SATURATION: 93 % | WEIGHT: 137.8 LBS | SYSTOLIC BLOOD PRESSURE: 120 MMHG

## 2023-05-29 PROBLEM — R09.02 HYPOXIA: Status: RESOLVED | Noted: 2023-05-25 | Resolved: 2023-05-29

## 2023-05-29 LAB
ANION GAP SERPL CALCULATED.3IONS-SCNC: 10 MMOL/L (ref 5–15)
BUN SERPL-MCNC: 14 MG/DL (ref 8–23)
BUN/CREAT SERPL: 22.6 (ref 7–25)
CALCIUM SPEC-SCNC: 9.5 MG/DL (ref 8.6–10.5)
CHLORIDE SERPL-SCNC: 97 MMOL/L (ref 98–107)
CO2 SERPL-SCNC: 35 MMOL/L (ref 22–29)
CREAT SERPL-MCNC: 0.62 MG/DL (ref 0.57–1)
DEPRECATED RDW RBC AUTO: 45.5 FL (ref 37–54)
EGFRCR SERPLBLD CKD-EPI 2021: 102.1 ML/MIN/1.73
ERYTHROCYTE [DISTWIDTH] IN BLOOD BY AUTOMATED COUNT: 13.7 % (ref 12.3–15.4)
GLUCOSE SERPL-MCNC: 95 MG/DL (ref 65–99)
HCT VFR BLD AUTO: 41.8 % (ref 34–46.6)
HGB BLD-MCNC: 13.6 G/DL (ref 12–15.9)
MAGNESIUM SERPL-MCNC: 1.9 MG/DL (ref 1.6–2.4)
MCH RBC QN AUTO: 29.4 PG (ref 26.6–33)
MCHC RBC AUTO-ENTMCNC: 32.6 G/DL (ref 31.5–35.7)
MCV RBC AUTO: 90.2 FL (ref 79–97)
PLATELET # BLD AUTO: 233 10*3/MM3 (ref 140–450)
PMV BLD AUTO: 9.9 FL (ref 6–12)
POTASSIUM SERPL-SCNC: 3.7 MMOL/L (ref 3.5–5.2)
RBC # BLD AUTO: 4.63 10*6/MM3 (ref 3.77–5.28)
SODIUM SERPL-SCNC: 142 MMOL/L (ref 136–145)
WBC NRBC COR # BLD: 20.6 10*3/MM3 (ref 3.4–10.8)

## 2023-05-29 PROCEDURE — 94799 UNLISTED PULMONARY SVC/PX: CPT

## 2023-05-29 PROCEDURE — 85027 COMPLETE CBC AUTOMATED: CPT | Performed by: INTERNAL MEDICINE

## 2023-05-29 PROCEDURE — 94664 DEMO&/EVAL PT USE INHALER: CPT

## 2023-05-29 PROCEDURE — 63710000001 PREDNISONE PER 5 MG: Performed by: NURSE PRACTITIONER

## 2023-05-29 PROCEDURE — 94618 PULMONARY STRESS TESTING: CPT

## 2023-05-29 PROCEDURE — 83735 ASSAY OF MAGNESIUM: CPT | Performed by: STUDENT IN AN ORGANIZED HEALTH CARE EDUCATION/TRAINING PROGRAM

## 2023-05-29 PROCEDURE — 80048 BASIC METABOLIC PNL TOTAL CA: CPT | Performed by: INTERNAL MEDICINE

## 2023-05-29 RX ORDER — METHYLPREDNISOLONE 4 MG/1
TABLET ORAL
Qty: 21 TABLET | Refills: 0 | Status: SHIPPED | OUTPATIENT
Start: 2023-05-29

## 2023-05-29 RX ORDER — GUAIFENESIN 600 MG/1
600 TABLET, EXTENDED RELEASE ORAL EVERY 12 HOURS SCHEDULED
Qty: 14 TABLET | Refills: 0 | Status: SHIPPED | OUTPATIENT
Start: 2023-05-29

## 2023-05-29 RX ORDER — NICOTINE 21 MG/24HR
1 PATCH, TRANSDERMAL 24 HOURS TRANSDERMAL
Qty: 30 PATCH | Refills: 0 | Status: SHIPPED | OUTPATIENT
Start: 2023-05-29

## 2023-05-29 RX ADMIN — AMLODIPINE BESYLATE 5 MG: 5 TABLET ORAL at 08:24

## 2023-05-29 RX ADMIN — IPRATROPIUM BROMIDE AND ALBUTEROL SULFATE 3 ML: .5; 3 SOLUTION RESPIRATORY (INHALATION) at 06:50

## 2023-05-29 RX ADMIN — Medication 10 ML: at 09:33

## 2023-05-29 RX ADMIN — HYDROCHLOROTHIAZIDE 25 MG: 25 TABLET ORAL at 08:24

## 2023-05-29 RX ADMIN — NICOTINE 1 PATCH: 21 PATCH, EXTENDED RELEASE TRANSDERMAL at 09:32

## 2023-05-29 RX ADMIN — ACETAMINOPHEN 650 MG: 325 TABLET, FILM COATED ORAL at 02:46

## 2023-05-29 RX ADMIN — PREDNISONE 10 MG: 20 TABLET ORAL at 08:24

## 2023-05-29 RX ADMIN — LISINOPRIL 20 MG: 20 TABLET ORAL at 08:24

## 2023-05-29 RX ADMIN — LEVOTHYROXINE SODIUM 88 MCG: 0.09 TABLET ORAL at 05:04

## 2023-05-29 RX ADMIN — GUAIFENESIN 600 MG: 600 TABLET, EXTENDED RELEASE ORAL at 08:24

## 2023-05-29 RX ADMIN — BUDESONIDE 0.5 MG: 0.5 INHALANT RESPIRATORY (INHALATION) at 06:54

## 2023-05-29 NOTE — DISCHARGE PLACEMENT REQUEST
"Kirby Simmons (58 y.o. Female)     Date of Birth   1965    Social Security Number       Address   [address] [place] [address] [place] [address]    Home Phone       MRN   [id number]       [employer]   Patient Refused    Marital Status   Single                            Admission Date   5/25/23    Admission Type   Emergency    Admitting Provider   Jonny Reyes MD    Attending Provider   Jonny Reyes MD    Department, Room/Bed   [address] PEDIATRICS, 201/1       Discharge Date       Discharge Disposition   Home or Self Care    Discharge Destination                               Attending Provider: Jonny Reyes MD    Allergies: No Known Allergies    Isolation: Contact Drop   Infection: Human Metapneumovirus  (05/25/23)   Code Status: CPR    Ht: 157.5 cm (62\")   Wt: 62.5 kg (137 lb 12.8 oz)    Admission Cmt: None   Principal Problem: Hypoxia [R09.02]                 Active Insurance as of 5/25/2023     Primary Coverage     Payor Plan Insurance Group Employer/Plan Group    [id number]     Payor Plan Address Payor Plan Phone Number Payor Plan Fax Number Effective Dates    MAIL STOP:   4/1/2017 - None Entered    [address] [address]       [facility] [place] [address]       Subscriber Name Subscriber Birth Date Member ID       KIRBY SIMMONS 1965 [id number]                 Emergency Contacts      (Rel.) Home Phone Work Phone Mobile Phone    MARTÍN RANDHAWA (Significant Other) [phone] -- --    ARCELIA CALVERT (Sister) -- -- --            "

## 2023-05-29 NOTE — OUTREACH NOTE
Prep Survey    Flowsheet Row Responses   [facility] [place] patient discharged from? [facility]   Is LACE score < 7 ? No   Eligibility [facility]   Date of Admission 05/25/23   Date of Discharge 05/29/23   Discharge Disposition Home or Self Care   Discharge diagnosis Hypoxia   Does the patient have one of the following disease processes/diagnoses(primary or secondary)? Other   Is there a DME ordered? Yes   What DME was ordered? Oxygen 3 liters through [facility]   Prep survey completed? Yes          Lisha KEBEDE - Registered Nurse

## 2023-05-29 NOTE — CASE MANAGEMENT/SOCIAL WORK
Continued Stay Note  [facility]     Patient Name: Kirby Gunter  MRN: [id number]  Today's Date: 5/29/2023    Admit Date: 5/25/2023    Plan: DC plan: Pt plans to return home alone. Pt can drive self.. Oxygen 3 liters through [facility]   Discharge Plan     Row Name 05/29/23 1006       Plan    Plan DC plan: Pt plans to return home alone. Pt can drive self.. Oxygen 3 liters through [facility]    Plan Comments CM set up new Oxygen at 3 liters through [facility]. Called [facility] and spoke to Trish about new order and put [facility] in the basket. Portable oxygen tank was delivered by CM to patient room 201 and number given for [facility] to call when she gets home to deliver concentrator.            Met with patient in room wearing PPE: mask, face shield/goggles, gloves, gown.      Maintained distance greater than six feet and spent less than 15 minutes in the room.          Yris BEAL,RN Case Manager  [facility]  Phone: Desk- [phone] cell- [phone]

## 2023-05-29 NOTE — CASE MANAGEMENT/SOCIAL WORK
Case Management Discharge Note      Final Note: Home with oxygen 3 liters from [facility]             Durable Medical Equipment Coordination complete.    Service Provider Selected Services Address Phone Fax Patient Preferred    [facility] [address], [place] [id number] [phone] --                  Transportation Services  Private: Car    Final Discharge Disposition Code: 01 - home or self-care

## 2023-05-29 NOTE — PLAN OF CARE
Goal Outcome Evaluation:              Outcome Evaluation: PT requires O2 at 2L this shift. Pleasant and cooperative with staff. Up ad saravanan with no assistance. Non productive vough present. No s/sx of acute distress noted at this time

## 2023-05-29 NOTE — PROGRESS NOTES
Exercise Oximetry    Patient Name:Kirby Gunter   MRN: [id number]   Date: 05/29/23             ROOM AIR BASELINE   SpO2% 86% room air   Heart Rate    Blood Pressure      EXERCISE ON ROOM AIR SpO2% EXERCISE ON O2 @  3 LPM SpO2%   1 MINUTE  1 MINUTE 90   2 MINUTES  2 MINUTES 92   3 MINUTES  3 MINUTES 91   4 MINUTES  4 MINUTES 91   5 MINUTES  5 MINUTES 92   6 MINUTES  6 MINUTES 92              Distance Walked   Distance Walked   Dyspnea (Ian Scale)   Dyspnea (Ian Scale)   Fatigue (Ian Scale)   Fatigue (Ian Scale)   SpO2% Post Exercise   SpO2% Post Exercise 93% 3L NC   HR Post Exercise   HR Post Exercise   Time to Recovery   Time to Recovery     Comments:

## 2023-05-29 NOTE — DISCHARGE SUMMARY
[facility]         DISCHARGE SUMMARY    Patient Name: Kirby Gunter  : 1965  MRN: [id number]    Date of Admission: 2023  Date of Discharge: 2023   primary Care Physician: Chava López MD    Consults     Date and Time Order Name Status Description    2023 12:34 PM Hospitalist (on-call MD unless specified)            Presenting Problem:   Hypoxia [R09.02]  Dyspnea, unspecified type [R06.00]  Bronchitis due to human metapneumovirus (hMPV) [J40, B97.81]    Active and Resolved Hospital Problems:  Active Hospital Problems    Diagnosis POA   • Bronchitis due to human metapneumovirus (hMPV) [J40, B97.81] Yes      Resolved Hospital Problems    Diagnosis POA   • **Hypoxia [R09.02] Yes         Hospital Course     Hospital Course:  Kirby Gunter is a 58 y.o. female who presented to [facility] on 2023 from Dr. López's office, due to low oxygen level. She reports having respiratory illnesses off and on since March. She improves for short periods and then becomes ill again. She reports yellow mucous production, denies SOB, and is a current smoker. She reports she had been living at her boyfriends home for a year up until the end of March this year. She reports she saw mold at her boyfriend's home. 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     The patient was admitted, started on p.o. prednisone, breathing treatment, incentive spirometer, nicotine transdermal patch, she remained afebrile, she was found to have increased WBC, secondary to the use of prednisone.  Cultures showed no growth, repeat chest x-ray was unremarkable.  She will get a 6-minute walk and to evaluate for home oxygen prior to discharge.  She was counseled about smoking cessation on several times during her hospital stay.  A prescription for nicotine patch as well as Medrol Dosepak were placed.            Day of Discharge     Vital Signs:  Temp:  [98.2 °F  (36.8 °C)-98.7 °F (37.1 °C)] 98.2 °F (36.8 °C)  Heart Rate:  [] 99  Resp:  [12-20] 18  BP: (102-110)/(53-68) 110/68  Flow (L/min):  [1-2] 2  Physical Exam:  Constitutional: Awake, alert   Eyes: PERRLA, sclerae anicteric, no conjunctival injection   HENT: NCAT, mucous membranes moist   Neck: Supple, no thyromegaly, no lymphadenopathy, trachea midline   Respiratory: Clear to auscultation bilaterally, nonlabored respirations    Cardiovascular: RRR, no murmurs, rubs, or gallops, palpable pedal pulses bilaterally   Gastrointestinal: Positive bowel sounds, soft, nontender, nondistended   Musculoskeletal: No bilateral ankle edema, no clubbing or cyanosis to extremities   Psychiatric: Appropriate affect, cooperative   Neurologic: Oriented x 3, strength symmetric in all extremities, Cranial Nerves grossly intact to confrontation, speech clear   Skin: No rashes     Pertinent  and/or Most Recent Results     LAB RESULTS:      Lab 05/29/23  0641 05/28/23  1004 05/27/23  0121 05/26/23  0600 05/25/23  0950 05/25/23  0949   WBC 20.60* 18.60* 17.30* 11.10* 9.90  --    HEMOGLOBIN 13.6 13.5 12.1 12.6 13.0  --    HEMATOCRIT 41.8 41.8 37.5 39.3 39.2  --    PLATELETS 233 229 161 132* 136*  --    NEUTROS ABS  --  15.80* 13.50* 9.00* 7.90*  --    LYMPHS ABS  --  1.80 2.80 1.40 1.30  --    MONOS ABS  --  1.00* 1.00* 0.80 0.70  --    EOS ABS  --  0.00 0.00 0.00 0.00  --    MCV 90.2 92.2 91.5 93.4 90.5  --    LACTATE  --   --   --   --   --  0.7         Lab 05/29/23  0641 05/28/23  1004 05/27/23  0121 05/26/23  0600 05/25/23  0950   SODIUM 142 141 139 138 133*   POTASSIUM 3.7 3.4* 3.6 4.0 3.7   CHLORIDE 97* 95* 99 100 92*   CO2 35.0* 35.0* 30.0* 30.0* 29.0   ANION GAP 10.0 11.0 10.0 8.0 12.0   BUN 14 13 17 13 12   CREATININE 0.62 0.56* 0.59 0.52* 0.65   EGFR 102.1 104.6 103.3 106.5 100.9   GLUCOSE 95 72 102* 129* 112*   CALCIUM 9.5 9.8 8.6 9.1 8.9   MAGNESIUM 1.9 1.8 1.8 1.9  --    HEMOGLOBIN A1C  --   --   --   --  5.60   TSH  --   --    --   --  0.229*         Lab 05/25/23  0950   TOTAL PROTEIN 7.0   ALBUMIN 4.2   GLOBULIN 2.8   ALT (SGPT) 19   AST (SGOT) 30   BILIRUBIN 0.3   ALK PHOS 65         Lab 05/25/23  0950   PROBNP 97.2   HSTROP T 8         Lab 05/25/23  0950   CHOLESTEROL 151   LDL CHOL 77   HDL CHOL 64*   TRIGLYCERIDES 48             Lab 05/25/23  0949   PH, ARTERIAL 7.413   PCO2, ARTERIAL 46.7   PO2 ART 83.3   O2 SATURATION ART 96.2   FIO2 28   HCO3 ART 29.8*   BASE EXCESS ART 4.2*     Brief Urine Lab Results     None        Microbiology Results (last 10 days)     Procedure Component Value - Date/Time    Respiratory Culture - Sputum, Cough [[id number]] Collected: 05/26/23 2010    Lab Status: Final result Specimen: Sputum from Cough Updated: 05/27/23 0630     Respiratory Culture Rejected     Gram Stain Few (2+) WBCs per low power field      Few (2+) Epithelial cells per low power field      Moderate (3+) Mixed bacterial morphotypes seen on Gram Stain    Narrative:      Specimen rejected due to oropharyngeal contamination. Please reorder and recollect specimen if clinically necessary.    Blood Culture - Blood, Arm, Left [[id number]]  (Normal) Collected: 05/25/23 1142    Lab Status: Preliminary result Specimen: Blood from Arm, Left Updated: 05/28/23 1200     Blood Culture No growth at 3 days    Narrative:      Less than seven (7) mL's of blood was collected.  Insufficient quantity may yield false negative results.    Respiratory Panel PCR w/COVID-19(SARS-CoV-2) [facility] In-House, NP Swab in [facility], 3-4 HR TAT - Swab, Nasopharynx [[id number]]  (Abnormal) Collected: 05/25/23 0950    Lab Status: Final result Specimen: Swab from Nasopharynx Updated: 05/25/23 1045     ADENOVIRUS, PCR Not Detected     Coronavirus 229E Not Detected     Coronavirus HKU1 Not Detected     Coronavirus NL63 Not Detected     Coronavirus OC43 Not Detected     COVID19 Not Detected     Human Metapneumovirus Detected     Human Rhinovirus/Enterovirus Not  Detected     Influenza A PCR Not Detected     Influenza B PCR Not Detected     Parainfluenza Virus 1 Not Detected     Parainfluenza Virus 2 Not Detected     Parainfluenza Virus 3 Not Detected     Parainfluenza Virus 4 Not Detected     RSV, PCR Not Detected     Bordetella pertussis pcr Not Detected     Bordetella parapertussis PCR Not Detected     Chlamydophila pneumoniae PCR Not Detected     Mycoplasma pneumo by PCR Not Detected    Narrative:      In the setting of a positive respiratory panel with a viral infection PLUS a negative procalcitonin without other underlying concern for bacterial infection, consider observing off antibiotics or discontinuation of antibiotics and continue supportive care. If the respiratory panel is positive for atypical bacterial infection (Bordetella pertussis, Chlamydophila pneumoniae, or Mycoplasma pneumoniae), consider antibiotic de-escalation to target atypical bacterial infection.    Blood Culture - Blood, Arm, Left [[id number]]  (Normal) Collected: 05/25/23 0950    Lab Status: Preliminary result Specimen: Blood from Arm, Left Updated: 05/28/23 1001     Blood Culture No growth at 3 days    Narrative:      Less than seven (7) mL's of blood was collected.  Insufficient quantity may yield false negative results.          CT Chest Without Contrast Diagnostic    Result Date: 5/25/2023  Impression: Scattered bilateral small nodular infiltrates which may represent developing pneumonia versus pneumonitis. No consolidation or pleural effusion. Electronically Signed: Nain Dodd  5/25/2023 4:52 PM EDT  Workstation ID: [id number]    XR Chest 1 View    Result Date: 5/28/2023  Impression: Impression: No acute cardiopulmonary abnormality is identified on chest x-ray. Extensive groundglass micronodules seen on recent CT chest are not well visualized on chest x-ray. Electronically Signed: Nikki Mcclellan  5/28/2023 10:50 AM EDT  Workstation ID: [id number]    XR Chest 1 View    Result Date:  5/25/2023  Impression: Impression: 1.An acute pulmonary process is not apparent. Electronically Signed: Soto Tello  5/25/2023 9:59 AM EDT  Workstation ID: [id number]                  Labs Pending at Discharge:  Pending Labs     Order Current Status    Blood Culture - Blood, Arm, Left Preliminary result    Blood Culture - Blood, Arm, Left Preliminary result            Discharge Details        Discharge Medications      New Medications      Instructions Start Date   guaiFENesin 600 MG 12 hr tablet  Commonly known as: MUCINEX   600 mg, Oral, Every 12 Hours Scheduled      methylPREDNISolone 4 MG dose pack  Commonly known as: MEDROL   Take as directed on package instructions.      nicotine 21 MG/24HR patch  Commonly known as: NICODERM CQ   1 patch, Transdermal, Every 24 Hours Scheduled         Continue These Medications      Instructions Start Date   albuterol sulfate  (90 Base) MCG/ACT inhaler  Commonly known as: PROVENTIL HFA;VENTOLIN HFA;PROAIR HFA   2 puffs, Inhalation, Every 4 Hours PRN      amLODIPine 5 MG tablet  Commonly known as: NORVASC   5 mg, Oral, Daily      levothyroxine 88 MCG tablet  Commonly known as: SYNTHROID, LEVOTHROID   TAKE ONE TABLET BY MOUTH DAILY      lisinopril-hydrochlorothiazide 20-25 MG per tablet  Commonly known as: PRINZIDE,ZESTORETIC   TAKE ONE TABLET BY MOUTH DAILY             No Known Allergies      Discharge Disposition:   Home or Self Care    Discharge Condition: .cond    Diet:  Hospital:  Diet Order   Procedures   • Diet: Regular/House Diet; Texture: Regular Texture (IDDSI 7); Fluid Consistency: Thin (IDDSI 0)         Discharge Activity:         CODE STATUS:  Code Status and Medical Interventions:   Ordered at: 05/25/23 1237     Level Of Support Discussed With:    Patient     Code Status (Patient has no pulse and is not breathing):    CPR (Attempt to Resuscitate)     Medical Interventions (Patient has pulse or is breathing):    Full Support         Future Appointments   Date  Time Provider Department [place]   6/15/2023 11:30 AM Chava López MD [facility]           Time spent on Discharge including face to face service:  30 minutes    Jonny Reyes MD

## 2023-05-30 ENCOUNTER — TRANSITIONAL CARE MANAGEMENT TELEPHONE ENCOUNTER (OUTPATIENT)
Dept: CALL CENTER | Facility: HOSPITAL | Age: 58
End: 2023-05-30

## 2023-05-30 LAB
BACTERIA SPEC AEROBE CULT: NORMAL
BACTERIA SPEC AEROBE CULT: NORMAL

## 2023-05-30 NOTE — OUTREACH NOTE
Call Center TCM Note    Flowsheet Row Responses   [facility] patient discharged from? [facility]   Does the patient have one of the following disease processes/diagnoses(primary or secondary)? Other   TCM attempt successful? Yes  [VR for contacts]   Call start time 0809   Call end time 0814   Discharge diagnosis Hypoxia   Meds reviewed with patient/caregiver? Yes   Is the patient having any side effects they believe may be caused by any medication additions or changes? No   Does the patient have all medications ordered at discharge? Yes   Is the patient taking all medications as directed (includes completed medication regime)? Yes   Comments HOSP DC FU appt 6/1/23 845 am.   Does the patient have an appointment with their PCP within 7 days of discharge? Yes   Has home health visited the patient within 72 hours of discharge? N/A   What DME was ordered? Oxygen 3 liters through [facility]   Has all DME been delivered? Yes   Psychosocial issues? No   Did the patient receive a copy of their discharge instructions? Yes   Nursing interventions Reviewed instructions with patient   What is the patient's perception of their health status since discharge? Improving   Is the patient/caregiver able to teach back signs and symptoms related to disease process for when to call PCP? Yes   Is the patient/caregiver able to teach back signs and symptoms related to disease process for when to call 911? Yes   Is the patient/caregiver able to teach back the hierarchy of who to call/visit for symptoms/problems? PCP, Specialist, Home health nurse, Urgent Care, ED, 911 Yes   If the patient is a current smoker, are they able to teach back resources for cessation? --  [Has not been smoking]   TCM call completed? Yes   Wrap up additional comments Pt reports she is doing better.   Call end time 0814          Sharona Harvey RN    5/30/2023, 08:15 EDT

## 2023-05-30 NOTE — PAYOR COMM NOTE
"NOTIFICATION OF DISCHARGE:          Kirby Simmons (58 y.o. Female) 1965  AUTH # [id number]        DISCHARGED TO HOME ON 05/29/2023                  Aundrea Erazo RN MSN  /UR  [facility]  [phone] office  [phone] fax  [email or web address]    [facility]  NPI: [id number]  Tax: [id number]            Kirby Simmons (58 y.o. Female)     Date of Birth   1965    Social Security Number       Address   [address] [place] [address] [place] [facility]    Home Phone       MRN   [id number]       Gnosticist   Patient Refused    Marital Status   Single                            Admission Date   5/25/23    Admission Type   Emergency    Admitting Provider   Jonny Reyes MD    Attending Provider       Department, Room/Bed   [facility] 2A PEDIATRICS, 201/1       Discharge Date   5/29/2023    Discharge Disposition   Home or Self Care    Discharge Destination                               Attending Provider: (none)   Allergies: No Known Allergies    Isolation: None   Infection: Human Metapneumovirus  (05/25/23)   Code Status: Prior    Ht: 157.5 cm (62\")   Wt: 62.5 kg (137 lb 12.8 oz)    Admission Cmt: None   Principal Problem: Hypoxia [R09.02]                 Active Insurance as of 5/25/2023     Primary Coverage     Payor Plan Insurance Group Employer/Plan Group    [id number]     Payor Plan Address Payor Plan Phone Number Payor Plan Fax Number Effective Dates    MAIL STOP:   4/1/2017 - None Entered    [address] [address]       [facility] [address]       Subscriber Name Subscriber Birth Date Member ID       KIRBY SIMMONS 1965 [id number]                 Emergency Contacts      (Rel.) Home Phone Work Phone Mobile Phone    MARTÍN RANDHAWA (Significant Other) [phone] -- --    ARCELIA CALVERT (Sister) -- -- --               Discharge Summary      Jonny Reyes MD at 05/29/23 0826      "                    [facility]         DISCHARGE SUMMARY    Patient Name: Kirby Gunter  : 1965  MRN: [id number]    Date of Admission: 2023  Date of Discharge: 2023   primary Care Physician: Chava López MD    Consults     Date and Time Order Name Status Description    2023 12:34 PM Hospitalist (on-call MD unless specified)            Presenting Problem:   Hypoxia [R09.02]  Dyspnea, unspecified type [R06.00]  Bronchitis due to human metapneumovirus (hMPV) [J40, B97.81]    Active and Resolved Hospital Problems:  Active Hospital Problems    Diagnosis POA   • Bronchitis due to human metapneumovirus (hMPV) [J40, B97.81] Yes      Resolved Hospital Problems    Diagnosis POA   • **Hypoxia [R09.02] Yes         Hospital Course     Hospital Course:  Kirby Gunter is a 58 y.o. female who presented to [facility] on 2023 from Dr. López's office, due to low oxygen level. She reports having respiratory illnesses off and on since March. She improves for short periods and then becomes ill again. She reports yellow mucous production, denies SOB, and is a current smoker. She reports she had been living at her boyfriends home for a year up until the end of March this year. She reports she saw mold at her boyfriend's home. 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     The patient was admitted, started on p.o. prednisone, breathing treatment, incentive spirometer, nicotine transdermal patch, she remained afebrile, she was found to have increased WBC, secondary to the use of prednisone.  Cultures showed no growth, repeat chest x-ray was unremarkable.  She will get a 6-minute walk and to evaluate for home oxygen prior to discharge.  She was counseled about smoking cessation on several times during her hospital stay.  A prescription for nicotine patch as well as Medrol Dosepak were placed.            Day of Discharge     Vital Signs:  Temp:  [98.2  °F (36.8 °C)-98.7 °F (37.1 °C)] 98.2 °F (36.8 °C)  Heart Rate:  [] 99  Resp:  [12-20] 18  BP: (102-110)/(53-68) 110/68  Flow (L/min):  [1-2] 2  Physical Exam:  Constitutional: Awake, alert   Eyes: PERRLA, sclerae anicteric, no conjunctival injection   HENT: NCAT, mucous membranes moist   Neck: Supple, no thyromegaly, no lymphadenopathy, trachea midline   Respiratory: Clear to auscultation bilaterally, nonlabored respirations    Cardiovascular: RRR, no murmurs, rubs, or gallops, palpable pedal pulses bilaterally   Gastrointestinal: Positive bowel sounds, soft, nontender, nondistended   Musculoskeletal: No bilateral ankle edema, no clubbing or cyanosis to extremities   Psychiatric: Appropriate affect, cooperative   Neurologic: Oriented x 3, strength symmetric in all extremities, Cranial Nerves grossly intact to confrontation, speech clear   Skin: No rashes     Pertinent  and/or Most Recent Results     LAB RESULTS:      Lab 05/29/23  0641 05/28/23  1004 05/27/23  0121 05/26/23  0600 05/25/23  0950 05/25/23  0949   WBC 20.60* 18.60* 17.30* 11.10* 9.90  --    HEMOGLOBIN 13.6 13.5 12.1 12.6 13.0  --    HEMATOCRIT 41.8 41.8 37.5 39.3 39.2  --    PLATELETS 233 229 161 132* 136*  --    NEUTROS ABS  --  15.80* 13.50* 9.00* 7.90*  --    LYMPHS ABS  --  1.80 2.80 1.40 1.30  --    MONOS ABS  --  1.00* 1.00* 0.80 0.70  --    EOS ABS  --  0.00 0.00 0.00 0.00  --    MCV 90.2 92.2 91.5 93.4 90.5  --    LACTATE  --   --   --   --   --  0.7         Lab 05/29/23  0641 05/28/23  1004 05/27/23  0121 05/26/23  0600 05/25/23  0950   SODIUM 142 141 139 138 133*   POTASSIUM 3.7 3.4* 3.6 4.0 3.7   CHLORIDE 97* 95* 99 100 92*   CO2 35.0* 35.0* 30.0* 30.0* 29.0   ANION GAP 10.0 11.0 10.0 8.0 12.0   BUN 14 13 17 13 12   CREATININE 0.62 0.56* 0.59 0.52* 0.65   EGFR 102.1 104.6 103.3 106.5 100.9   GLUCOSE 95 72 102* 129* 112*   CALCIUM 9.5 9.8 8.6 9.1 8.9   MAGNESIUM 1.9 1.8 1.8 1.9  --    HEMOGLOBIN A1C  --   --   --   --  5.60   TSH  --    --   --   --  0.229*         Lab 05/25/23  0950   TOTAL PROTEIN 7.0   ALBUMIN 4.2   GLOBULIN 2.8   ALT (SGPT) 19   AST (SGOT) 30   BILIRUBIN 0.3   ALK PHOS 65         Lab 05/25/23  0950   PROBNP 97.2   HSTROP T 8         Lab 05/25/23  0950   CHOLESTEROL 151   LDL CHOL 77   HDL CHOL 64*   TRIGLYCERIDES 48             Lab 05/25/23  0949   PH, ARTERIAL 7.413   PCO2, ARTERIAL 46.7   PO2 ART 83.3   O2 SATURATION ART 96.2   FIO2 28   HCO3 ART 29.8*   BASE EXCESS ART 4.2*     Brief Urine Lab Results     None        Microbiology Results (last 10 days)     Procedure Component Value - Date/Time    Respiratory Culture - Sputum, Cough [[id number]] Collected: 05/26/23 2010    Lab Status: Final result Specimen: Sputum from Cough Updated: 05/27/23 0630     Respiratory Culture Rejected     Gram Stain Few (2+) WBCs per low power field      Few (2+) Epithelial cells per low power field      Moderate (3+) Mixed bacterial morphotypes seen on Gram Stain    Narrative:      Specimen rejected due to oropharyngeal contamination. Please reorder and recollect specimen if clinically necessary.    Blood Culture - Blood, Arm, Left [[id number]]  (Normal) Collected: 05/25/23 1142    Lab Status: Preliminary result Specimen: Blood from Arm, Left Updated: 05/28/23 1200     Blood Culture No growth at 3 days    Narrative:      Less than seven (7) mL's of blood was collected.  Insufficient quantity may yield false negative results.    Respiratory Panel PCR w/COVID-19(SARS-CoV-2) [facility] In-House, NP Swab in UTM/VTM, 3-4 HR TAT - Swab, Nasopharynx [[id number]]  (Abnormal) Collected: 05/25/23 0950    Lab Status: Final result Specimen: Swab from Nasopharynx Updated: 05/25/23 1045     ADENOVIRUS, PCR Not Detected     Coronavirus 229E Not Detected     Coronavirus HKU1 Not Detected     Coronavirus NL63 Not Detected     Coronavirus OC43 Not Detected     COVID19 Not Detected     Human Metapneumovirus Detected     Human Rhinovirus/Enterovirus  Not Detected     Influenza A PCR Not Detected     Influenza B PCR Not Detected     Parainfluenza Virus 1 Not Detected     Parainfluenza Virus 2 Not Detected     Parainfluenza Virus 3 Not Detected     Parainfluenza Virus 4 Not Detected     RSV, PCR Not Detected     Bordetella pertussis pcr Not Detected     Bordetella parapertussis PCR Not Detected     Chlamydophila pneumoniae PCR Not Detected     Mycoplasma pneumo by PCR Not Detected    Narrative:      In the setting of a positive respiratory panel with a viral infection PLUS a negative procalcitonin without other underlying concern for bacterial infection, consider observing off antibiotics or discontinuation of antibiotics and continue supportive care. If the respiratory panel is positive for atypical bacterial infection (Bordetella pertussis, Chlamydophila pneumoniae, or Mycoplasma pneumoniae), consider antibiotic de-escalation to target atypical bacterial infection.    Blood Culture - Blood, Arm, Left [[id number]]  (Normal) Collected: 05/25/23 0950    Lab Status: Preliminary result Specimen: Blood from Arm, Left Updated: 05/28/23 1001     Blood Culture No growth at 3 days    Narrative:      Less than seven (7) mL's of blood was collected.  Insufficient quantity may yield false negative results.          CT Chest Without Contrast Diagnostic    Result Date: 5/25/2023  Impression: Scattered bilateral small nodular infiltrates which may represent developing pneumonia versus pneumonitis. No consolidation or pleural effusion. Electronically Signed: Nain Dodd  5/25/2023 4:52 PM EDT  Workstation ID: [id number]    XR Chest 1 View    Result Date: 5/28/2023  Impression: Impression: No acute cardiopulmonary abnormality is identified on chest x-ray. Extensive groundglass micronodules seen on recent CT chest are not well visualized on chest x-ray. Electronically Signed: Nikki Mcclellan  5/28/2023 10:50 AM EDT  Workstation ID: [id number]    XR Chest 1 View    Result Date:  5/25/2023  Impression: Impression: 1.An acute pulmonary process is not apparent. Electronically Signed: Soto Tello  5/25/2023 9:59 AM EDT  Workstation ID: [id number]                  Labs Pending at Discharge:  Pending Labs     Order Current Status    Blood Culture - Blood, Arm, Left Preliminary result    Blood Culture - Blood, Arm, Left Preliminary result            Discharge Details        Discharge Medications      New Medications      Instructions Start Date   guaiFENesin 600 MG 12 hr tablet  Commonly known as: MUCINEX   600 mg, Oral, Every 12 Hours Scheduled      methylPREDNISolone 4 MG dose pack  Commonly known as: MEDROL   Take as directed on package instructions.      nicotine 21 MG/24HR patch  Commonly known as: NICODERM CQ   1 patch, Transdermal, Every 24 Hours Scheduled         Continue These Medications      Instructions Start Date   albuterol sulfate  (90 Base) MCG/ACT inhaler  Commonly known as: PROVENTIL HFA;VENTOLIN HFA;PROAIR HFA   2 puffs, Inhalation, Every 4 Hours PRN      amLODIPine 5 MG tablet  Commonly known as: NORVASC   5 mg, Oral, Daily      levothyroxine 88 MCG tablet  Commonly known as: SYNTHROID, LEVOTHROID   TAKE ONE TABLET BY MOUTH DAILY      lisinopril-hydrochlorothiazide 20-25 MG per tablet  Commonly known as: PRINZIDE,ZESTORETIC   TAKE ONE TABLET BY MOUTH DAILY             No Known Allergies      Discharge Disposition:   Home or Self Care    Discharge Condition: .cond    Diet:  Hospital:  Diet Order   Procedures   • Diet: Regular/House Diet; Texture: Regular Texture (IDDSI 7); Fluid Consistency: Thin (IDDSI 0)         Discharge Activity:         CODE STATUS:  Code Status and Medical Interventions:   Ordered at: 05/25/23 1237     Level Of Support Discussed With:    Patient     Code Status (Patient has no pulse and is not breathing):    CPR (Attempt to Resuscitate)     Medical Interventions (Patient has pulse or is breathing):    Full Support         Future Appointments   Date  Time Provider Department [place]   6/15/2023 11:30 AM Chava López MD [facility]           Time spent on Discharge including face to face service:  30 minutes    Jonny Reyes MD      Electronically signed by Jonny Reyes MD at 05/29/23 0829       Discharge Order (From admission, onward)     Start     Ordered    05/29/23 0824  Discharge patient  Once        Expected Discharge Date: 05/29/23    Expected Discharge Time: Morning    Discharge Disposition: Home or Self Care    Physician of Record for Attribution - Please select from Treatment Team: JONNY REYES [[id number]]    Review needed by CMO to determine Physician of Record: No       Question Answer Comment   Physician of Record for Attribution - Please select from Treatment Team JONNY REYES    Review needed by CMO to determine Physician of Record No        05/29/23 0825

## 2023-05-31 NOTE — PROGRESS NOTES
"Chief Complaint  Hospital Follow Up Visit and Bronchitis    Subjective        Kirby Gunter presents to [facility]  History of Present Illness    Pt is here today following up from 05/25-05/29 hospitalization from bronchitis due to human metapneumovirus, dyspnea, and hypoxia.    She went home on 3L of oxygen. She quit smoking. She is afebrile. She is fatigued still. She is still taking medrol pack. She has been using her IS. She is using her albuterol every 4-6 hours. Her nose is dry frm O2. Appetitie is still decreased.     She is walking okay. She has noticed getting winded after climbing her stairs. She is coughing up yellow/brown phlegm.    She is worried about missing so much work financially. She is a  worker.    Objective   Vital Signs:  /71 (BP Location: Left arm, Patient Position: Sitting, Cuff Size: Adult)   Pulse 102   Temp 98.2 °F (36.8 °C) (Temporal)   Ht 157.5 cm (62\")   Wt 60.8 kg (134 lb)   SpO2 90%   BMI 24.51 kg/m²   Estimated body mass index is 24.51 kg/m² as calculated from the following:    Height as of this encounter: 157.5 cm (62\").    Weight as of this encounter: 60.8 kg (134 lb).          Review of Systems   Constitutional: Positive for appetite change and fatigue. Negative for chills, diaphoresis and fever.   HENT: Positive for congestion, postnasal drip, rhinorrhea and sinus pressure. Negative for ear discharge, ear pain, sore throat, tinnitus and trouble swallowing.    Respiratory: Positive for cough and shortness of breath. Negative for chest tightness and wheezing.    Cardiovascular: Negative for chest pain, palpitations and leg swelling.   Gastrointestinal: Negative for abdominal pain, nausea and vomiting.   Musculoskeletal: Negative for myalgias, neck pain and neck stiffness.   Skin: Negative for color change, rash and wound.   Neurological: Negative for dizziness, syncope, light-headedness and confusion.   Psychiatric/Behavioral: " Negative for agitation, behavioral problems and decreased concentration. The patient is not nervous/anxious.         Physical Exam  Vitals reviewed.   Constitutional:       General: She is not in acute distress.     Appearance: Normal appearance. She is ill-appearing. She is not toxic-appearing or diaphoretic.   HENT:      Nose: Congestion present.      Right Sinus: Maxillary sinus tenderness and frontal sinus tenderness present.      Left Sinus: Maxillary sinus tenderness and frontal sinus tenderness present.   Eyes:      Conjunctiva/sclera: Conjunctivae normal.      Pupils: Pupils are equal, round, and reactive to light.   Cardiovascular:      Rate and Rhythm: Normal rate and regular rhythm.      Pulses: Normal pulses.      Heart sounds: Normal heart sounds.   Pulmonary:      Effort: Pulmonary effort is normal.      Breath sounds: Decreased breath sounds and rhonchi present.   Skin:     General: Skin is warm and dry.      Capillary Refill: Capillary refill takes less than 2 seconds.   Neurological:      General: No focal deficit present.      Mental Status: She is alert and oriented to person, place, and time.   Psychiatric:         Mood and Affect: Mood normal.         Behavior: Behavior normal.         Thought Content: Thought content normal.         Judgment: Judgment normal.        Result Review :                Assessment and Plan   Diagnoses and all orders for this visit:    1. Hospital discharge follow-up (Primary)    2. Bronchitis due to human metapneumovirus (hMPV)  -     doxycycline (VIBRAMYCIN) 100 MG capsule; Take 1 capsule by mouth 2 (Two) Times a Day for 10 days.  Dispense: 20 capsule; Refill: 0    3. Stopped smoking less than 1 month ago             Follow Up   Return for Next scheduled follow up.  Patient was given instructions and counseling regarding her condition or for health maintenance advice. Please see specific information pulled into the AVS if appropriate.

## 2023-06-01 ENCOUNTER — OFFICE VISIT (OUTPATIENT)
Dept: FAMILY MEDICINE CLINIC | Facility: CLINIC | Age: 58
End: 2023-06-01

## 2023-06-01 VITALS
OXYGEN SATURATION: 90 % | HEIGHT: 62 IN | TEMPERATURE: 98.2 F | HEART RATE: 102 BPM | WEIGHT: 134 LBS | BODY MASS INDEX: 24.66 KG/M2 | DIASTOLIC BLOOD PRESSURE: 71 MMHG | SYSTOLIC BLOOD PRESSURE: 110 MMHG

## 2023-06-01 DIAGNOSIS — Z87.891: ICD-10-CM

## 2023-06-01 DIAGNOSIS — Z09 HOSPITAL DISCHARGE FOLLOW-UP: Primary | ICD-10-CM

## 2023-06-01 DIAGNOSIS — B97.81 BRONCHITIS DUE TO HUMAN METAPNEUMOVIRUS (HMPV): ICD-10-CM

## 2023-06-01 DIAGNOSIS — J40 BRONCHITIS DUE TO HUMAN METAPNEUMOVIRUS (HMPV): ICD-10-CM

## 2023-06-01 RX ORDER — DOXYCYCLINE HYCLATE 100 MG/1
100 CAPSULE ORAL 2 TIMES DAILY
Qty: 20 CAPSULE | Refills: 0 | Status: SHIPPED | OUTPATIENT
Start: 2023-06-01 | End: 2023-06-11

## 2023-06-01 NOTE — PATIENT INSTRUCTIONS
-You may begin feeling better before you are finished with your antibiotics. Please finish the course completely even after feeling better, to prevent future drug resistant infections.

## 2023-06-08 DIAGNOSIS — E03.9 ACQUIRED HYPOTHYROIDISM: ICD-10-CM

## 2023-06-08 RX ORDER — LEVOTHYROXINE SODIUM 88 UG/1
TABLET ORAL
Qty: 30 TABLET | Refills: 3 | Status: SHIPPED | OUTPATIENT
Start: 2023-06-08

## 2023-06-08 RX ORDER — LISINOPRIL AND HYDROCHLOROTHIAZIDE 25; 20 MG/1; MG/1
TABLET ORAL
Qty: 30 TABLET | Refills: 3 | Status: SHIPPED | OUTPATIENT
Start: 2023-06-08

## 2023-06-15 ENCOUNTER — OFFICE VISIT (OUTPATIENT)
Dept: FAMILY MEDICINE CLINIC | Facility: CLINIC | Age: 58
End: 2023-06-15
Payer: MEDICAID

## 2023-06-15 ENCOUNTER — LAB (OUTPATIENT)
Dept: FAMILY MEDICINE CLINIC | Facility: CLINIC | Age: 58
End: 2023-06-15
Payer: MEDICAID

## 2023-06-15 VITALS
TEMPERATURE: 97.5 F | HEART RATE: 92 BPM | OXYGEN SATURATION: 95 % | DIASTOLIC BLOOD PRESSURE: 77 MMHG | BODY MASS INDEX: 24.84 KG/M2 | WEIGHT: 135.8 LBS | SYSTOLIC BLOOD PRESSURE: 116 MMHG

## 2023-06-15 DIAGNOSIS — I10 ESSENTIAL HYPERTENSION: Primary | ICD-10-CM

## 2023-06-15 DIAGNOSIS — E03.9 ACQUIRED HYPOTHYROIDISM: ICD-10-CM

## 2023-06-15 DIAGNOSIS — R06.02 SHORTNESS OF BREATH: ICD-10-CM

## 2023-06-15 LAB
ALBUMIN SERPL-MCNC: 4 G/DL (ref 3.5–5.2)
ALBUMIN/GLOB SERPL: 1.5 G/DL
ALP SERPL-CCNC: 58 U/L (ref 39–117)
ALT SERPL W P-5'-P-CCNC: 13 U/L (ref 1–33)
ANION GAP SERPL CALCULATED.3IONS-SCNC: 8 MMOL/L (ref 5–15)
AST SERPL-CCNC: 16 U/L (ref 1–32)
BASOPHILS # BLD AUTO: 0.06 10*3/MM3 (ref 0–0.2)
BASOPHILS NFR BLD AUTO: 0.6 % (ref 0–1.5)
BILIRUB SERPL-MCNC: 0.3 MG/DL (ref 0–1.2)
BUN SERPL-MCNC: 7 MG/DL (ref 8–23)
BUN/CREAT SERPL: 10.1 (ref 7–25)
CALCIUM SPEC-SCNC: 9.6 MG/DL (ref 8.6–10.5)
CHLORIDE SERPL-SCNC: 103 MMOL/L (ref 98–107)
CHOLEST SERPL-MCNC: 157 MG/DL (ref 0–200)
CO2 SERPL-SCNC: 33 MMOL/L (ref 22–29)
CREAT SERPL-MCNC: 0.69 MG/DL (ref 0.57–1)
DEPRECATED RDW RBC AUTO: 39.7 FL (ref 37–54)
EGFRCR SERPLBLD CKD-EPI 2021: 99.5 ML/MIN/1.73
EOSINOPHIL # BLD AUTO: 0.18 10*3/MM3 (ref 0–0.4)
EOSINOPHIL NFR BLD AUTO: 1.9 % (ref 0.3–6.2)
ERYTHROCYTE [DISTWIDTH] IN BLOOD BY AUTOMATED COUNT: 11.9 % (ref 12.3–15.4)
GLOBULIN UR ELPH-MCNC: 2.6 GM/DL
GLUCOSE SERPL-MCNC: 100 MG/DL (ref 65–99)
HCT VFR BLD AUTO: 36.7 % (ref 34–46.6)
HDLC SERPL-MCNC: 61 MG/DL (ref 40–60)
HGB BLD-MCNC: 12 G/DL (ref 12–15.9)
IMM GRANULOCYTES # BLD AUTO: 0.02 10*3/MM3 (ref 0–0.05)
IMM GRANULOCYTES NFR BLD AUTO: 0.2 % (ref 0–0.5)
LDLC SERPL CALC-MCNC: 83 MG/DL (ref 0–100)
LDLC/HDLC SERPL: 1.36 {RATIO}
LYMPHOCYTES # BLD AUTO: 1.98 10*3/MM3 (ref 0.7–3.1)
LYMPHOCYTES NFR BLD AUTO: 20.6 % (ref 19.6–45.3)
MCH RBC QN AUTO: 29.1 PG (ref 26.6–33)
MCHC RBC AUTO-ENTMCNC: 32.7 G/DL (ref 31.5–35.7)
MCV RBC AUTO: 89.1 FL (ref 79–97)
MONOCYTES # BLD AUTO: 0.77 10*3/MM3 (ref 0.1–0.9)
MONOCYTES NFR BLD AUTO: 8 % (ref 5–12)
NEUTROPHILS NFR BLD AUTO: 6.6 10*3/MM3 (ref 1.7–7)
NEUTROPHILS NFR BLD AUTO: 68.7 % (ref 42.7–76)
NRBC BLD AUTO-RTO: 0 /100 WBC (ref 0–0.2)
PLATELET # BLD AUTO: 295 10*3/MM3 (ref 140–450)
PMV BLD AUTO: 11.4 FL (ref 6–12)
POTASSIUM SERPL-SCNC: 3.8 MMOL/L (ref 3.5–5.2)
PROT SERPL-MCNC: 6.6 G/DL (ref 6–8.5)
RBC # BLD AUTO: 4.12 10*6/MM3 (ref 3.77–5.28)
SODIUM SERPL-SCNC: 144 MMOL/L (ref 136–145)
T3FREE SERPL-MCNC: 2.97 PG/ML (ref 2–4.4)
T4 FREE SERPL-MCNC: 1.75 NG/DL (ref 0.93–1.7)
TRIGL SERPL-MCNC: 65 MG/DL (ref 0–150)
TSH SERPL DL<=0.05 MIU/L-ACNC: 0.41 UIU/ML (ref 0.27–4.2)
VLDLC SERPL-MCNC: 13 MG/DL (ref 5–40)
WBC NRBC COR # BLD: 9.61 10*3/MM3 (ref 3.4–10.8)

## 2023-06-15 PROCEDURE — 1159F MED LIST DOCD IN RCRD: CPT | Performed by: FAMILY MEDICINE

## 2023-06-15 PROCEDURE — 3078F DIAST BP <80 MM HG: CPT | Performed by: FAMILY MEDICINE

## 2023-06-15 PROCEDURE — 84481 FREE ASSAY (FT-3): CPT | Performed by: FAMILY MEDICINE

## 2023-06-15 PROCEDURE — 1160F RVW MEDS BY RX/DR IN RCRD: CPT | Performed by: FAMILY MEDICINE

## 2023-06-15 PROCEDURE — 80061 LIPID PANEL: CPT | Performed by: FAMILY MEDICINE

## 2023-06-15 PROCEDURE — 80050 GENERAL HEALTH PANEL: CPT | Performed by: FAMILY MEDICINE

## 2023-06-15 PROCEDURE — 3074F SYST BP LT 130 MM HG: CPT | Performed by: FAMILY MEDICINE

## 2023-06-15 PROCEDURE — 36415 COLL VENOUS BLD VENIPUNCTURE: CPT | Performed by: FAMILY MEDICINE

## 2023-06-15 PROCEDURE — 84439 ASSAY OF FREE THYROXINE: CPT | Performed by: FAMILY MEDICINE

## 2023-06-15 PROCEDURE — 99214 OFFICE O/P EST MOD 30 MIN: CPT | Performed by: FAMILY MEDICINE

## 2023-06-15 NOTE — PROGRESS NOTES
Subjective   Kirby Gunter is a 58 y.o. female.     History of Present Illness  Kirby Gunter is in for follow up on her high blood pressure and hypothyroidism. She recently was hospitalized with pneumonia and had to be put on oxygen. She has quit smoking as a result. There is no history of chest pain or new dyspnea. There is no history of issue with bowel or bladder dysfunction. There is no history of dizziness or lightheadedness. There is no history of issue with sleep or mood. There is no history of issue with present medication.  She is wanting to go back to work and feels ready.  They are comfortable with her coming back to work wearing her oxygen.  She works in a  so she is not around any kind of heavy equipment or an open flame.      Hypertension  Associated symptoms include headaches and shortness of breath. Pertinent negatives include no chest pain or neck pain.        /77 (BP Location: Left arm, Patient Position: Sitting, Cuff Size: Large Adult)   Pulse 92   Temp 97.5 °F (36.4 °C) (Temporal)   Wt 61.6 kg (135 lb 12.8 oz)   SpO2 95%   BMI 24.84 kg/m²       Chief Complaint   Patient presents with    Hypertension     3 month f/u & next visit Annual Exam            Current Outpatient Medications:     albuterol sulfate  (90 Base) MCG/ACT inhaler, Inhale 2 puffs Every 4 (Four) Hours As Needed for Wheezing or Shortness of Air., Disp: 18 g, Rfl: 0    amLODIPine (NORVASC) 5 MG tablet, Take 1 tablet by mouth Daily., Disp: 90 tablet, Rfl: 1    guaiFENesin (MUCINEX) 600 MG 12 hr tablet, Take 1 tablet by mouth Every 12 (Twelve) Hours., Disp: 14 tablet, Rfl: 0    levothyroxine (SYNTHROID, LEVOTHROID) 88 MCG tablet, TAKE ONE TABLET BY MOUTH DAILY, Disp: 30 tablet, Rfl: 3    lisinopril-hydrochlorothiazide (PRINZIDE,ZESTORETIC) 20-25 MG per tablet, TAKE ONE TABLET BY MOUTH DAILY, Disp: 30 tablet, Rfl: 3    methylPREDNISolone (MEDROL) 4 MG dose pack, Take as directed on package instructions.,  Disp: 21 tablet, Rfl: 0    nicotine (NICODERM CQ) 21 MG/24HR patch, Place 1 patch on the skin as directed by provider Daily., Disp: 30 patch, Rfl: 0    O2 (OXYGEN), Inhale 3 L/min Continuous., Disp: , Rfl:         The following portions of the patient's history were reviewed and updated as appropriate: allergies, current medications, past family history, past medical history, past social history, past surgical history, and problem list.    Review of Systems   Constitutional:  Negative for activity change, fatigue and fever.   HENT:  Positive for postnasal drip. Negative for congestion, ear pain, sinus pressure, sinus pain, sore throat and trouble swallowing.    Eyes:  Negative for visual disturbance.   Respiratory:  Positive for shortness of breath. Negative for chest tightness and wheezing.    Cardiovascular:  Negative for chest pain.   Gastrointestinal:  Negative for abdominal distention, abdominal pain, constipation, diarrhea, nausea and vomiting.   Genitourinary:  Negative for difficulty urinating and dysuria.   Musculoskeletal:  Negative for back pain and neck pain.   Skin:  Negative for rash.   Neurological:  Positive for headaches.   Psychiatric/Behavioral:  Negative for agitation, hallucinations and suicidal ideas.      Objective   Physical Exam  Vitals and nursing note reviewed.   Constitutional:       Appearance: Normal appearance.   Cardiovascular:      Rate and Rhythm: Normal rate and regular rhythm.      Heart sounds: Normal heart sounds. No murmur heard.  Pulmonary:      Effort: Pulmonary effort is normal.      Breath sounds: No wheezing or rales.   Abdominal:      General: Bowel sounds are normal.      Palpations: Abdomen is soft.      Tenderness: There is no abdominal tenderness. There is no guarding.   Musculoskeletal:      Cervical back: Neck supple.   Lymphadenopathy:      Cervical: No cervical adenopathy.   Neurological:      General: No focal deficit present.      Mental Status: She is alert and  oriented to person, place, and time.   Psychiatric:         Mood and Affect: Mood normal.         Assessment & Plan   Problems Addressed this Visit          Cardiac and Vasculature    Essential hypertension - Primary    Relevant Orders    Comprehensive metabolic panel    Lipid panel       Endocrine and Metabolic    Hypothyroidism    Relevant Orders    T3, free    T4, free    TSH    CBC w AUTO Differential     Other Visit Diagnoses       Shortness of breath              Diagnoses         Codes Comments    Essential hypertension    -  Primary ICD-10-CM: I10  ICD-9-CM: 401.9     Acquired hypothyroidism     ICD-10-CM: E03.9  ICD-9-CM: 244.9     Shortness of breath     ICD-10-CM: R06.02  ICD-9-CM: 786.05           I will have her continue to wear the oxygen for the time being  I will clear her to go back to work as long as she can wear the oxygen  I will have her follow-up with us in about 4 to 6 weeks and we can revisit at that time whether or not she still needs the oxygen moving forward  I have asked her to call me with any new concerns  I have asked her to stay on the current medication plan

## 2023-06-15 NOTE — LETTER
Milka 15, 2023     Patient: Kirby Gunter   YOB: 1965   Date of Visit: 6/15/2023       To Whom It May Concern:    It is my medical opinion that Kirby Gunter may return to work on Friday June 16, 2023 but she must wear her oxygen while at work .           Sincerely,        Chava López MD    CC: No Recipients

## 2023-06-17 ENCOUNTER — PATIENT MESSAGE (OUTPATIENT)
Dept: FAMILY MEDICINE CLINIC | Facility: CLINIC | Age: 58
End: 2023-06-17

## 2023-06-17 DIAGNOSIS — E03.9 ACQUIRED HYPOTHYROIDISM: ICD-10-CM

## 2023-06-17 RX ORDER — LEVOTHYROXINE SODIUM 0.07 MG/1
75 TABLET ORAL DAILY
Qty: 30 TABLET | Refills: 3 | Status: SHIPPED | OUTPATIENT
Start: 2023-06-17

## 2023-07-27 ENCOUNTER — OFFICE VISIT (OUTPATIENT)
Dept: FAMILY MEDICINE CLINIC | Facility: CLINIC | Age: 58
End: 2023-07-27
Payer: MEDICAID

## 2023-07-27 VITALS
OXYGEN SATURATION: 95 % | DIASTOLIC BLOOD PRESSURE: 72 MMHG | TEMPERATURE: 98 F | WEIGHT: 140 LBS | HEART RATE: 88 BPM | SYSTOLIC BLOOD PRESSURE: 118 MMHG | BODY MASS INDEX: 25.61 KG/M2

## 2023-07-27 DIAGNOSIS — B97.81 BRONCHITIS DUE TO HUMAN METAPNEUMOVIRUS (HMPV): Primary | ICD-10-CM

## 2023-07-27 DIAGNOSIS — R06.02 SHORTNESS OF BREATH: ICD-10-CM

## 2023-07-27 DIAGNOSIS — J40 BRONCHITIS DUE TO HUMAN METAPNEUMOVIRUS (HMPV): Primary | ICD-10-CM

## 2023-07-27 DIAGNOSIS — F17.211 CIGARETTE NICOTINE DEPENDENCE IN REMISSION: ICD-10-CM

## 2023-07-27 PROCEDURE — 99213 OFFICE O/P EST LOW 20 MIN: CPT

## 2023-07-27 PROCEDURE — 3074F SYST BP LT 130 MM HG: CPT

## 2023-07-27 PROCEDURE — 1160F RVW MEDS BY RX/DR IN RCRD: CPT

## 2023-07-27 PROCEDURE — 1159F MED LIST DOCD IN RCRD: CPT

## 2023-07-27 PROCEDURE — 3078F DIAST BP <80 MM HG: CPT

## 2023-07-27 NOTE — PROGRESS NOTES
"Chief Complaint  Shortness of Breath and Follow-up    Subjective        Kirby Guntre presents to [facility]  History of Present Illness    Pt is here today following up on bronchitis that she was hospitalized for. She is feeling much better. She is not on oxygen during the day anymore. Her energy is good. She is eating well. She is back to work. She feels much improved. She has not smoked since getting sick. She denies any complaints, cough, fever, chills, malaise.    Objective   Vital Signs:  /72 (BP Location: Left arm, Patient Position: Sitting, Cuff Size: Adult)   Pulse 88   Temp 98 °F (36.7 °C) (Oral)   Wt 63.5 kg (140 lb)   SpO2 95%   BMI 25.61 kg/m²   Estimated body mass index is 25.61 kg/m² as calculated from the following:    Height as of 6/1/23: 157.5 cm (62\").    Weight as of this encounter: 63.5 kg (140 lb).                Physical Exam  Vitals reviewed.   Constitutional:       General: She is not in acute distress.     Appearance: Normal appearance. She is not ill-appearing, toxic-appearing or diaphoretic.   Cardiovascular:      Rate and Rhythm: Normal rate and regular rhythm.      Pulses: Normal pulses.      Heart sounds: Normal heart sounds.   Pulmonary:      Effort: Pulmonary effort is normal. No respiratory distress.      Breath sounds: Decreased breath sounds present.   Skin:     General: Skin is warm and dry.      Capillary Refill: Capillary refill takes less than 2 seconds.   Neurological:      General: No focal deficit present.      Mental Status: She is alert and oriented to person, place, and time.   Psychiatric:         Mood and Affect: Mood normal.         Behavior: Behavior normal.         Thought Content: Thought content normal.         Judgment: Judgment normal.      Result Review :                Assessment and Plan   Diagnoses and all orders for this visit:    1. Bronchitis due to human metapneumovirus (hMPV) (Primary)    2. Shortness of " breath    3. Cigarette nicotine dependence in remission  -     nicotine (NICODERM CQ) 7 MG/24HR patch; Place 1 patch on the skin as directed by provider Daily.  Dispense: 7 each; Refill: 1             Follow Up   Return in 6 months (on 1/27/2024) for With Ashwin.  Patient was given instructions and counseling regarding her condition or for health maintenance advice. Please see specific information pulled into the AVS if appropriate.

## 2023-08-30 RX ORDER — AMLODIPINE BESYLATE 5 MG/1
TABLET ORAL
Qty: 90 TABLET | Refills: 1 | Status: SHIPPED | OUTPATIENT
Start: 2023-08-30

## 2023-10-09 RX ORDER — LISINOPRIL AND HYDROCHLOROTHIAZIDE 25; 20 MG/1; MG/1
1 TABLET ORAL DAILY
Qty: 30 TABLET | Refills: 3 | Status: SHIPPED | OUTPATIENT
Start: 2023-10-09

## 2023-10-09 NOTE — TELEPHONE ENCOUNTER
Caller: Jani Hankamer C    Relationship: Self    Best call back number: [phone]     Requested Prescriptions:   Requested Prescriptions     Pending Prescriptions Disp Refills    lisinopril-hydrochlorothiazide (PRINZIDE,ZESTORETIC) 20-25 MG per tablet 30 tablet 3     Sig: Take 1 tablet by mouth Daily.        Pharmacy where request should be sent: [id number] [address] [facility] - [phone] [facility] [phone]      Last office visit with prescribing clinician: 6/15/2023   Last telemedicine visit with prescribing clinician: Visit date not found   Next office visit with prescribing clinician: 1/30/2024     Additional details provided by patient:     Does the patient have less than a 3 day supply:  [x] Yes  [] No    Would you like a call back once the refill request has been completed: [x] Yes [] No    If the office needs to give you a call back, can they leave a voicemail: [x] Yes [] No    Jaqui Khan Rep   10/09/23 16:12 EDT

## 2023-11-06 DIAGNOSIS — E03.9 ACQUIRED HYPOTHYROIDISM: ICD-10-CM

## 2023-11-07 RX ORDER — LEVOTHYROXINE SODIUM 0.07 MG/1
75 TABLET ORAL DAILY
Qty: 30 TABLET | Refills: 0 | Status: SHIPPED | OUTPATIENT
Start: 2023-11-07

## 2023-11-30 RX ORDER — AMLODIPINE BESYLATE 5 MG/1
5 TABLET ORAL DAILY
Qty: 90 TABLET | Refills: 1 | Status: SHIPPED | OUTPATIENT
Start: 2023-11-30

## 2023-11-30 NOTE — TELEPHONE ENCOUNTER
Caller: VivimisaKirby    Relationship: Self    Best call back number: [phone]    Requested Prescriptions:   Requested Prescriptions     Pending Prescriptions Disp Refills    amLODIPine (NORVASC) 5 MG tablet 90 tablet 1     Sig: Take 1 tablet by mouth Daily.        Pharmacy where request should be sent: [id number] [address] - [phone]  - [phone]     Last office visit with prescribing clinician: 6/15/2023   Last telemedicine visit with prescribing clinician: Visit date not found   Next office visit with prescribing clinician: 1/30/2024     Does the patient have less than a 3 day supply:  [x] Yes  [] No    Would you like a call back once the refill request has been completed: [x] Yes [] No    If the office needs to give you a call back, can they leave a voicemail: [x] Yes [] No    Jaqui De Anda Rep   11/30/23 12:22 EST

## 2023-12-03 DIAGNOSIS — E03.9 ACQUIRED HYPOTHYROIDISM: ICD-10-CM

## 2023-12-03 RX ORDER — LEVOTHYROXINE SODIUM 0.07 MG/1
75 TABLET ORAL DAILY
Qty: 30 TABLET | Refills: 1 | Status: SHIPPED | OUTPATIENT
Start: 2023-12-03

## 2024-01-30 ENCOUNTER — OFFICE VISIT (OUTPATIENT)
Dept: FAMILY MEDICINE CLINIC | Facility: CLINIC | Age: 59
End: 2024-01-30
Payer: MEDICAID

## 2024-01-30 ENCOUNTER — LAB (OUTPATIENT)
Dept: FAMILY MEDICINE CLINIC | Facility: CLINIC | Age: 59
End: 2024-01-30
Payer: MEDICAID

## 2024-01-30 VITALS
DIASTOLIC BLOOD PRESSURE: 73 MMHG | OXYGEN SATURATION: 96 % | BODY MASS INDEX: 27.36 KG/M2 | SYSTOLIC BLOOD PRESSURE: 117 MMHG | TEMPERATURE: 97.8 F | HEART RATE: 72 BPM | WEIGHT: 149.6 LBS

## 2024-01-30 DIAGNOSIS — I10 ESSENTIAL HYPERTENSION: Primary | ICD-10-CM

## 2024-01-30 DIAGNOSIS — E03.9 ACQUIRED HYPOTHYROIDISM: ICD-10-CM

## 2024-01-30 DIAGNOSIS — R10.9 ABDOMINAL PAIN, ACUTE: ICD-10-CM

## 2024-01-30 LAB
25(OH)D3 SERPL-MCNC: 16.2 NG/ML (ref 30–100)
ALBUMIN SERPL-MCNC: 4.6 G/DL (ref 3.5–5.2)
ALBUMIN/GLOB SERPL: 1.8 G/DL
ALP SERPL-CCNC: 60 U/L (ref 39–117)
ALT SERPL W P-5'-P-CCNC: 20 U/L (ref 1–33)
ANION GAP SERPL CALCULATED.3IONS-SCNC: 8 MMOL/L (ref 5–15)
AST SERPL-CCNC: 18 U/L (ref 1–32)
BASOPHILS # BLD AUTO: 0.06 10*3/MM3 (ref 0–0.2)
BASOPHILS NFR BLD AUTO: 0.5 % (ref 0–1.5)
BILIRUB SERPL-MCNC: 0.3 MG/DL (ref 0–1.2)
BUN SERPL-MCNC: 15 MG/DL (ref 8–23)
BUN/CREAT SERPL: 22.4 (ref 7–25)
CALCIUM SPEC-SCNC: 9.9 MG/DL (ref 8.6–10.5)
CHLORIDE SERPL-SCNC: 101 MMOL/L (ref 98–107)
CHOLEST SERPL-MCNC: 173 MG/DL (ref 0–200)
CO2 SERPL-SCNC: 31 MMOL/L (ref 22–29)
CREAT SERPL-MCNC: 0.67 MG/DL (ref 0.57–1)
DEPRECATED RDW RBC AUTO: 39.4 FL (ref 37–54)
EGFRCR SERPLBLD CKD-EPI 2021: 100.2 ML/MIN/1.73
EOSINOPHIL # BLD AUTO: 0.14 10*3/MM3 (ref 0–0.4)
EOSINOPHIL NFR BLD AUTO: 1.3 % (ref 0.3–6.2)
ERYTHROCYTE [DISTWIDTH] IN BLOOD BY AUTOMATED COUNT: 12 % (ref 12.3–15.4)
GLOBULIN UR ELPH-MCNC: 2.5 GM/DL
GLUCOSE SERPL-MCNC: 114 MG/DL (ref 65–99)
HCT VFR BLD AUTO: 39 % (ref 34–46.6)
HDLC SERPL-MCNC: 61 MG/DL (ref 40–60)
HGB BLD-MCNC: 12.5 G/DL (ref 12–15.9)
IMM GRANULOCYTES # BLD AUTO: 0.04 10*3/MM3 (ref 0–0.05)
IMM GRANULOCYTES NFR BLD AUTO: 0.4 % (ref 0–0.5)
LDLC SERPL CALC-MCNC: 101 MG/DL (ref 0–100)
LDLC/HDLC SERPL: 1.66 {RATIO}
LYMPHOCYTES # BLD AUTO: 2 10*3/MM3 (ref 0.7–3.1)
LYMPHOCYTES NFR BLD AUTO: 18.2 % (ref 19.6–45.3)
MCH RBC QN AUTO: 29.1 PG (ref 26.6–33)
MCHC RBC AUTO-ENTMCNC: 32.1 G/DL (ref 31.5–35.7)
MCV RBC AUTO: 90.9 FL (ref 79–97)
MONOCYTES # BLD AUTO: 0.54 10*3/MM3 (ref 0.1–0.9)
MONOCYTES NFR BLD AUTO: 4.9 % (ref 5–12)
NEUTROPHILS NFR BLD AUTO: 74.7 % (ref 42.7–76)
NEUTROPHILS NFR BLD AUTO: 8.19 10*3/MM3 (ref 1.7–7)
NRBC BLD AUTO-RTO: 0 /100 WBC (ref 0–0.2)
PLATELET # BLD AUTO: 299 10*3/MM3 (ref 140–450)
PMV BLD AUTO: 11.4 FL (ref 6–12)
POTASSIUM SERPL-SCNC: 4.2 MMOL/L (ref 3.5–5.2)
PROT SERPL-MCNC: 7.1 G/DL (ref 6–8.5)
RBC # BLD AUTO: 4.29 10*6/MM3 (ref 3.77–5.28)
SODIUM SERPL-SCNC: 140 MMOL/L (ref 136–145)
T3FREE SERPL-MCNC: 2.32 PG/ML (ref 2–4.4)
T4 FREE SERPL-MCNC: 1.22 NG/DL (ref 0.93–1.7)
TRIGL SERPL-MCNC: 55 MG/DL (ref 0–150)
TSH SERPL DL<=0.05 MIU/L-ACNC: 4.78 UIU/ML (ref 0.27–4.2)
VLDLC SERPL-MCNC: 11 MG/DL (ref 5–40)
WBC NRBC COR # BLD AUTO: 10.97 10*3/MM3 (ref 3.4–10.8)

## 2024-01-30 PROCEDURE — 3074F SYST BP LT 130 MM HG: CPT | Performed by: FAMILY MEDICINE

## 2024-01-30 PROCEDURE — 84481 FREE ASSAY (FT-3): CPT | Performed by: FAMILY MEDICINE

## 2024-01-30 PROCEDURE — 84439 ASSAY OF FREE THYROXINE: CPT | Performed by: FAMILY MEDICINE

## 2024-01-30 PROCEDURE — 80050 GENERAL HEALTH PANEL: CPT | Performed by: FAMILY MEDICINE

## 2024-01-30 PROCEDURE — 99214 OFFICE O/P EST MOD 30 MIN: CPT | Performed by: FAMILY MEDICINE

## 2024-01-30 PROCEDURE — 80061 LIPID PANEL: CPT | Performed by: FAMILY MEDICINE

## 2024-01-30 PROCEDURE — 3078F DIAST BP <80 MM HG: CPT | Performed by: FAMILY MEDICINE

## 2024-01-30 PROCEDURE — 36415 COLL VENOUS BLD VENIPUNCTURE: CPT | Performed by: FAMILY MEDICINE

## 2024-01-30 PROCEDURE — 82306 VITAMIN D 25 HYDROXY: CPT | Performed by: FAMILY MEDICINE

## 2024-01-30 RX ORDER — DICYCLOMINE HYDROCHLORIDE 10 MG/1
10 CAPSULE ORAL 4 TIMES DAILY PRN
Qty: 120 CAPSULE | Refills: 3 | Status: SHIPPED | OUTPATIENT
Start: 2024-01-30

## 2024-01-30 RX ORDER — AMLODIPINE BESYLATE 5 MG/1
5 TABLET ORAL DAILY
Qty: 90 TABLET | Refills: 1 | Status: SHIPPED | OUTPATIENT
Start: 2024-01-30

## 2024-01-30 NOTE — PROGRESS NOTES
Subjective   Kirby Gunter is a 58 y.o. female.     History of Present Illness  Kirby Gunter is in for follow up on her high blood pressure and hypothyroidism. There is no history of chest pain or dyspnea. There is no history of issue with bladder dysfunction. There is no history of dizziness or lightheadedness. There is no history of issue with sleep or mood. There is no history of issue with present medication. She has had some increased episodes of  abdominal cramping and loose stools for much of the last month. Nothing has changed in her diet but she is under much more stress in her life.       Hypertension  Associated symptoms include headaches. Pertinent negatives include no chest pain, neck pain or shortness of breath.          /73 (BP Location: Left arm, Patient Position: Sitting, Cuff Size: Large Adult)   Pulse 72   Temp 97.8 °F (36.6 °C) (Temporal)   Wt 67.9 kg (149 lb 9.6 oz)   SpO2 96%   BMI 27.36 kg/m²       Chief Complaint   Patient presents with    Hypertension     6 month f/u & fasting for labs            Current Outpatient Medications:     albuterol sulfate  (90 Base) MCG/ACT inhaler, Inhale 2 puffs Every 4 (Four) Hours As Needed for Wheezing or Shortness of Air., Disp: 18 g, Rfl: 0    amLODIPine (NORVASC) 5 MG tablet, Take 1 tablet by mouth Daily., Disp: 90 tablet, Rfl: 1    levothyroxine (SYNTHROID, LEVOTHROID) 75 MCG tablet, TAKE 1 TABLET BY MOUTH EVERY DAY, Disp: 30 tablet, Rfl: 1    lisinopril-hydrochlorothiazide (PRINZIDE,ZESTORETIC) 20-25 MG per tablet, Take 1 tablet by mouth Daily., Disp: 30 tablet, Rfl: 3    O2 (OXYGEN), Inhale 3 L/min Continuous., Disp: , Rfl:     dicyclomine (BENTYL) 10 MG capsule, Take 1 capsule by mouth 4 (Four) Times a Day As Needed for Abdominal Cramping., Disp: 120 capsule, Rfl: 3    BMI is >= 25 and <30. (Overweight) The following options were offered after discussion;: nutrition counseling/recommendations and pharmacological intervention  options       The following portions of the patient's history were reviewed and updated as appropriate: allergies, current medications, past family history, past medical history, past social history, past surgical history, and problem list.    Review of Systems   Constitutional:  Negative for activity change, fatigue and fever.   HENT:  Negative for congestion, ear pain, postnasal drip, sinus pressure, sinus pain, sore throat and trouble swallowing.    Eyes:  Negative for visual disturbance.   Respiratory:  Negative for chest tightness, shortness of breath and wheezing.    Cardiovascular:  Negative for chest pain.   Gastrointestinal:  Positive for abdominal pain and diarrhea. Negative for abdominal distention, constipation, nausea and vomiting.   Genitourinary:  Negative for difficulty urinating and dysuria.   Musculoskeletal:  Negative for back pain and neck pain.   Skin:  Negative for rash.   Neurological:  Positive for headaches.   Psychiatric/Behavioral:  Negative for agitation, hallucinations and suicidal ideas.        Objective   Physical Exam  Vitals and nursing note reviewed.   Constitutional:       Appearance: Normal appearance.   HENT:      Right Ear: Tympanic membrane and ear canal normal.      Left Ear: Tympanic membrane and ear canal normal.   Cardiovascular:      Rate and Rhythm: Normal rate and regular rhythm.      Heart sounds: Normal heart sounds. No murmur heard.  Pulmonary:      Effort: Pulmonary effort is normal.      Breath sounds: No wheezing or rales.   Abdominal:      General: Bowel sounds are normal.      Palpations: Abdomen is soft.      Tenderness: There is no abdominal tenderness. There is no guarding.   Musculoskeletal:      Cervical back: Neck supple.      Right lower leg: No edema.      Left lower leg: No edema.   Lymphadenopathy:      Cervical: No cervical adenopathy.   Neurological:      Mental Status: She is alert and oriented to person, place, and time. Mental status is at  baseline.   Psychiatric:         Mood and Affect: Mood normal.           Assessment & Plan   Problems Addressed this Visit          Cardiac and Vasculature    Essential hypertension - Primary    Relevant Medications    amLODIPine (NORVASC) 5 MG tablet    Other Relevant Orders    Comprehensive metabolic panel    Lipid panel       Endocrine and Metabolic    Hypothyroidism    Relevant Orders    Vitamin D 25 hydroxy    CBC w AUTO Differential    T4, free    T3, free    TSH     Other Visit Diagnoses       Abdominal pain, acute              Diagnoses         Codes Comments    Essential hypertension    -  Primary ICD-10-CM: I10  ICD-9-CM: 401.9     Acquired hypothyroidism     ICD-10-CM: E03.9  ICD-9-CM: 244.9     Abdominal pain, acute     ICD-10-CM: R10.9  ICD-9-CM: 789.00, 338.19           I do think the abdominal issues are probably stress related and I will treat as such with some dicyclomine and probiotics  I have asked her to keep me in the loop on how she responds  I plan to update some labs today and adjust her other medication issues as indicated  I have asked her to call with response and see me again in 6 months         Answers submitted by the patient for this visit:  Other (Submitted on 1/23/2024)  Please describe your symptoms.: Follow up appt  Have you had these symptoms before?: No  How long have you been having these symptoms?: Greater than 2 weeks  Please list any medications you are currently taking for this condition.: N/A  Primary Reason for Visit (Submitted on 1/23/2024)  What is the primary reason for your visit?: Other

## 2024-01-31 ENCOUNTER — PATIENT MESSAGE (OUTPATIENT)
Dept: FAMILY MEDICINE CLINIC | Facility: CLINIC | Age: 59
End: 2024-01-31

## 2024-02-02 DIAGNOSIS — E03.9 ACQUIRED HYPOTHYROIDISM: ICD-10-CM

## 2024-02-02 RX ORDER — ERGOCALCIFEROL 1.25 MG/1
50000 CAPSULE ORAL WEEKLY
Qty: 4 CAPSULE | Refills: 5 | Status: SHIPPED | OUTPATIENT
Start: 2024-02-02

## 2024-02-02 RX ORDER — LEVOTHYROXINE SODIUM 88 UG/1
88 TABLET ORAL DAILY
Qty: 90 TABLET | Refills: 1 | Status: SHIPPED | OUTPATIENT
Start: 2024-02-02

## 2024-02-06 NOTE — PROGRESS NOTES
"Chief Complaint  Abdominal pain    Subjective        Kirby Gunter presents to [facility]  History of Present Illness  Kirby is a 58 year old female presenting today with left-sided abdominal pain. Patient reports that she had vomiting and diarrhea on 1/17/24. Symptoms started to improve four days later. She had an appointment with us on 1/30/24 where she was prescribed a probiotic and bentyl. Her symptoms gradually improved, but she was still having abdominal tenderness. This morning she woke up and had two normal bowel movements, cramping is completely gone, and the area of tenderness has greatly decreased in size. Denies any recent fever or chills. Denies any blood in stool. No history of diverticulitis.     She also needs a refill on her levothyroxine 88 mcg daily since her dose was recently increased.      The following portions of the patient's history were reviewed and updated as appropriate: allergies, current medications, past family history, past medical history, past social history, past surgical history and problem list.    No Known Allergies    Patient Active Problem List   Diagnosis    Essential hypertension    Hypothyroidism    Bronchitis due to human metapneumovirus (hMPV)       Current Outpatient Medications   Medication Instructions    albuterol sulfate  (90 Base) MCG/ACT inhaler 2 puffs, Inhalation, Every 4 Hours PRN    amLODIPine (NORVASC) 5 mg, Oral, Daily    dicyclomine (BENTYL) 10 mg, Oral, 4 Times Daily PRN    levothyroxine (SYNTHROID, LEVOTHROID) 88 mcg, Oral, Daily    lisinopril-hydrochlorothiazide (PRINZIDE,ZESTORETIC) 20-25 MG per tablet 1 tablet, Oral, Daily    O2 (OXYGEN) 3 L/min, Inhalation, Continuous    vitamin D (ERGOCALCIFEROL) 50,000 Units, Oral, Weekly          Objective   Vital Signs:  /77 (BP Location: Left arm, Patient Position: Sitting, Cuff Size: Large Adult)   Pulse 75   Temp 98.2 °F (36.8 °C) (Temporal)   Ht 157.5 cm (62\") " "  Wt 67.1 kg (148 lb)   SpO2 97%   BMI 27.07 kg/m²   Estimated body mass index is 27.07 kg/m² as calculated from the following:    Height as of this encounter: 157.5 cm (62\").    Weight as of this encounter: 67.1 kg (148 lb).               Review of Systems     Physical Exam  Constitutional:       Appearance: Normal appearance.   Cardiovascular:      Rate and Rhythm: Normal rate and regular rhythm.   Pulmonary:      Effort: Pulmonary effort is normal.   Abdominal:      General: Abdomen is flat. Bowel sounds are increased.      Palpations: Abdomen is soft.      Tenderness: There is abdominal tenderness.       Neurological:      Mental Status: She is alert and oriented to person, place, and time.   Psychiatric:         Mood and Affect: Mood normal.         Behavior: Behavior normal.         Thought Content: Thought content normal.         Judgment: Judgment normal.        Result Review :                   Assessment and Plan   Diagnoses and all orders for this visit:    1. LLQ abdominal pain (Primary)  Comments:  Improving.  Cont to monitor  Cont probiotics and bentyl  call if symptoms become worse    2. Acquired hypothyroidism  -     levothyroxine (SYNTHROID, LEVOTHROID) 88 MCG tablet; Take 1 tablet by mouth Daily.  Dispense: 90 tablet; Refill: 1    Other orders  -     lisinopril-hydrochlorothiazide (PRINZIDE,ZESTORETIC) 20-25 MG per tablet; Take 1 tablet by mouth Daily.  Dispense: 90 tablet; Refill: 1             Follow Up   No follow-ups on file.  Patient was given instructions and counseling regarding her condition or for health maintenance advice. Please see specific information pulled into the AVS if appropriate.       "

## 2024-02-07 ENCOUNTER — OFFICE VISIT (OUTPATIENT)
Dept: FAMILY MEDICINE CLINIC | Facility: CLINIC | Age: 59
End: 2024-02-07
Payer: MEDICAID

## 2024-02-07 VITALS
WEIGHT: 148 LBS | HEIGHT: 62 IN | OXYGEN SATURATION: 97 % | HEART RATE: 75 BPM | BODY MASS INDEX: 27.23 KG/M2 | TEMPERATURE: 98.2 F | SYSTOLIC BLOOD PRESSURE: 125 MMHG | DIASTOLIC BLOOD PRESSURE: 77 MMHG

## 2024-02-07 DIAGNOSIS — R10.32 LLQ ABDOMINAL PAIN: Primary | ICD-10-CM

## 2024-02-07 DIAGNOSIS — E03.9 ACQUIRED HYPOTHYROIDISM: ICD-10-CM

## 2024-02-07 PROCEDURE — 99213 OFFICE O/P EST LOW 20 MIN: CPT | Performed by: NURSE PRACTITIONER

## 2024-02-07 PROCEDURE — 3074F SYST BP LT 130 MM HG: CPT | Performed by: NURSE PRACTITIONER

## 2024-02-07 PROCEDURE — 3078F DIAST BP <80 MM HG: CPT | Performed by: NURSE PRACTITIONER

## 2024-02-07 RX ORDER — LEVOTHYROXINE SODIUM 88 UG/1
88 TABLET ORAL DAILY
Qty: 90 TABLET | Refills: 1 | Status: SHIPPED | OUTPATIENT
Start: 2024-02-07

## 2024-02-07 RX ORDER — LISINOPRIL AND HYDROCHLOROTHIAZIDE 25; 20 MG/1; MG/1
1 TABLET ORAL DAILY
Qty: 90 TABLET | Refills: 1 | Status: SHIPPED | OUTPATIENT
Start: 2024-02-07

## 2024-02-10 ENCOUNTER — TELEPHONE (OUTPATIENT)
Dept: URGENT CARE | Facility: CLINIC | Age: 59
End: 2024-02-10
Payer: MEDICAID

## 2024-02-10 DIAGNOSIS — J06.9 URI WITH COUGH AND CONGESTION: Primary | ICD-10-CM

## 2024-02-10 RX ORDER — DEXTROMETHORPHAN HYDROBROMIDE AND PROMETHAZINE HYDROCHLORIDE 15; 6.25 MG/5ML; MG/5ML
5 SYRUP ORAL NIGHTLY PRN
Qty: 118 ML | Refills: 0 | Status: SHIPPED | OUTPATIENT
Start: 2024-02-10

## 2024-02-10 RX ORDER — BENZONATATE 100 MG/1
CAPSULE ORAL
Qty: 60 CAPSULE | Refills: 0 | Status: SHIPPED | OUTPATIENT
Start: 2024-02-10

## 2024-02-10 NOTE — TELEPHONE ENCOUNTER
Patient seen in the clinic yesterday and diagnosed with bronchitis. She was told that she would get a cough syrup for sx, but nothing was sent to the pharmacy. She did get the prednisone and the inhaler, but she really needs something for the cough.     Meds sent.  Caution side effects.

## 2024-02-29 RX ORDER — AMLODIPINE BESYLATE 5 MG/1
5 TABLET ORAL DAILY
Qty: 90 TABLET | Refills: 1 | Status: SHIPPED | OUTPATIENT
Start: 2024-02-29

## 2024-04-03 ENCOUNTER — APPOINTMENT (OUTPATIENT)
Dept: CT IMAGING | Facility: HOSPITAL | Age: 59
End: 2024-04-03
Payer: MEDICAID

## 2024-04-03 ENCOUNTER — HOSPITAL ENCOUNTER (INPATIENT)
Facility: HOSPITAL | Age: 59
LOS: 6 days | Discharge: HOME | End: 2024-04-09
Attending: EMERGENCY MEDICINE | Admitting: INTERNAL MEDICINE
Payer: MEDICAID

## 2024-04-03 DIAGNOSIS — K52.9 COLITIS WITH ABSCESS: ICD-10-CM

## 2024-04-03 DIAGNOSIS — K52.9 COLITIS: ICD-10-CM

## 2024-04-03 DIAGNOSIS — R10.84 GENERALIZED ABDOMINAL PAIN: Primary | ICD-10-CM

## 2024-04-03 DIAGNOSIS — K63.0 COLITIS WITH ABSCESS: ICD-10-CM

## 2024-04-03 PROBLEM — D72.829 LEUKOCYTOSIS: Status: ACTIVE | Noted: 2024-04-03

## 2024-04-03 PROBLEM — E87.6 HYPOKALEMIA: Status: ACTIVE | Noted: 2024-04-03

## 2024-04-03 PROBLEM — N28.9 ACUTE RENAL INSUFFICIENCY: Status: ACTIVE | Noted: 2024-04-03

## 2024-04-03 PROBLEM — R79.89 ELEVATED LFTS: Status: ACTIVE | Noted: 2024-04-03

## 2024-04-03 LAB
ALBUMIN SERPL-MCNC: 4 G/DL (ref 3.5–5.2)
ALBUMIN/GLOB SERPL: 1.2 G/DL
ALP SERPL-CCNC: 135 U/L (ref 39–117)
ALT SERPL W P-5'-P-CCNC: 80 U/L (ref 1–33)
ANION GAP SERPL CALCULATED.3IONS-SCNC: 13 MMOL/L (ref 5–15)
AST SERPL-CCNC: 53 U/L (ref 1–32)
BACTERIA UR QL AUTO: ABNORMAL /HPF
BASOPHILS # BLD AUTO: 0.1 10*3/MM3 (ref 0–0.2)
BASOPHILS NFR BLD AUTO: 0.3 % (ref 0–1.5)
BILIRUB SERPL-MCNC: 1.2 MG/DL (ref 0–1.2)
BILIRUB UR QL STRIP: NEGATIVE
BUN SERPL-MCNC: 15 MG/DL (ref 8–23)
BUN/CREAT SERPL: 14.2 (ref 7–25)
CALCIUM SPEC-SCNC: 9.2 MG/DL (ref 8.6–10.5)
CHLORIDE SERPL-SCNC: 90 MMOL/L (ref 98–107)
CLARITY UR: ABNORMAL
CO2 SERPL-SCNC: 30 MMOL/L (ref 22–29)
COLOR UR: YELLOW
CREAT SERPL-MCNC: 1.06 MG/DL (ref 0.57–1)
DEPRECATED RDW RBC AUTO: 41.4 FL (ref 37–54)
EGFRCR SERPLBLD CKD-EPI 2021: 59.9 ML/MIN/1.73
EOSINOPHIL # BLD AUTO: 0.04 10*3/MM3 (ref 0–0.4)
EOSINOPHIL NFR BLD AUTO: 0.1 % (ref 0.3–6.2)
ERYTHROCYTE [DISTWIDTH] IN BLOOD BY AUTOMATED COUNT: 12.5 % (ref 12.3–15.4)
GLOBULIN UR ELPH-MCNC: 3.3 GM/DL
GLUCOSE SERPL-MCNC: 131 MG/DL (ref 65–99)
GLUCOSE UR STRIP-MCNC: NEGATIVE MG/DL
HCT VFR BLD AUTO: 35.6 % (ref 34–46.6)
HGB BLD-MCNC: 11.6 G/DL (ref 12–15.9)
HGB UR QL STRIP.AUTO: ABNORMAL
HYALINE CASTS UR QL AUTO: ABNORMAL /LPF
IMM GRANULOCYTES # BLD AUTO: 0.29 10*3/MM3 (ref 0–0.05)
IMM GRANULOCYTES NFR BLD AUTO: 0.9 % (ref 0–0.5)
KETONES UR QL STRIP: ABNORMAL
LEUKOCYTE ESTERASE UR QL STRIP.AUTO: ABNORMAL
LYMPHOCYTES # BLD AUTO: 1.54 10*3/MM3 (ref 0.7–3.1)
LYMPHOCYTES NFR BLD AUTO: 4.7 % (ref 19.6–45.3)
MCH RBC QN AUTO: 29.7 PG (ref 26.6–33)
MCHC RBC AUTO-ENTMCNC: 32.6 G/DL (ref 31.5–35.7)
MCV RBC AUTO: 91 FL (ref 79–97)
MONOCYTES # BLD AUTO: 1.79 10*3/MM3 (ref 0.1–0.9)
MONOCYTES NFR BLD AUTO: 5.4 % (ref 5–12)
NEUTROPHILS NFR BLD AUTO: 29.14 10*3/MM3 (ref 1.7–7)
NEUTROPHILS NFR BLD AUTO: 88.6 % (ref 42.7–76)
NITRITE UR QL STRIP: NEGATIVE
NRBC BLD AUTO-RTO: 0 /100 WBC (ref 0–0.2)
PH UR STRIP.AUTO: 6 [PH] (ref 5–8)
PLATELET # BLD AUTO: 364 10*3/MM3 (ref 140–450)
PMV BLD AUTO: 10.6 FL (ref 6–12)
POTASSIUM SERPL-SCNC: 3.1 MMOL/L (ref 3.5–5.2)
PROT SERPL-MCNC: 7.3 G/DL (ref 6–8.5)
PROT UR QL STRIP: ABNORMAL
RBC # BLD AUTO: 3.91 10*6/MM3 (ref 3.77–5.28)
RBC # UR STRIP: ABNORMAL /HPF
REF LAB TEST METHOD: ABNORMAL
SODIUM SERPL-SCNC: 133 MMOL/L (ref 136–145)
SP GR UR STRIP: 1.01 (ref 1–1.03)
SQUAMOUS #/AREA URNS HPF: ABNORMAL /HPF
UROBILINOGEN UR QL STRIP: ABNORMAL
WBC # UR STRIP: ABNORMAL /HPF
WBC NRBC COR # BLD AUTO: 32.9 10*3/MM3 (ref 3.4–10.8)

## 2024-04-03 PROCEDURE — 81001 URINALYSIS AUTO W/SCOPE: CPT | Performed by: EMERGENCY MEDICINE

## 2024-04-03 PROCEDURE — 74176 CT ABD & PELVIS W/O CONTRAST: CPT

## 2024-04-03 PROCEDURE — 36415 COLL VENOUS BLD VENIPUNCTURE: CPT

## 2024-04-03 PROCEDURE — 25510000001 IOPAMIDOL PER 1 ML: Performed by: EMERGENCY MEDICINE

## 2024-04-03 PROCEDURE — 80053 COMPREHEN METABOLIC PANEL: CPT | Performed by: EMERGENCY MEDICINE

## 2024-04-03 PROCEDURE — 25010000002 MORPHINE PER 10 MG: Performed by: EMERGENCY MEDICINE

## 2024-04-03 PROCEDURE — 74177 CT ABD & PELVIS W/CONTRAST: CPT

## 2024-04-03 PROCEDURE — 99285 EMERGENCY DEPT VISIT HI MDM: CPT

## 2024-04-03 PROCEDURE — 87040 BLOOD CULTURE FOR BACTERIA: CPT | Performed by: EMERGENCY MEDICINE

## 2024-04-03 PROCEDURE — 85025 COMPLETE CBC W/AUTO DIFF WBC: CPT | Performed by: EMERGENCY MEDICINE

## 2024-04-03 RX ORDER — ERGOCALCIFEROL 1.25 MG/1
50000 CAPSULE ORAL WEEKLY
COMMUNITY

## 2024-04-03 RX ORDER — MORPHINE SULFATE 2 MG/ML
2 INJECTION, SOLUTION INTRAMUSCULAR; INTRAVENOUS ONCE
Status: COMPLETED | OUTPATIENT
Start: 2024-04-03 | End: 2024-04-03

## 2024-04-03 RX ORDER — SODIUM CHLORIDE 9 MG/ML
50 INJECTION, SOLUTION INTRAVENOUS CONTINUOUS
Status: DISCONTINUED | OUTPATIENT
Start: 2024-04-03 | End: 2024-04-08

## 2024-04-03 RX ORDER — SODIUM CHLORIDE 0.9 % (FLUSH) 0.9 %
10 SYRINGE (ML) INJECTION AS NEEDED
Status: DISCONTINUED | OUTPATIENT
Start: 2024-04-03 | End: 2024-04-09 | Stop reason: HOSPADM

## 2024-04-03 RX ORDER — MORPHINE SULFATE 2 MG/ML
2 INJECTION, SOLUTION INTRAMUSCULAR; INTRAVENOUS EVERY 4 HOURS PRN
Status: DISCONTINUED | OUTPATIENT
Start: 2024-04-03 | End: 2024-04-09 | Stop reason: HOSPADM

## 2024-04-03 RX ORDER — ONDANSETRON 2 MG/ML
4 INJECTION INTRAMUSCULAR; INTRAVENOUS EVERY 6 HOURS PRN
Status: DISCONTINUED | OUTPATIENT
Start: 2024-04-03 | End: 2024-04-09 | Stop reason: HOSPADM

## 2024-04-03 RX ORDER — POTASSIUM CHLORIDE 20 MEQ/1
40 TABLET, EXTENDED RELEASE ORAL EVERY 4 HOURS
Status: COMPLETED | OUTPATIENT
Start: 2024-04-03 | End: 2024-04-04

## 2024-04-03 RX ADMIN — IOPAMIDOL 100 ML: 755 INJECTION, SOLUTION INTRAVENOUS at 21:20

## 2024-04-03 RX ADMIN — MORPHINE SULFATE 2 MG: 2 INJECTION, SOLUTION INTRAMUSCULAR; INTRAVENOUS at 21:38

## 2024-04-04 LAB
ANION GAP SERPL CALCULATED.3IONS-SCNC: 11 MMOL/L (ref 5–15)
BASOPHILS # BLD AUTO: 0.09 10*3/MM3 (ref 0–0.2)
BASOPHILS NFR BLD AUTO: 0.3 % (ref 0–1.5)
BUN SERPL-MCNC: 19 MG/DL (ref 8–23)
BUN/CREAT SERPL: 18.1 (ref 7–25)
CALCIUM SPEC-SCNC: 9.1 MG/DL (ref 8.6–10.5)
CHLORIDE SERPL-SCNC: 93 MMOL/L (ref 98–107)
CO2 SERPL-SCNC: 30 MMOL/L (ref 22–29)
CREAT SERPL-MCNC: 1.05 MG/DL (ref 0.57–1)
D-LACTATE SERPL-SCNC: 1 MMOL/L (ref 0.5–2)
DEPRECATED RDW RBC AUTO: 43.6 FL (ref 37–54)
EGFRCR SERPLBLD CKD-EPI 2021: 60.6 ML/MIN/1.73
EOSINOPHIL # BLD AUTO: 0.01 10*3/MM3 (ref 0–0.4)
EOSINOPHIL NFR BLD AUTO: 0 % (ref 0.3–6.2)
ERYTHROCYTE [DISTWIDTH] IN BLOOD BY AUTOMATED COUNT: 12.7 % (ref 12.3–15.4)
GLUCOSE SERPL-MCNC: 88 MG/DL (ref 65–99)
HCT VFR BLD AUTO: 34.2 % (ref 34–46.6)
HGB BLD-MCNC: 11 G/DL (ref 12–15.9)
HOLD SPECIMEN: NORMAL
IMM GRANULOCYTES # BLD AUTO: 0.35 10*3/MM3 (ref 0–0.05)
IMM GRANULOCYTES NFR BLD AUTO: 1 % (ref 0–0.5)
LYMPHOCYTES # BLD AUTO: 1.98 10*3/MM3 (ref 0.7–3.1)
LYMPHOCYTES NFR BLD AUTO: 5.9 % (ref 19.6–45.3)
MCH RBC QN AUTO: 29.9 PG (ref 26.6–33)
MCHC RBC AUTO-ENTMCNC: 32.2 G/DL (ref 31.5–35.7)
MCV RBC AUTO: 92.9 FL (ref 79–97)
MONOCYTES # BLD AUTO: 1.99 10*3/MM3 (ref 0.1–0.9)
MONOCYTES NFR BLD AUTO: 5.9 % (ref 5–12)
MRSA DNA SPEC QL NAA+PROBE: NORMAL
NEUTROPHILS NFR BLD AUTO: 29.16 10*3/MM3 (ref 1.7–7)
NEUTROPHILS NFR BLD AUTO: 86.9 % (ref 42.7–76)
NRBC BLD AUTO-RTO: 0 /100 WBC (ref 0–0.2)
PLATELET # BLD AUTO: 358 10*3/MM3 (ref 140–450)
PMV BLD AUTO: 10.7 FL (ref 6–12)
POTASSIUM SERPL-SCNC: 3.8 MMOL/L (ref 3.5–5.2)
RBC # BLD AUTO: 3.68 10*6/MM3 (ref 3.77–5.28)
SODIUM SERPL-SCNC: 134 MMOL/L (ref 136–145)
WBC NRBC COR # BLD AUTO: 33.58 10*3/MM3 (ref 3.4–10.8)

## 2024-04-04 PROCEDURE — 80048 BASIC METABOLIC PNL TOTAL CA: CPT | Performed by: NURSE PRACTITIONER

## 2024-04-04 PROCEDURE — 99222 1ST HOSP IP/OBS MODERATE 55: CPT | Performed by: STUDENT IN AN ORGANIZED HEALTH CARE EDUCATION/TRAINING PROGRAM

## 2024-04-04 PROCEDURE — 25010000002 VANCOMYCIN HCL 1.25 G RECONSTITUTED SOLUTION 1 EACH VIAL: Performed by: EMERGENCY MEDICINE

## 2024-04-04 PROCEDURE — 25010000002 PIPERACILLIN SOD-TAZOBACTAM PER 1 G: Performed by: EMERGENCY MEDICINE

## 2024-04-04 PROCEDURE — 85025 COMPLETE CBC W/AUTO DIFF WBC: CPT | Performed by: NURSE PRACTITIONER

## 2024-04-04 PROCEDURE — 25810000003 SODIUM CHLORIDE 0.9 % SOLUTION: Performed by: NURSE PRACTITIONER

## 2024-04-04 PROCEDURE — 25810000003 SODIUM CHLORIDE 0.9 % SOLUTION 250 ML FLEX CONT: Performed by: EMERGENCY MEDICINE

## 2024-04-04 PROCEDURE — 87641 MR-STAPH DNA AMP PROBE: CPT

## 2024-04-04 PROCEDURE — 83605 ASSAY OF LACTIC ACID: CPT | Performed by: NURSE PRACTITIONER

## 2024-04-04 PROCEDURE — 25010000002 MORPHINE PER 10 MG: Performed by: NURSE PRACTITIONER

## 2024-04-04 PROCEDURE — 25010000002 PIPERACILLIN SOD-TAZOBACTAM PER 1 G: Performed by: NURSE PRACTITIONER

## 2024-04-04 RX ORDER — ALBUTEROL SULFATE 2.5 MG/3ML
2.5 SOLUTION RESPIRATORY (INHALATION) EVERY 4 HOURS PRN
Status: DISCONTINUED | OUTPATIENT
Start: 2024-04-04 | End: 2024-04-09 | Stop reason: HOSPADM

## 2024-04-04 RX ORDER — LEVOTHYROXINE SODIUM 88 UG/1
88 TABLET ORAL
Status: DISCONTINUED | OUTPATIENT
Start: 2024-04-04 | End: 2024-04-09 | Stop reason: HOSPADM

## 2024-04-04 RX ORDER — LISINOPRIL 20 MG/1
20 TABLET ORAL
Status: DISCONTINUED | OUTPATIENT
Start: 2024-04-04 | End: 2024-04-09 | Stop reason: HOSPADM

## 2024-04-04 RX ORDER — HYDROCHLOROTHIAZIDE 25 MG/1
25 TABLET ORAL
Status: DISCONTINUED | OUTPATIENT
Start: 2024-04-04 | End: 2024-04-04

## 2024-04-04 RX ORDER — AMOXICILLIN 250 MG
1 CAPSULE ORAL DAILY
Status: COMPLETED | OUTPATIENT
Start: 2024-04-04 | End: 2024-04-06

## 2024-04-04 RX ORDER — ERGOCALCIFEROL 1.25 MG/1
50000 CAPSULE ORAL WEEKLY
Status: DISCONTINUED | OUTPATIENT
Start: 2024-04-07 | End: 2024-04-09 | Stop reason: HOSPADM

## 2024-04-04 RX ORDER — AMLODIPINE BESYLATE 5 MG/1
5 TABLET ORAL DAILY
Status: DISCONTINUED | OUTPATIENT
Start: 2024-04-04 | End: 2024-04-06

## 2024-04-04 RX ADMIN — PIPERACILLIN AND TAZOBACTAM 3.38 G: 3; .375 INJECTION, POWDER, FOR SOLUTION INTRAVENOUS at 22:00

## 2024-04-04 RX ADMIN — POTASSIUM CHLORIDE 40 MEQ: 1500 TABLET, EXTENDED RELEASE ORAL at 08:10

## 2024-04-04 RX ADMIN — AMLODIPINE BESYLATE 5 MG: 5 TABLET ORAL at 08:10

## 2024-04-04 RX ADMIN — HYDROCHLOROTHIAZIDE 25 MG: 25 TABLET ORAL at 08:10

## 2024-04-04 RX ADMIN — MORPHINE SULFATE 2 MG: 2 INJECTION, SOLUTION INTRAMUSCULAR; INTRAVENOUS at 19:43

## 2024-04-04 RX ADMIN — Medication 10 ML: at 04:41

## 2024-04-04 RX ADMIN — LEVOTHYROXINE SODIUM 88 MCG: 88 TABLET ORAL at 05:11

## 2024-04-04 RX ADMIN — VANCOMYCIN HYDROCHLORIDE 1250 MG: 1.25 INJECTION, POWDER, LYOPHILIZED, FOR SOLUTION INTRAVENOUS at 01:32

## 2024-04-04 RX ADMIN — MORPHINE SULFATE 2 MG: 2 INJECTION, SOLUTION INTRAMUSCULAR; INTRAVENOUS at 09:55

## 2024-04-04 RX ADMIN — DOCUSATE SODIUM AND SENNOSIDES 1 TABLET: 8.6; 5 TABLET, FILM COATED ORAL at 08:10

## 2024-04-04 RX ADMIN — SODIUM CHLORIDE 100 ML/HR: 9 INJECTION, SOLUTION INTRAVENOUS at 00:21

## 2024-04-04 RX ADMIN — POTASSIUM CHLORIDE 40 MEQ: 1500 TABLET, EXTENDED RELEASE ORAL at 03:23

## 2024-04-04 RX ADMIN — PIPERACILLIN AND TAZOBACTAM 3.38 G: 3; .375 INJECTION, POWDER, FOR SOLUTION INTRAVENOUS at 15:10

## 2024-04-04 RX ADMIN — PIPERACILLIN AND TAZOBACTAM 3.38 G: 3; .375 INJECTION, POWDER, FOR SOLUTION INTRAVENOUS at 06:16

## 2024-04-04 RX ADMIN — POTASSIUM CHLORIDE 40 MEQ: 1500 TABLET, EXTENDED RELEASE ORAL at 00:27

## 2024-04-04 RX ADMIN — MORPHINE SULFATE 2 MG: 2 INJECTION, SOLUTION INTRAMUSCULAR; INTRAVENOUS at 03:29

## 2024-04-04 RX ADMIN — MORPHINE SULFATE 2 MG: 2 INJECTION, SOLUTION INTRAMUSCULAR; INTRAVENOUS at 15:10

## 2024-04-04 RX ADMIN — SODIUM CHLORIDE 100 ML/HR: 9 INJECTION, SOLUTION INTRAVENOUS at 19:54

## 2024-04-04 RX ADMIN — PIPERACILLIN AND TAZOBACTAM 3.38 G: 3; .375 INJECTION, POWDER, FOR SOLUTION INTRAVENOUS at 00:27

## 2024-04-04 RX ADMIN — LISINOPRIL 20 MG: 20 TABLET ORAL at 08:10

## 2024-04-04 NOTE — H&P
"[facility] Medicine Services  History & Physical    Patient Name: Kirby Gunter  : 1965  MRN: [id number]  Primary Care Physician:  Chava López MD  Date of admission: 4/3/2024  Date and Time of Service: 4/3/2024 at 2215    Subjective      Chief Complaint: abdominal pain and nausea     History of Present Illness: Kirby Gunter is a  very pleasant 59 y.o. female with a CMH of hypertension and hypothyroidism who presented to [facility] on 4/3/2024 with complaints of abdominal pain and nausea for the past 5 days.  Denies any fever cough congestion chest pain shortness of breath or diarrhea..  She is afebrile not hypotensive initially tachycardic which has improved.  She reports she thought she was initially constipated but subsequently had a \"blowout\" earlier today.  She denies any melena, hematochezia or hematemesis.  Labs today showed a sodium of 133 potassium 3.1 creatinine 1.06 alk phos 135 AST 53 ALT 80, WBC 32.9 hemoglobin 11.6 urinalysis shows no bacteria, CT abdomen and pelvis with contrast per radiology today showed severe inflammation of the colon with intramural abscess per radiology findings may indicate diverticulitis or focal colitis.  Limited characterization due to intense inflammation.  Per radiology there is a secondary severe inflammation of the adjacent small bowel without fistulization appreciated.    She was started on IV Zosyn and IV vancomycin pharmacy will dose, IV fluid hydration and will be admitted for further evaluation and treatment.  Surgical consultation was ordered given the severe nature of the inflammation per CT.   Blood cultures were ordered and are pending.      Review of Systems   Constitutional: Negative.    HENT: Negative.     Eyes: Negative.    Respiratory: Negative.     Cardiovascular: Negative.    Gastrointestinal:  Positive for abdominal pain, constipation, nausea and vomiting.   Endocrine: Negative.    Genitourinary: Negative.  "   Musculoskeletal: Negative.    Skin: Negative.    Allergic/Immunologic: Negative.    Neurological: Negative.    Hematological: Negative.    Psychiatric/Behavioral: Negative.     All other systems reviewed and are negative.      Personal History     Past Medical History:   Diagnosis Date    Acute URI     Hypertension     Hypothyroidism     Overweight        No past surgical history on file.    Family History: family history includes Aneurysm in her brother and mother; Arthritis in her mother; Hypertension in her mother and sister. Otherwise pertinent FHx was reviewed and not pertinent to current issue.    Social History:  reports that she quit smoking about 10 months ago. Her smoking use included cigarettes. She started smoking about 42 years ago. She has a 20.7 pack-year smoking history. She has been exposed to tobacco smoke. She has never used smokeless tobacco. She reports current alcohol use. She reports that she does not currently use drugs.    Home Medications:  Prior to Admission Medications       Prescriptions Last Dose Informant Patient Reported? Taking?    albuterol sulfate  (90 Base) MCG/ACT inhaler   No No    Inhale 2 puffs Every 4 (Four) Hours As Needed for Wheezing or Shortness of Air.    albuterol sulfate  (90 Base) MCG/ACT inhaler   No No    Inhale 2 puffs Every 4 (Four) Hours As Needed for Wheezing.    amLODIPine (NORVASC) 5 MG tablet   No No    TAKE 1 TABLET BY MOUTH DAILY    benzonatate (TESSALON) 100 MG capsule   No No    1-2 PO tid prn cough    dicyclomine (BENTYL) 10 MG capsule   No No    Take 1 capsule by mouth 4 (Four) Times a Day As Needed for Abdominal Cramping.    levothyroxine (SYNTHROID, LEVOTHROID) 88 MCG tablet   No No    Take 1 tablet by mouth Daily.    lisinopril-hydrochlorothiazide (PRINZIDE,ZESTORETIC) 20-25 MG per tablet   No No    Take 1 tablet by mouth Daily.    O2 (OXYGEN)   Yes No    Inhale 3 L/min Continuous.    promethazine-dextromethorphan (PROMETHAZINE-DM)  6.25-15 MG/5ML syrup   No No    Take 5 mL by mouth At Night As Needed for Cough.    sennosides-docusate (senna-docusate sodium) 8.6-50 MG per tablet   No No    Take 1 tablet by mouth Daily for 5 days.    sorbitol 70 % solution   No No    Take 15 mL by mouth Daily As Needed (15ml every 15 minutes until BM).    vitamin D (ERGOCALCIFEROL) 1.25 MG (06220 UT) capsule capsule   No No    Take 1 capsule by mouth 1 (One) Time Per Week.              Allergies:  No Known Allergies    Objective      Vitals:   Temp:  [97.9 °F (36.6 °C)] 97.9 °F (36.6 °C)  Heart Rate:  [102-118] 110  Resp:  [18] 18  BP: (102-141)/() 102/54  Body mass index is 26.85 kg/m².  Physical Exam  Vitals reviewed.   Constitutional:       Appearance: Normal appearance.   HENT:      Head: Normocephalic and atraumatic.      Right Ear: External ear normal.      Left Ear: External ear normal.      Nose: Nose normal.      Mouth/Throat:      Mouth: Mucous membranes are moist.   Eyes:      Extraocular Movements: Extraocular movements intact.   Cardiovascular:      Rate and Rhythm: Normal rate and regular rhythm.      Pulses: Normal pulses.      Heart sounds: Normal heart sounds.   Pulmonary:      Effort: Pulmonary effort is normal.      Breath sounds: Normal breath sounds.   Abdominal:      Palpations: Abdomen is soft.   Genitourinary:     Comments: deferred  Musculoskeletal:         General: Normal range of motion.      Cervical back: Normal range of motion and neck supple.   Skin:     General: Skin is warm and dry.   Neurological:      General: No focal deficit present.      Mental Status: She is alert and oriented to person, place, and time.   Psychiatric:         Mood and Affect: Mood normal.         Behavior: Behavior normal.         Thought Content: Thought content normal.         Judgment: Judgment normal.         Diagnostic Data:  Lab Results (last 24 hours)       Procedure Component Value Units Date/Time    Blood Culture - Blood, Arm, Right  [[id number]] Collected: 04/03/24 2334    Specimen: Blood from Arm, Right Updated: 04/03/24 2347    Blood Culture - Blood, Arm, Right [[id number]] Collected: 04/03/24 2334    Specimen: Blood from Arm, Right Updated: 04/03/24 2347    Urinalysis, Microscopic Only - Urine, Clean Catch [[id number]]  (Abnormal) Collected: 04/03/24 1652    Specimen: Urine, Clean Catch Updated: 04/03/24 1710     RBC, UA 6-10 /HPF      WBC, UA 3-5 /HPF      Bacteria, UA None Seen /HPF      Squamous Epithelial Cells, UA 13-20 /HPF      Hyaline Casts, UA 3-6 /LPF      Methodology Automated Microscopy    Urinalysis With Microscopic If Indicated (No Culture) - Urine, Clean Catch [[id number]]  (Abnormal) Collected: 04/03/24 1652    Specimen: Urine, Clean Catch Updated: 04/03/24 1710     Color, UA Yellow     Appearance, UA Cloudy     pH, UA 6.0     Specific Gravity, UA 1.014     Glucose, UA Negative     Ketones, UA Trace     Bilirubin, UA Negative     Blood, UA Trace     Protein, UA 30 mg/dL (1+)     Leuk Esterase, UA Trace     Nitrite, UA Negative     Urobilinogen, UA 1.0 E.U./dL    Comprehensive Metabolic Panel [[id number]]  (Abnormal) Collected: 04/03/24 1631    Specimen: Blood Updated: 04/03/24 1704     Glucose 131 mg/dL      BUN 15 mg/dL      Creatinine 1.06 mg/dL      Sodium 133 mmol/L      Potassium 3.1 mmol/L      Chloride 90 mmol/L      CO2 30.0 mmol/L      Calcium 9.2 mg/dL      Total Protein 7.3 g/dL      Albumin 4.0 g/dL      ALT (SGPT) 80 U/L      AST (SGOT) 53 U/L      Alkaline Phosphatase 135 U/L      Total Bilirubin 1.2 mg/dL      Globulin 3.3 gm/dL      A/G Ratio 1.2 g/dL      BUN/Creatinine Ratio 14.2     Anion Gap 13.0 mmol/L      eGFR 59.9 mL/min/1.73     Narrative:      GFR Normal >60  Chronic Kidney Disease <60  Kidney Failure <15      CBC & Differential [[id number]]  (Abnormal) Collected: 04/03/24 1631    Specimen: Blood Updated: 04/03/24 5903    Narrative:      The following orders were created for panel order CBC &  Differential.  Procedure                               Abnormality         Status                     ---------                               -----------         ------                     CBC Auto Differential[[id number]]        Abnormal            Final result               Scan Slide[[id number]]                                                                    Please view results for these tests on the individual orders.    CBC Auto Differential [[id number]]  (Abnormal) Collected: 04/03/24 1631    Specimen: Blood Updated: 04/03/24 1648     WBC 32.90 10*3/mm3      RBC 3.91 10*6/mm3      Hemoglobin 11.6 g/dL      Hematocrit 35.6 %      MCV 91.0 fL      MCH 29.7 pg      MCHC 32.6 g/dL      RDW 12.5 %      RDW-SD 41.4 fl      MPV 10.6 fL      Platelets 364 10*3/mm3      Neutrophil % 88.6 %      Lymphocyte % 4.7 %      Monocyte % 5.4 %      Eosinophil % 0.1 %      Basophil % 0.3 %      Immature Grans % 0.9 %      Neutrophils, Absolute 29.14 10*3/mm3      Lymphocytes, Absolute 1.54 10*3/mm3      Monocytes, Absolute 1.79 10*3/mm3      Eosinophils, Absolute 0.04 10*3/mm3      Basophils, Absolute 0.10 10*3/mm3      Immature Grans, Absolute 0.29 10*3/mm3      nRBC 0.0 /100 WBC              Imaging Results (Last 24 Hours)       Procedure Component Value Units Date/Time    CT Abdomen Pelvis With Contrast [[id number]] Collected: 04/03/24 2142     Updated: 04/03/24 2148    Narrative:      CT ABDOMEN PELVIS W CONTRAST    Date of Exam: 4/3/2024 9:16 PM EDT    Indication: Abdominal pain, acute, nonlocalized.    Comparison: 4/3/2024    Technique: Axial CT images were obtained of the abdomen and pelvis following the uneventful intravenous administration of iodinated contrast. Sagittal and coronal reconstructions were performed.  Automated exposure control and iterative reconstruction   methods were used.    FINDINGS:    Lung bases: No masses. No consolidation.    Liver:No masses. No intrahepatic biliary ductal dilatation.    Spleen:No  masses. No perisplenic hematoma.    Pancreas:No pancreatic masses. No evidence of pancreatitis.    Gallbladder and common bile duct:No evidence of cholelithiasis. No evidence of cholecystitis.    Adrenal glands:No adrenal masses    Kidneys and ureters:No kidney stones. No renal masses.No calculi present within the ureters. Normal caliber ureters.    Urinary bladder:No urinary bladder wall thickening. No bladder masses.    Small bowel:Normal caliber small bowel.    Large bowel: Severe colonic inflammation involving the sigmoid colon of the sigmoid colon with secondary severe inflammation of the adjacent loops of small bowel. No fistulization is present. Small abscess partially within the anterior superior colonic   wall noted measuring 1.9 cm x 1.4 cm. Significant surrounding inflammation of the fat.    Appendix: Normal    GENITOURINARY: Normal appearance of the uterus and adnexa.    Ascites or pneumoperitoneum: Trace free fluid.    Adenopathy:None present    Osseous structures: Degenerative changes of the lumbar spine and the knee hips.    Other findings: None      Impression:      Severe inflammation of the colon with intramural abscess. Findings may indicate diverticulitis or focal colitis. Limited characterization due to intense inflammation. There is secondary severe inflammation of an adjacent small bowel without   fistulization appreciated.            Electronically Signed: Alexi Kirk MD    4/3/2024 9:46 PM EDT    Workstation ID: [id number]    CT Abdomen Pelvis Without Contrast [[id number]] Collected: 04/03/24 1730     Updated: 04/03/24 1749    Narrative:      CT ABDOMEN PELVIS WO CONTRAST    Date of Exam: 4/3/2024 5:25 PM EDT    Indication: Abdominal pain, acute, nonlocalized.    Comparison: None available.    Technique: Axial CT images were obtained of the abdomen and pelvis without the administration of contrast. Sagittal and coronal reconstructions were performed.  Automated exposure control and iterative  reconstruction methods were used.      Findings:  Lung Bases:    The visualized lung bases and lower mediastinal structures are unremarkable.      Liver:Liver is normal in size and CT density. No focal lesions.      Biliary/Gallbladder: The gallbladder is without evidence of radiopaque stones. The biliary tree is nondilated.      Spleen:Spleen is normal in size and CT density.    Pancreas:   Pancreas shows homogeneous density. There is no evidence of pancreatic mass or peripancreatic fluid.      Kidneys: Kidneys are normal in size. There are no stones or hydronephrosis.      Adrenals: Adrenal glands are unremarkable.      Retroperitoneal/Lymph Nodes/Vasculature: No retroperitoneal adenopathy is identified by size criteria.      Gastrointestinal/Mesentery: The stomach is unremarkable. There are few mildly distended loops of small bowel with air-fluid levels. The appendix is within normal limits. There is a long segment of severe diffuse thickening of the sigmoid colon wall with   extensive pericolonic fat stranding. There is also a focally adjacent loop of small bowel in the right mid pelvis with diffuse wall thickening and surrounding inflammatory changes. There is a focal area of loss of the fat planes between the sigmoid colon   and adjacent small bowel loop (image 88 series 2). The lack of contrast limits the characterization. Findings are compatible with sigmoiditis and ileitis. A fistulous tract cannot be excluded. No definite focal fluid collections are seen or free air.   There is no significant free fluid.    There is an omental fat umbilical hernia    Bladder: Bladder is contracted. There is diffuse thickening of the bladder wall. No intraluminal stones    Bony Structures:  Visualized bony structures are consistent with the patient's age.        Impression:      Impression:    1. Long segment of severe diffuse thickening of the sigmoid colon wall with extensive pericolonic fat stranding compatible with  sigmoiditis. Adjacent to the inflamed sigmoid colon towards the right there is diffuse thickening of few small bowel loops   with surrounding fat stranding compatible with an ileitis or reactive inflammatory response. There is a focal area of loss of the fat planes between the sigmoid colon and adjacent small bowel loop at this level. A fistulous tract is not evident but   cannot be excluded. Further evaluation with a CT abdomen pelvis with intravenous and oral contrast recommended    2. No definite focal fluid collections or free air identified            Electronically Signed: Ari Bettencourt MD    4/3/2024 5:47 PM EDT    Workstation ID: [id number]              Assessment & Plan        This is a 59 y.o. female with:    Active and Resolved Problems  Active Hospital Problems    Diagnosis  POA    **Colitis with abscess [K52.9, K63.0]  Yes     Priority: High    Hypokalemia [E87.6]  Yes     Priority: Medium    Elevated LFTs [R79.89]  Yes     Priority: Medium    Acute renal insufficiency [N28.9]  Yes     Priority: Medium    Leukocytosis [D72.829]  Yes     Priority: Medium    Hypothyroidism [E03.9]  Yes    Essential hypertension [I10]  Yes      Resolved Hospital Problems   No resolved problems to display.     Colitis with abscess with severe inflammation per CT radiology report, continue IV Zosyn and IV vancomycin pharmacy to dose with blood cultures pending clear liquid diet, surgical consultation given severity of inflammation per radiology.  2 mg IV morphine every 4 hours as needed 4 mg IV Zofran every 6 hours as needed    Hypokalemia, replacement protocol in place repeat BMP in a.m.    Elevated LFTs likely secondary to above continue to monitor    Acute renal insufficiency, very mild likely secondary to dehydration, avoid nephrotoxic meds trend renal function normal saline IV fluid hydration    Leukocytosis likely secondary to colitis with abscess see plan above febrile    Essential hypertension, reordered home  amlodipine reordered home lisinopril and hydrochlorothiazide his creatinine is only 1.06 monitor BP    Hypothyroidism, reordered home levothyroxine      DVT prophylaxis:  Mechanical DVT prophylaxis orders are present.        The patient desires to be as follows:    CODE STATUS:    Code Status (Patient has no pulse and is not breathing): CPR (Attempt to Resuscitate)  Medical Interventions (Patient has pulse or is breathing): Full Support          Admission Status:  I believe this patient meets inpatient  status.    Expected Length of Stay: pending clinical course     PDMP and Medication Dispenses via Sidebar reviewed and consistent with patient reported medications.    I discussed the patient's findings and my recommendations with patient.      Signature:     This document has been electronically signed by JAIRO Forte on April 4, 2024 00:15 EDT   [facility] Hospitalist Team

## 2024-04-04 NOTE — PROGRESS NOTES
[facility] MEDICINE SERVICE  DAILY PROGRESS NOTE    NAME: Kirby Gunter  : 1965  MRN: [id number]      LOS: 1 day     PROVIDER OF SERVICE: Ji Richardson MD    Chief Complaint: Colitis with abscess    Subjective:     Interval History: Patient complaining of some abdominal pain this morning, although states that overall it is improved.  She did not have much pain medication last night but is asking for some this morning.  She is tolerating some clear liquid diet.    Review of Systems:   Review of Systems    Objective:     Vital Signs  Temp:  [97.9 °F (36.6 °C)-98.3 °F (36.8 °C)] 98.3 °F (36.8 °C)  Heart Rate:  [101-118] 109  Resp:  [15-18] 18  BP: ()/() 94/58  Flow (L/min):  [2-3] 2   Body mass index is 26.85 kg/m².    Physical Exam  Physical Exam  General Appearance:  Alert, cooperative, no distress, appears stated age  Head:  Normocephalic, without obvious abnormality, atraumatic  Eyes:  PERRL, conjunctiva/corneas clear, EOM's intact, fundi benign, both eyes  Ears:  Normal TM's and external ear canals, both ears  Nose: Nares normal, septum midline, mucosa normal, no drainage or sinus tenderness  Throat: Lips, mucosa, and tongue normal; teeth and gums normal  Neck: Supple, symmetrical, trachea midline, no adenopathy, thyroid: not enlarged, symmetric, no tenderness/mass/nodules, no carotid bruit or JVD  Lungs:   Clear to auscultation bilaterally, respirations unlabored  Heart:  Regular rate and rhythm, S1, S2 normal, no murmur, rub or gallop  Abdomen:  Soft, lower abdominal TTP, bowel sounds active all four quadrants,  no masses, no organomegaly  Extremities: Extremities normal, atraumatic, no cyanosis or edema  Pulses: 2+ and symmetric  Skin: Skin color, texture, turgor normal, no rashes or lesions  Neurologic: Normal      Scheduled Meds   amLODIPine, 5 mg, Oral, Daily  levothyroxine, 88 mcg, Oral, Q AM  lisinopril, 20 mg, Oral, Q24H  piperacillin-tazobactam, 3.375 g, Intravenous,  Q8H  sennosides-docusate, 1 tablet, Oral, Daily  [START ON 4/7/2024] vitamin D, 50,000 Units, Oral, Weekly       PRN Meds     albuterol    Morphine    ondansetron    Pharmacy to Dose Zosyn    Potassium Replacement - Follow Nurse / BPA Driven Protocol    [COMPLETED] Insert Peripheral IV **AND** sodium chloride   Infusions  Pharmacy to Dose Zosyn,   sodium chloride, 100 mL/hr, Last Rate: 100 mL/hr (04/04/24 0021)          Diagnostic Data    Results from last 7 days   Lab Units 04/04/24  0538 04/03/24  1631   WBC 10*3/mm3 33.58* 32.90*   HEMOGLOBIN g/dL 11.0* 11.6*   HEMATOCRIT % 34.2 35.6   PLATELETS 10*3/mm3 358 364   GLUCOSE mg/dL 88 131*   CREATININE mg/dL 1.05* 1.06*   BUN mg/dL 19 15   SODIUM mmol/L 134* 133*   POTASSIUM mmol/L 3.8 3.1*   AST (SGOT) U/L  --  53*   ALT (SGPT) U/L  --  80*   ALK PHOS U/L  --  135*   BILIRUBIN mg/dL  --  1.2   ANION GAP mmol/L 11.0 13.0       CT Abdomen Pelvis With Contrast    Result Date: 4/3/2024  Severe inflammation of the colon with intramural abscess. Findings may indicate diverticulitis or focal colitis. Limited characterization due to intense inflammation. There is secondary severe inflammation of an adjacent small bowel without fistulization appreciated. Electronically Signed: Alexi Kirk MD  4/3/2024 9:46 PM EDT  Workstation ID: [id number]    CT Abdomen Pelvis Without Contrast    Result Date: 4/3/2024  Impression: 1. Long segment of severe diffuse thickening of the sigmoid colon wall with extensive pericolonic fat stranding compatible with sigmoiditis. Adjacent to the inflamed sigmoid colon towards the right there is diffuse thickening of few small bowel loops with surrounding fat stranding compatible with an ileitis or reactive inflammatory response. There is a focal area of loss of the fat planes between the sigmoid colon and adjacent small bowel loop at this level. A fistulous tract is not evident but cannot be excluded. Further evaluation with a CT abdomen pelvis with  intravenous and oral contrast recommended 2. No definite focal fluid collections or free air identified Electronically Signed: Ari Bettencourt MD  4/3/2024 5:47 PM EDT  Workstation ID: [id number]    XR Abdomen KUB    Result Date: 4/2/2024  Possible constipation in the appropriate clinical setting. Electronically Signed: Alexi Kirk MD  4/2/2024 6:44 PM EDT  Workstation ID: [id number]       I reviewed the patient's new clinical results.    Assessment/Plan:     Active and Resolved Problems  Active Hospital Problems    Diagnosis  POA    **Colitis with abscess [K52.9, K63.0]  Yes    Hypokalemia [E87.6]  Yes    Elevated LFTs [R79.89]  Yes    Acute renal insufficiency [N28.9]  Yes    Leukocytosis [D72.829]  Yes    Hypothyroidism [E03.9]  Yes    Essential hypertension [I10]  Yes      Resolved Hospital Problems   No resolved problems to display.       Acute colitis with intramural abscess with sepsis (POA)  -Patient has underlying infectious process with colitis and small intramural abscess with significant leukocytosis and tachycardia, all consistent with sepsis  -Continue IV antibiotics with Zosyn but discontinue vancomycin  -She is tolerating some clear liquid diet today  -Discussed with patient regarding possible need for surgical intervention with partial colon resection and colostomy; if patient's symptoms get worse, she would likely require surgical intervention  -Appreciate surgery consultation    Hypertension  -Continue medications with lisinopril, amlodipine but holding HCTZ    Hypothyroidism  -Continue Synthroid    Mild HERSON  -Renal function appears stable  -Avoid nephrotoxic medications if able    Acute hypokalemia  -Repleted        DVT prophylaxis:  Mechanical DVT prophylaxis orders are present.         Code status is   Code Status and Medical Interventions:   Ordered at: 04/03/24 2211     Code Status (Patient has no pulse and is not breathing):    CPR (Attempt to Resuscitate)     Medical Interventions (Patient has  pulse or is breathing):    Full Support       Plan for disposition: Pending clinical course.  Patient may require surgical invention on this admission.    Time: 30 minutes    Signature: Electronically signed by Ji Richardson MD, 04/04/24, 11:26 EDT.  [facility] Hospitalist Team

## 2024-04-04 NOTE — PLAN OF CARE
Goal Outcome Evaluation:  Plan of Care Reviewed With: patient        Progress: improving  Outcome Evaluation: Pt up ad saravanan to bathroom. Pt with some complaints of pain this shift, PRN medication given per MAR. Pt VSS, continues IVFs and antibiotics, no concerns at this time, tolerating clear liquid diet.

## 2024-04-04 NOTE — PAYOR COMM NOTE
"Kirby Simmons (59 y.o. Female)       Date of Birth   1965    Social Security Number       Address   [address] [place] [address] [place] [facility]    Home Phone   [phone]    MRN   [id number]       M[employer]ism   Patient Refused    Marital Status   Single                            Admission Date   4/3/24    Admission Type   Emergency    Admitting Provider   Dale Eubanks MD    Attending Provider   Ji Richardson MD    Department, Room/Bed   [facility],        Discharge Date       Discharge Disposition       Discharge Destination                                 Attending Provider: Ji Richardson MD    Allergies: No Known Allergies    Isolation: None   Infection: None   Code Status: CPR    Ht: 157.5 cm (62\")   Wt: 66.6 kg (146 lb 13.2 oz)    Admission Cmt: None   Principal Problem: Colitis with abscess [K52.9,K63.0]                   Active Insurance as of 4/3/2024       Primary Coverage       Payor Plan Insurance Group Employer/Plan Group    [place] [id number]       Payor Plan Address Payor Plan Phone Number Payor Plan Fax Number Effective Dates    MAIL STOP:   2017 - None Entered    [address] [address]       [facility] [id number]         Subscriber Name Subscriber Birth Date Member ID       KIRBY SIMMONS 1965 [id number]                     Emergency Contacts        (Rel.) Home Phone Work Phone Mobile Phone    MARTÍN RANDHAWA (Significant Other) [phone] -- --    ARCELIA CALVERT (Sister) -- -- --                 History & Physical        Essing-Nayeli Nagel APRN at 24 2217       Attestation signed by Dale Eubanks MD at 24 0043    I have reviewed this document and agree                    [facility] Medicine Services  History & Physical    Patient Name: Kirby Simmons  : 1965  MRN: [id number]  Primary Care Physician:  Chava López MD  Date of admission: 4/3/2024  Date " "and Time of Service: 4/3/2024 at 2215    Subjective      Chief Complaint: abdominal pain and nausea     History of Present Illness: Kirby Gunter is a  very pleasant 59 y.o. female with a CMH of hypertension and hypothyroidism who presented to [facility] on 4/3/2024 with complaints of abdominal pain and nausea for the past 5 days.  Denies any fever cough congestion chest pain shortness of breath or diarrhea..  She is afebrile not hypotensive initially tachycardic which has improved.  She reports she thought she was initially constipated but subsequently had a \"blowout\" earlier today.  She denies any melena, hematochezia or hematemesis.  Labs today showed a sodium of 133 potassium 3.1 creatinine 1.06 alk phos 135 AST 53 ALT 80, WBC 32.9 hemoglobin 11.6 urinalysis shows no bacteria, CT abdomen and pelvis with contrast per radiology today showed severe inflammation of the colon with intramural abscess per radiology findings may indicate diverticulitis or focal colitis.  Limited characterization due to intense inflammation.  Per radiology there is a secondary severe inflammation of the adjacent small bowel without fistulization appreciated.    She was started on IV Zosyn and IV vancomycin pharmacy will dose, IV fluid hydration and will be admitted for further evaluation and treatment.  Surgical consultation was ordered given the severe nature of the inflammation per CT.   Blood cultures were ordered and are pending.      Review of Systems   Constitutional: Negative.    HENT: Negative.     Eyes: Negative.    Respiratory: Negative.     Cardiovascular: Negative.    Gastrointestinal:  Positive for abdominal pain, constipation, nausea and vomiting.   Endocrine: Negative.    Genitourinary: Negative.    Musculoskeletal: Negative.    Skin: Negative.    Allergic/Immunologic: Negative.    Neurological: Negative.    Hematological: Negative.    Psychiatric/Behavioral: Negative.     All other systems reviewed and are " negative.      Personal History     Past Medical History:   Diagnosis Date    Acute URI     Hypertension     Hypothyroidism     Overweight        No past surgical history on file.    Family History: family history includes Aneurysm in her brother and mother; Arthritis in her mother; Hypertension in her mother and sister. Otherwise pertinent FHx was reviewed and not pertinent to current issue.    Social History:  reports that she quit smoking about 10 months ago. Her smoking use included cigarettes. She started smoking about 42 years ago. She has a 20.7 pack-year smoking history. She has been exposed to tobacco smoke. She has never used smokeless tobacco. She reports current alcohol use. She reports that she does not currently use drugs.    Home Medications:  Prior to Admission Medications       Prescriptions Last Dose Informant Patient Reported? Taking?    albuterol sulfate  (90 Base) MCG/ACT inhaler   No No    Inhale 2 puffs Every 4 (Four) Hours As Needed for Wheezing or Shortness of Air.    albuterol sulfate  (90 Base) MCG/ACT inhaler   No No    Inhale 2 puffs Every 4 (Four) Hours As Needed for Wheezing.    amLODIPine (NORVASC) 5 MG tablet   No No    TAKE 1 TABLET BY MOUTH DAILY    benzonatate (TESSALON) 100 MG capsule   No No    1-2 PO tid prn cough    dicyclomine (BENTYL) 10 MG capsule   No No    Take 1 capsule by mouth 4 (Four) Times a Day As Needed for Abdominal Cramping.    levothyroxine (SYNTHROID, LEVOTHROID) 88 MCG tablet   No No    Take 1 tablet by mouth Daily.    lisinopril-hydrochlorothiazide (PRINZIDE,ZESTORETIC) 20-25 MG per tablet   No No    Take 1 tablet by mouth Daily.    O2 (OXYGEN)   Yes No    Inhale 3 L/min Continuous.    promethazine-dextromethorphan (PROMETHAZINE-DM) 6.25-15 MG/5ML syrup   No No    Take 5 mL by mouth At Night As Needed for Cough.    sennosides-docusate (senna-docusate sodium) 8.6-50 MG per tablet   No No    Take 1 tablet by mouth Daily for 5 days.    sorbitol 70  % solution   No No    Take 15 mL by mouth Daily As Needed (15ml every 15 minutes until BM).    vitamin D (ERGOCALCIFEROL) 1.25 MG (06768 UT) capsule capsule   No No    Take 1 capsule by mouth 1 (One) Time Per Week.              Allergies:  No Known Allergies    Objective      Vitals:   Temp:  [97.9 °F (36.6 °C)] 97.9 °F (36.6 °C)  Heart Rate:  [102-118] 110  Resp:  [18] 18  BP: (102-141)/() 102/54  Body mass index is 26.85 kg/m².  Physical Exam  Vitals reviewed.   Constitutional:       Appearance: Normal appearance.   HENT:      Head: Normocephalic and atraumatic.      Right Ear: External ear normal.      Left Ear: External ear normal.      Nose: Nose normal.      Mouth/Throat:      Mouth: Mucous membranes are moist.   Eyes:      Extraocular Movements: Extraocular movements intact.   Cardiovascular:      Rate and Rhythm: Normal rate and regular rhythm.      Pulses: Normal pulses.      Heart sounds: Normal heart sounds.   Pulmonary:      Effort: Pulmonary effort is normal.      Breath sounds: Normal breath sounds.   Abdominal:      Palpations: Abdomen is soft.   Genitourinary:     Comments: deferred  Musculoskeletal:         General: Normal range of motion.      Cervical back: Normal range of motion and neck supple.   Skin:     General: Skin is warm and dry.   Neurological:      General: No focal deficit present.      Mental Status: She is alert and oriented to person, place, and time.   Psychiatric:         Mood and Affect: Mood normal.         Behavior: Behavior normal.         Thought Content: Thought content normal.         Judgment: Judgment normal.         Diagnostic Data:  Lab Results (last 24 hours)       Procedure Component Value Units Date/Time    Blood Culture - Blood, Arm, Right [[id number]] Collected: 04/03/24 2334    Specimen: Blood from Arm, Right Updated: 04/03/24 2347    Blood Culture - Blood, Arm, Right [[id number]] Collected: 04/03/24 2334    Specimen: Blood from Arm, Right Updated: 04/03/24  2347    Urinalysis, Microscopic Only - Urine, Clean Catch [[id number]]  (Abnormal) Collected: 04/03/24 1652    Specimen: Urine, Clean Catch Updated: 04/03/24 1710     RBC, UA 6-10 /HPF      WBC, UA 3-5 /HPF      Bacteria, UA None Seen /HPF      Squamous Epithelial Cells, UA 13-20 /HPF      Hyaline Casts, UA 3-6 /LPF      Methodology Automated Microscopy    Urinalysis With Microscopic If Indicated (No Culture) - Urine, Clean Catch [[id number]]  (Abnormal) Collected: 04/03/24 1652    Specimen: Urine, Clean Catch Updated: 04/03/24 1710     Color, UA Yellow     Appearance, UA Cloudy     pH, UA 6.0     Specific Gravity, UA 1.014     Glucose, UA Negative     Ketones, UA Trace     Bilirubin, UA Negative     Blood, UA Trace     Protein, UA 30 mg/dL (1+)     Leuk Esterase, UA Trace     Nitrite, UA Negative     Urobilinogen, UA 1.0 E.U./dL    Comprehensive Metabolic Panel [[id number]]  (Abnormal) Collected: 04/03/24 1631    Specimen: Blood Updated: 04/03/24 1704     Glucose 131 mg/dL      BUN 15 mg/dL      Creatinine 1.06 mg/dL      Sodium 133 mmol/L      Potassium 3.1 mmol/L      Chloride 90 mmol/L      CO2 30.0 mmol/L      Calcium 9.2 mg/dL      Total Protein 7.3 g/dL      Albumin 4.0 g/dL      ALT (SGPT) 80 U/L      AST (SGOT) 53 U/L      Alkaline Phosphatase 135 U/L      Total Bilirubin 1.2 mg/dL      Globulin 3.3 gm/dL      A/G Ratio 1.2 g/dL      BUN/Creatinine Ratio 14.2     Anion Gap 13.0 mmol/L      eGFR 59.9 mL/min/1.73     Narrative:      GFR Normal >60  Chronic Kidney Disease <60  Kidney Failure <15      CBC & Differential [[id number]]  (Abnormal) Collected: 04/03/24 1631    Specimen: Blood Updated: 04/03/24 1650    Narrative:      The following orders were created for panel order CBC & Differential.  Procedure                               Abnormality         Status                     ---------                               -----------         ------                     CBC Auto Differential[[id number]]         Abnormal            Final result               Scan Slide[[id number]]                                                                    Please view results for these tests on the individual orders.    CBC Auto Differential [[id number]]  (Abnormal) Collected: 04/03/24 1631    Specimen: Blood Updated: 04/03/24 1648     WBC 32.90 10*3/mm3      RBC 3.91 10*6/mm3      Hemoglobin 11.6 g/dL      Hematocrit 35.6 %      MCV 91.0 fL      MCH 29.7 pg      MCHC 32.6 g/dL      RDW 12.5 %      RDW-SD 41.4 fl      MPV 10.6 fL      Platelets 364 10*3/mm3      Neutrophil % 88.6 %      Lymphocyte % 4.7 %      Monocyte % 5.4 %      Eosinophil % 0.1 %      Basophil % 0.3 %      Immature Grans % 0.9 %      Neutrophils, Absolute 29.14 10*3/mm3      Lymphocytes, Absolute 1.54 10*3/mm3      Monocytes, Absolute 1.79 10*3/mm3      Eosinophils, Absolute 0.04 10*3/mm3      Basophils, Absolute 0.10 10*3/mm3      Immature Grans, Absolute 0.29 10*3/mm3      nRBC 0.0 /100 WBC              Imaging Results (Last 24 Hours)       Procedure Component Value Units Date/Time    CT Abdomen Pelvis With Contrast [[id number]] Collected: 04/03/24 2142     Updated: 04/03/24 2148    Narrative:      CT ABDOMEN PELVIS W CONTRAST    Date of Exam: 4/3/2024 9:16 PM EDT    Indication: Abdominal pain, acute, nonlocalized.    Comparison: 4/3/2024    Technique: Axial CT images were obtained of the abdomen and pelvis following the uneventful intravenous administration of iodinated contrast. Sagittal and coronal reconstructions were performed.  Automated exposure control and iterative reconstruction   methods were used.    FINDINGS:    Lung bases: No masses. No consolidation.    Liver:No masses. No intrahepatic biliary ductal dilatation.    Spleen:No masses. No perisplenic hematoma.    Pancreas:No pancreatic masses. No evidence of pancreatitis.    Gallbladder and common bile duct:No evidence of cholelithiasis. No evidence of cholecystitis.    Adrenal glands:No adrenal  masses    Kidneys and ureters:No kidney stones. No renal masses.No calculi present within the ureters. Normal caliber ureters.    Urinary bladder:No urinary bladder wall thickening. No bladder masses.    Small bowel:Normal caliber small bowel.    Large bowel: Severe colonic inflammation involving the sigmoid colon of the sigmoid colon with secondary severe inflammation of the adjacent loops of small bowel. No fistulization is present. Small abscess partially within the anterior superior colonic   wall noted measuring 1.9 cm x 1.4 cm. Significant surrounding inflammation of the fat.    Appendix: Normal    GENITOURINARY: Normal appearance of the uterus and adnexa.    Ascites or pneumoperitoneum: Trace free fluid.    Adenopathy:None present    Osseous structures: Degenerative changes of the lumbar spine and the knee hips.    Other findings: None      Impression:      Severe inflammation of the colon with intramural abscess. Findings may indicate diverticulitis or focal colitis. Limited characterization due to intense inflammation. There is secondary severe inflammation of an adjacent small bowel without   fistulization appreciated.            Electronically Signed: Alexi Kirk MD    4/3/2024 9:46 PM EDT    Workstation ID: [id number]    CT Abdomen Pelvis Without Contrast [[id number]] Collected: 04/03/24 1730     Updated: 04/03/24 1749    Narrative:      CT ABDOMEN PELVIS WO CONTRAST    Date of Exam: 4/3/2024 5:25 PM EDT    Indication: Abdominal pain, acute, nonlocalized.    Comparison: None available.    Technique: Axial CT images were obtained of the abdomen and pelvis without the administration of contrast. Sagittal and coronal reconstructions were performed.  Automated exposure control and iterative reconstruction methods were used.      Findings:  Lung Bases:    The visualized lung bases and lower mediastinal structures are unremarkable.      Liver:Liver is normal in size and CT density. No focal  lesions.      Biliary/Gallbladder: The gallbladder is without evidence of radiopaque stones. The biliary tree is nondilated.      Spleen:Spleen is normal in size and CT density.    Pancreas:   Pancreas shows homogeneous density. There is no evidence of pancreatic mass or peripancreatic fluid.      Kidneys: Kidneys are normal in size. There are no stones or hydronephrosis.      Adrenals: Adrenal glands are unremarkable.      Retroperitoneal/Lymph Nodes/Vasculature: No retroperitoneal adenopathy is identified by size criteria.      Gastrointestinal/Mesentery: The stomach is unremarkable. There are few mildly distended loops of small bowel with air-fluid levels. The appendix is within normal limits. There is a long segment of severe diffuse thickening of the sigmoid colon wall with   extensive pericolonic fat stranding. There is also a focally adjacent loop of small bowel in the right mid pelvis with diffuse wall thickening and surrounding inflammatory changes. There is a focal area of loss of the fat planes between the sigmoid colon   and adjacent small bowel loop (image 88 series 2). The lack of contrast limits the characterization. Findings are compatible with sigmoiditis and ileitis. A fistulous tract cannot be excluded. No definite focal fluid collections are seen or free air.   There is no significant free fluid.    There is an omental fat umbilical hernia    Bladder: Bladder is contracted. There is diffuse thickening of the bladder wall. No intraluminal stones    Bony Structures:  Visualized bony structures are consistent with the patient's age.        Impression:      Impression:    1. Long segment of severe diffuse thickening of the sigmoid colon wall with extensive pericolonic fat stranding compatible with sigmoiditis. Adjacent to the inflamed sigmoid colon towards the right there is diffuse thickening of few small bowel loops   with surrounding fat stranding compatible with an ileitis or reactive  inflammatory response. There is a focal area of loss of the fat planes between the sigmoid colon and adjacent small bowel loop at this level. A fistulous tract is not evident but   cannot be excluded. Further evaluation with a CT abdomen pelvis with intravenous and oral contrast recommended    2. No definite focal fluid collections or free air identified            Electronically Signed: Ari Bettencourt MD    4/3/2024 5:47 PM EDT    Workstation ID: [id number]              Assessment & Plan        This is a 59 y.o. female with:    Active and Resolved Problems  Active Hospital Problems    Diagnosis  POA    **Colitis with abscess [K52.9, K63.0]  Yes     Priority: High    Hypokalemia [E87.6]  Yes     Priority: Medium    Elevated LFTs [R79.89]  Yes     Priority: Medium    Acute renal insufficiency [N28.9]  Yes     Priority: Medium    Leukocytosis [D72.829]  Yes     Priority: Medium    Hypothyroidism [E03.9]  Yes    Essential hypertension [I10]  Yes      Resolved Hospital Problems   No resolved problems to display.     Colitis with abscess with severe inflammation per CT radiology report, continue IV Zosyn and IV vancomycin pharmacy to dose with blood cultures pending clear liquid diet, surgical consultation given severity of inflammation per radiology.  2 mg IV morphine every 4 hours as needed 4 mg IV Zofran every 6 hours as needed    Hypokalemia, replacement protocol in place repeat BMP in a.m.    Elevated LFTs likely secondary to above continue to monitor    Acute renal insufficiency, very mild likely secondary to dehydration, avoid nephrotoxic meds trend renal function normal saline IV fluid hydration    Leukocytosis likely secondary to colitis with abscess see plan above febrile    Essential hypertension, reordered home amlodipine reordered home lisinopril and hydrochlorothiazide his creatinine is only 1.06 monitor BP    Hypothyroidism, reordered home levothyroxine      DVT prophylaxis:  Mechanical DVT prophylaxis  orders are present.        The patient desires to be as follows:    CODE STATUS:    Code Status (Patient has no pulse and is not breathing): CPR (Attempt to Resuscitate)  Medical Interventions (Patient has pulse or is breathing): Full Support          Admission Status:  I believe this patient meets inpatient  status.    Expected Length of Stay: pending clinical course     PDMP and Medication Dispenses via Sidebar reviewed and consistent with patient reported medications.    I discussed the patient's findings and my recommendations with patient.      Signature:     This document has been electronically signed by JAIRO Forte on 2024 00:15 EDT   [facility] Hospitalist Team    Electronically signed by Dale Eubanks MD at 24 0043          Emergency Department Notes        Patel Sorenson MD at 24 2205          Subjective   History of Present Illness  Patient is a 59-year-old female complaining of 5-day history of abdominal pain with nausea.  Denies cough fever diarrhea dysuria or other associated complaints.      Review of Systems    Past Medical History:   Diagnosis Date    Acute URI     Hypertension     Hypothyroidism     Overweight        No Known Allergies    No past surgical history on file.    Family History   Problem Relation Age of Onset    Hypertension Mother     Aneurysm Mother     Arthritis Mother     Hypertension Sister     Aneurysm Brother        Social History     Socioeconomic History    Marital status: Single   Tobacco Use    Smoking status: Former     Current packs/day: 0.00     Average packs/day: 0.5 packs/day for 41.4 years (20.7 ttl pk-yrs)     Types: Cigarettes     Start date: 1982     Quit date: 2023     Years since quittin.8     Passive exposure: Past    Smokeless tobacco: Never   Vaping Use    Vaping status: Never Used   Substance and Sexual Activity    Alcohol use: Yes     Comment: SOCIAL    Drug use: Not Currently    Sexual activity: Not  Currently     Partners: Male           Objective   Physical Exam  Neck has no adenopathy JVD or bruits.  Lungs are clear.  Heart has regular rate rhythm without murmur rub or gallop.  Chest is nontender.  Abdomen soft nontender.  Extremity exam unremarkable.  Procedures          ED Course      Results for orders placed or performed during the hospital encounter of 04/03/24   Comprehensive Metabolic Panel    Specimen: Blood   Result Value Ref Range    Glucose 131 (H) 65 - 99 mg/dL    BUN 15 8 - 23 mg/dL    Creatinine 1.06 (H) 0.57 - 1.00 mg/dL    Sodium 133 (L) 136 - 145 mmol/L    Potassium 3.1 (L) 3.5 - 5.2 mmol/L    Chloride 90 (L) 98 - 107 mmol/L    CO2 30.0 (H) 22.0 - 29.0 mmol/L    Calcium 9.2 8.6 - 10.5 mg/dL    Total Protein 7.3 6.0 - 8.5 g/dL    Albumin 4.0 3.5 - 5.2 g/dL    ALT (SGPT) 80 (H) 1 - 33 U/L    AST (SGOT) 53 (H) 1 - 32 U/L    Alkaline Phosphatase 135 (H) 39 - 117 U/L    Total Bilirubin 1.2 0.0 - 1.2 mg/dL    Globulin 3.3 gm/dL    A/G Ratio 1.2 g/dL    BUN/Creatinine Ratio 14.2 7.0 - 25.0    Anion Gap 13.0 5.0 - 15.0 mmol/L    eGFR 59.9 (L) >60.0 mL/min/1.73   Urinalysis With Microscopic If Indicated (No Culture) - Urine, Clean Catch    Specimen: Urine, Clean Catch   Result Value Ref Range    Color, UA Yellow Yellow, Straw    Appearance, UA Cloudy (A) Clear    pH, UA 6.0 5.0 - 8.0    Specific Gravity, UA 1.014 1.005 - 1.030    Glucose, UA Negative Negative    Ketones, UA Trace (A) Negative    Bilirubin, UA Negative Negative    Blood, UA Trace (A) Negative    Protein, UA 30 mg/dL (1+) (A) Negative    Leuk Esterase, UA Trace (A) Negative    Nitrite, UA Negative Negative    Urobilinogen, UA 1.0 E.U./dL 0.2 - 1.0 E.U./dL   CBC Auto Differential    Specimen: Blood   Result Value Ref Range    WBC 32.90 (C) 3.40 - 10.80 10*3/mm3    RBC 3.91 3.77 - 5.28 10*6/mm3    Hemoglobin 11.6 (L) 12.0 - 15.9 g/dL    Hematocrit 35.6 34.0 - 46.6 %    MCV 91.0 79.0 - 97.0 fL    MCH 29.7 26.6 - 33.0 pg    MCHC 32.6 31.5 -  35.7 g/dL    RDW 12.5 12.3 - 15.4 %    RDW-SD 41.4 37.0 - 54.0 fl    MPV 10.6 6.0 - 12.0 fL    Platelets 364 140 - 450 10*3/mm3    Neutrophil % 88.6 (H) 42.7 - 76.0 %    Lymphocyte % 4.7 (L) 19.6 - 45.3 %    Monocyte % 5.4 5.0 - 12.0 %    Eosinophil % 0.1 (L) 0.3 - 6.2 %    Basophil % 0.3 0.0 - 1.5 %    Immature Grans % 0.9 (H) 0.0 - 0.5 %    Neutrophils, Absolute 29.14 (H) 1.70 - 7.00 10*3/mm3    Lymphocytes, Absolute 1.54 0.70 - 3.10 10*3/mm3    Monocytes, Absolute 1.79 (H) 0.10 - 0.90 10*3/mm3    Eosinophils, Absolute 0.04 0.00 - 0.40 10*3/mm3    Basophils, Absolute 0.10 0.00 - 0.20 10*3/mm3    Immature Grans, Absolute 0.29 (H) 0.00 - 0.05 10*3/mm3    nRBC 0.0 0.0 - 0.2 /100 WBC   Urinalysis, Microscopic Only - Urine, Clean Catch    Specimen: Urine, Clean Catch   Result Value Ref Range    RBC, UA 6-10 (A) None Seen, 0-2 /HPF    WBC, UA 3-5 (A) None Seen, 0-2 /HPF    Bacteria, UA None Seen None Seen /HPF    Squamous Epithelial Cells, UA 13-20 (A) None Seen, 0-2 /HPF    Hyaline Casts, UA 3-6 None Seen /LPF    Methodology Automated Microscopy      CT Abdomen Pelvis With Contrast    Result Date: 4/3/2024  Severe inflammation of the colon with intramural abscess. Findings may indicate diverticulitis or focal colitis. Limited characterization due to intense inflammation. There is secondary severe inflammation of an adjacent small bowel without fistulization appreciated. Electronically Signed: Alexi Kirk MD  4/3/2024 9:46 PM EDT  Workstation ID: [id number]    CT Abdomen Pelvis Without Contrast    Result Date: 4/3/2024  Impression: 1. Long segment of severe diffuse thickening of the sigmoid colon wall with extensive pericolonic fat stranding compatible with sigmoiditis. Adjacent to the inflamed sigmoid colon towards the right there is diffuse thickening of few small bowel loops with surrounding fat stranding compatible with an ileitis or reactive inflammatory response. There is a focal area of loss of the fat planes  between the sigmoid colon and adjacent small bowel loop at this level. A fistulous tract is not evident but cannot be excluded. Further evaluation with a CT abdomen pelvis with intravenous and oral contrast recommended 2. No definite focal fluid collections or free air identified Electronically Signed: Ari Bettencourt MD  4/3/2024 5:47 PM EDT  Workstation ID: [id number]    XR Abdomen KUB    Result Date: 4/2/2024  Possible constipation in the appropriate clinical setting. Electronically Signed: Alexi Kirk MD  4/2/2024 6:44 PM EDT  Workstation ID: [id number]                                          Medical Decision Making  My interpretation patient CT scan and pelvis without contrast shows sigmoid colitis.  Per the radiologist this is very extensive they recommend CT scanning abdomen pelvis with oral and IV contrast which does show intramural abscess as well as focal colitis.  BMP shows no renal insufficiency with mild hypokalemia with potassium 3.1.  LFTs show no evidence acute hepatitis.  Patient does have leukocytosis with white count of 32.9 thousand.  She does have left shift with borderline anemia.  Patient blood culture was obtained.  She was started on antibiotics as noted above.  Admitted for further pain control IV antibiotics and evaluation.  I did speak to the on-call hospitalist    Amount and/or Complexity of Data Reviewed  Labs: ordered. Decision-making details documented in ED Course.  Radiology: ordered and independent interpretation performed.    Risk  Prescription drug management.  Decision regarding hospitalization.        Final diagnoses:   Generalized abdominal pain   Colitis       ED Disposition  ED Disposition       ED Disposition   Decision to Admit    Condition   --    Comment   --               No follow-up provider specified.       Medication List      No changes were made to your prescriptions during this visit.            Patel Sorenson MD  04/03/24 3930      Electronically signed by Yas  "MD Patel at 24       Addie Louis, RN at 24 2030          Called CT and asked if they were ready for patient,  CT tech said \"It has to be in her sigmoid colon which takes two or more hours so we are going to give her another 20 minutes.    Electronically signed by Addie Louis RN at 24       Operative/Procedure Notes (last 48 hours)  Notes from 24 1427 through 24 142   No notes of this type exist for this encounter.          Physician Progress Notes (last 48 hours)        Ji Richardson MD at 24 [facility]6              [facility] MEDICINE SERVICE  DAILY PROGRESS NOTE    NAME: Kirby Gunter  : 1965  MRN: [id number]      LOS: 1 day     PROVIDER OF SERVICE: Ji Richardson MD    Chief Complaint: Colitis with abscess    Subjective:     Interval History: Patient complaining of some abdominal pain this morning, although states that overall it is improved.  She did not have much pain medication last night but is asking for some this morning.  She is tolerating some clear liquid diet.    Review of Systems:   Review of Systems    Objective:     Vital Signs  Temp:  [97.9 °F (36.6 °C)-98.3 °F (36.8 °C)] 98.3 °F (36.8 °C)  Heart Rate:  [101-118] 109  Resp:  [15-18] 18  BP: ()/() 94/58  Flow (L/min):  [2-3] 2   Body mass index is 26.85 kg/m².    Physical Exam  Physical Exam  General Appearance:  Alert, cooperative, no distress, appears stated age  Head:  Normocephalic, without obvious abnormality, atraumatic  Eyes:  PERRL, conjunctiva/corneas clear, EOM's intact, fundi benign, both eyes  Ears:  Normal TM's and external ear canals, both ears  Nose: Nares normal, septum midline, mucosa normal, no drainage or sinus tenderness  Throat: Lips, mucosa, and tongue normal; teeth and gums normal  Neck: Supple, symmetrical, trachea midline, no adenopathy, thyroid: not enlarged, symmetric, no tenderness/mass/nodules, no carotid bruit or JVD  Lungs:   Clear to auscultation " bilaterally, respirations unlabored  Heart:  Regular rate and rhythm, S1, S2 normal, no murmur, rub or gallop  Abdomen:  Soft, lower abdominal TTP, bowel sounds active all four quadrants,  no masses, no organomegaly  Extremities: Extremities normal, atraumatic, no cyanosis or edema  Pulses: 2+ and symmetric  Skin: Skin color, texture, turgor normal, no rashes or lesions  Neurologic: Normal      Scheduled Meds   amLODIPine, 5 mg, Oral, Daily  levothyroxine, 88 mcg, Oral, Q AM  lisinopril, 20 mg, Oral, Q24H  piperacillin-tazobactam, 3.375 g, Intravenous, Q8H  sennosides-docusate, 1 tablet, Oral, Daily  [START ON 4/7/2024] vitamin D, 50,000 Units, Oral, Weekly       PRN Meds     albuterol    Morphine    ondansetron    Pharmacy to Dose Zosyn    Potassium Replacement - Follow Nurse / BPA Driven Protocol    [COMPLETED] Insert Peripheral IV **AND** sodium chloride   Infusions  Pharmacy to Dose Zosyn,   sodium chloride, 100 mL/hr, Last Rate: 100 mL/hr (04/04/24 0021)          Diagnostic Data    Results from last 7 days   Lab Units 04/04/24  0538 04/03/24  1631   WBC 10*3/mm3 33.58* 32.90*   HEMOGLOBIN g/dL 11.0* 11.6*   HEMATOCRIT % 34.2 35.6   PLATELETS 10*3/mm3 358 364   GLUCOSE mg/dL 88 131*   CREATININE mg/dL 1.05* 1.06*   BUN mg/dL 19 15   SODIUM mmol/L 134* 133*   POTASSIUM mmol/L 3.8 3.1*   AST (SGOT) U/L  --  53*   ALT (SGPT) U/L  --  80*   ALK PHOS U/L  --  135*   BILIRUBIN mg/dL  --  1.2   ANION GAP mmol/L 11.0 13.0       CT Abdomen Pelvis With Contrast    Result Date: 4/3/2024  Severe inflammation of the colon with intramural abscess. Findings may indicate diverticulitis or focal colitis. Limited characterization due to intense inflammation. There is secondary severe inflammation of an adjacent small bowel without fistulization appreciated. Electronically Signed: Alexi Kirk MD  4/3/2024 9:46 PM EDT  Workstation ID: [id number]    CT Abdomen Pelvis Without Contrast    Result Date: 4/3/2024  Impression: 1. Long  segment of severe diffuse thickening of the sigmoid colon wall with extensive pericolonic fat stranding compatible with sigmoiditis. Adjacent to the inflamed sigmoid colon towards the right there is diffuse thickening of few small bowel loops with surrounding fat stranding compatible with an ileitis or reactive inflammatory response. There is a focal area of loss of the fat planes between the sigmoid colon and adjacent small bowel loop at this level. A fistulous tract is not evident but cannot be excluded. Further evaluation with a CT abdomen pelvis with intravenous and oral contrast recommended 2. No definite focal fluid collections or free air identified Electronically Signed: Ari Bettencourt MD  4/3/2024 5:47 PM EDT  Workstation ID: [id number]    XR Abdomen KUB    Result Date: 4/2/2024  Possible constipation in the appropriate clinical setting. Electronically Signed: Alexi Kirk MD  4/2/2024 6:44 PM EDT  Workstation ID: [id number]       I reviewed the patient's new clinical results.    Assessment/Plan:     Active and Resolved Problems  Active Hospital Problems    Diagnosis  POA    **Colitis with abscess [K52.9, K63.0]  Yes    Hypokalemia [E87.6]  Yes    Elevated LFTs [R79.89]  Yes    Acute renal insufficiency [N28.9]  Yes    Leukocytosis [D72.829]  Yes    Hypothyroidism [E03.9]  Yes    Essential hypertension [I10]  Yes      Resolved Hospital Problems   No resolved problems to display.       Acute colitis with intramural abscess with sepsis (POA)  -Patient has underlying infectious process with colitis and small intramural abscess with significant leukocytosis and tachycardia, all consistent with sepsis  -Continue IV antibiotics with Zosyn but discontinue vancomycin  -She is tolerating some clear liquid diet today  -Discussed with patient regarding possible need for surgical intervention with partial colon resection and colostomy; if patient's symptoms get worse, she would likely require surgical  intervention  -Appreciate surgery consultation    Hypertension  -Continue medications with lisinopril, amlodipine but holding HCTZ    Hypothyroidism  -Continue Synthroid    Mild HERSON  -Renal function appears stable  -Avoid nephrotoxic medications if able    Acute hypokalemia  -Repleted        DVT prophylaxis:  Mechanical DVT prophylaxis orders are present.         Code status is   Code Status and Medical Interventions:   Ordered at: 24 2211     Code Status (Patient has no pulse and is not breathing):    CPR (Attempt to Resuscitate)     Medical Interventions (Patient has pulse or is breathing):    Full Support       Plan for disposition: Pending clinical course.  Patient may require surgical invention on this admission.    Time: 30 minutes    Signature: Electronically signed by Ji Richardson MD, 24, 11:26 EDT.  [facility] Hospitalist Team    Electronically signed by Ji Richardson MD at 24 1129          Consult Notes (last 48 hours)        Kojo Barnett MD at 24 1033        Consult Orders    1. Inpatient General Surgery Consult [[id number]] ordered by Nayeli Sams APRN at 24 2314                   General Surgery Consult Note      Name: Kirby Gunter ADMIT: 4/3/2024   : 1965  PCP: Chava López MD    MRN: [id number] LOS: 1 days   AGE/SEX: 59 y.o. female  ROOM: [address] [place] [address]      Patient Care Team:  Chava López MD as PCP - General  Chava López MD as PCP - Family Medicine  Chief Complaint   Patient presents with    Abdominal Pain     Abd pain, started on Friday, pt is nauseated, pt was seen at urgent care and given medications for constipation that worked but still feeling pain.         Subjective   Patient is a 59-year-old woman here with abdominal pain.  Began having pain last Friday.  Is generalized.  Has continued to have bowel movements however it has been more liquid.  Has never had a colonoscopy.  CT scan in the ER with  a large amount of inflammation of the sigmoid colon and surrounding tissues.  Concern for intramural abscess.  White count elevated to 33.    Past Medical History:   Diagnosis Date    Acute URI     Hypertension     Hypothyroidism     Overweight      No past surgical history on file.  Family History   Problem Relation Age of Onset    Hypertension Mother     Aneurysm Mother     Arthritis Mother     Hypertension Sister     Aneurysm Brother        Social History     Tobacco Use    Smoking status: Former     Current packs/day: 0.00     Average packs/day: 0.5 packs/day for 41.4 years (20.7 ttl pk-yrs)     Types: Cigarettes     Start date: 1982     Quit date: 2023     Years since quittin.8     Passive exposure: Past    Smokeless tobacco: Never   Vaping Use    Vaping status: Never Used   Substance Use Topics    Alcohol use: Yes     Comment: SOCIAL    Drug use: Not Currently     Medications Prior to Admission   Medication Sig Dispense Refill Last Dose    amLODIPine (NORVASC) 5 MG tablet TAKE 1 TABLET BY MOUTH DAILY 90 tablet 1 4/3/2024    levothyroxine (SYNTHROID, LEVOTHROID) 88 MCG tablet Take 1 tablet by mouth Daily. 90 tablet 1 4/3/2024    lisinopril-hydrochlorothiazide (PRINZIDE,ZESTORETIC) 20-25 MG per tablet Take 1 tablet by mouth Daily. 90 tablet 1 4/3/2024    sennosides-docusate (senna-docusate sodium) 8.6-50 MG per tablet Take 1 tablet by mouth Daily for 5 days. 5 tablet 0 2024    albuterol sulfate  (90 Base) MCG/ACT inhaler Inhale 2 puffs Every 4 (Four) Hours As Needed for Wheezing. 8 g 0     vitamin D (ERGOCALCIFEROL) 1.25 MG (55754 UT) capsule capsule Take 1 capsule by mouth 1 (One) Time Per Week. Sundays   3/31/2024     amLODIPine, 5 mg, Oral, Daily  levothyroxine, 88 mcg, Oral, Q AM  lisinopril, 20 mg, Oral, Q24H  piperacillin-tazobactam, 3.375 g, Intravenous, Q8H  sennosides-docusate, 1 tablet, Oral, Daily  vancomycin, 750 mg, Intravenous, Q12H  [START ON 2024] vitamin D, 50,000  "Units, Oral, Weekly      Pharmacy to dose vancomycin,   Pharmacy to Dose Zosyn,   sodium chloride, 100 mL/hr, Last Rate: 100 mL/hr (04/04/24 0021)        albuterol    Morphine    ondansetron    Pharmacy to dose vancomycin    Pharmacy to Dose Zosyn    Potassium Replacement - Follow Nurse / BPA Driven Protocol    [COMPLETED] Insert Peripheral IV **AND** sodium chloride  Patient has no known allergies.    Review of Systems   Gastrointestinal:  Positive for abdominal pain, diarrhea and nausea.        Objective     Vital Signs and Labs:  Vital Signs Patient Vitals for the past 24 hrs:   BP Temp Temp src Pulse Resp SpO2 Height Weight   04/04/24 0843 94/58 98.3 °F (36.8 °C) Oral -- 18 95 % -- --   04/04/24 0519 104/64 -- -- 109 16 95 % -- --   04/04/24 0042 97/52 -- Oral 101 15 97 % -- --   04/03/24 2214 102/54 -- -- 110 -- 97 % -- --   04/03/24 2146 108/58 -- -- 107 -- 96 % -- --   04/03/24 2014 107/65 -- -- 109 -- -- -- --   04/03/24 1846 110/62 -- -- 104 -- -- -- --   04/03/24 1816 113/69 -- -- 105 -- -- -- --   04/03/24 1707 105/53 -- -- 102 -- 92 % -- --   04/03/24 1626 (!) 141/119 -- -- 117 -- 95 % -- --   04/03/24 1559 116/68 97.9 °F (36.6 °C) Oral 118 18 96 % 157.5 cm (62\") 66.6 kg (146 lb 13.2 oz)     I/O:  I/O last 3 completed shifts:  In: 1157.9 [P.O.:480; I.V.:677.9]  Out: -     Physical Exam:  Physical Exam  Cardiovascular:      Rate and Rhythm: Normal rate.   Pulmonary:      Effort: Pulmonary effort is normal.   Abdominal:      General: Abdomen is flat.      Comments: Diffusely tender to palpation however not peritonitic   Neurological:      Mental Status: She is alert.         CBC    Results from last 7 days   Lab Units 04/04/24  0538 04/03/24  1631   WBC 10*3/mm3 33.58* 32.90*   HEMOGLOBIN g/dL 11.0* 11.6*   PLATELETS 10*3/mm3 358 364     CMP   Results from last 7 days   Lab Units 04/04/24  0538 04/03/24  1631   SODIUM mmol/L 134* 133*   POTASSIUM mmol/L 3.8 3.1*   CHLORIDE mmol/L 93* 90*   CO2 mmol/L 30.0* " 30.0*   BUN mg/dL 19 15   CREATININE mg/dL 1.05* 1.06*   GLUCOSE mg/dL 88 131*   ALBUMIN g/dL  --  4.0   BILIRUBIN mg/dL  --  1.2   ALK PHOS U/L  --  135*   AST (SGOT) U/L  --  53*   ALT (SGPT) U/L  --  80*     Radiology(recent) CT Abdomen Pelvis With Contrast    Result Date: 4/3/2024  Severe inflammation of the colon with intramural abscess. Findings may indicate diverticulitis or focal colitis. Limited characterization due to intense inflammation. There is secondary severe inflammation of an adjacent small bowel without fistulization appreciated. Electronically Signed: Alexi Kirk MD  4/3/2024 9:46 PM EDT  Workstation ID: [id number]    CT Abdomen Pelvis Without Contrast    Result Date: 4/3/2024  Impression: 1. Long segment of severe diffuse thickening of the sigmoid colon wall with extensive pericolonic fat stranding compatible with sigmoiditis. Adjacent to the inflamed sigmoid colon towards the right there is diffuse thickening of few small bowel loops with surrounding fat stranding compatible with an ileitis or reactive inflammatory response. There is a focal area of loss of the fat planes between the sigmoid colon and adjacent small bowel loop at this level. A fistulous tract is not evident but cannot be excluded. Further evaluation with a CT abdomen pelvis with intravenous and oral contrast recommended 2. No definite focal fluid collections or free air identified Electronically Signed: Ari Bettencourt MD  4/3/2024 5:47 PM EDT  Workstation ID: [id number]    XR Abdomen KUB    Result Date: 4/2/2024  Possible constipation in the appropriate clinical setting. Electronically Signed: Alexi Kirk MD  4/2/2024 6:44 PM EDT  Workstation ID: [id number]     I reviewed the patient's new clinical results.  I reviewed the patient's new imaging results and agree with the interpretation.  I reviewed the patient's other test results and agree with the interpretation    Assessment & Plan       Colitis with abscess    Essential  hypertension    Hypothyroidism    Hypokalemia    Elevated LFTs    Acute renal insufficiency    Leukocytosis      59 y.o. female with diverticulitis    CT reviewed and agree with findings.  Given the large amount of inflammation as well as abdominal tenderness suspect that she will require surgery this admission.  However she is still having bowel function and is currently tolerating clears.  Will attempt a trial of nonoperative management however if her pain persists or worsen she will need exploration with sigmoidectomy and end colostomy.  If she is able to be managed nonoperatively she will need an outpatient colonoscopy and would benefit from elective sigmoidectomy.  Will continue to allow clears today however counseled that if she begins to get nauseous she should stop.    Kojo Barnett MD  04/04/24  10:33 EDT      Electronically signed by Kojo Barnett MD at 04/04/24 1037

## 2024-04-04 NOTE — CASE MANAGEMENT/SOCIAL WORK
Discharge Planning Assessment   Ar     Patient Name: Kirby Gunter  MRN: [id number]  Today's Date: 4/4/2024    Admit Date: 4/3/2024    Plan: Home.  Plans to drive self home.   Discharge Needs Assessment       Row Name 04/04/24 1513       Living Environment    People in Home alone    Current Living Arrangements apartment    Potentially Unsafe Housing Conditions none    In the past 12 months has the electric, gas, oil, or water company threatened to shut off services in your home? No    Primary Care Provided by self    Provides Primary Care For no one    Family Caregiver if Needed significant other    Quality of Family Relationships helpful;involved;supportive    Able to Return to Prior Arrangements yes       Resource/Environmental Concerns    Resource/Environmental Concerns none    Transportation Concerns none       Transportation Needs    In the past 12 months, has lack of transportation kept you from medical appointments or from getting medications? no    In the past 12 months, has lack of transportation kept you from meetings, work, or from getting things needed for daily living? No       Food Insecurity    Within the past 12 months, you worried that your food would run out before you got the money to buy more. Never true    Within the past 12 months, the food you bought just didn't last and you didn't have money to get more. Never true       Transition Planning    Patient/Family Anticipates Transition to home    Patient/Family Anticipated Services at Transition none    Transportation Anticipated car, drives self;family or friend will provide       Discharge Needs Assessment    Readmission Within the Last 30 Days no previous admission in last 30 days    Equipment Currently Used at Home none;oxygen  o2 2L HS supplied by [employer]    Concerns to be Addressed care coordination/care conferences;discharge planning    Anticipated Changes Related to Illness none    Equipment Needed After Discharge none    Provided  Post Acute Provider List? N/A    N/A Provider List Comment denies dc needs                   Discharge Plan       Row Name 04/04/24 1515       Plan    Plan Home.  Plans to drive self home.    Patient/Family in Agreement with Plan yes    Plan Comments Patient lives at home with alone Patient drives, will  transport self  at discharge. Patient performs ADL. PCP and pharmacy confirmed. Denies financial assistance needs for medication and/or food. Denies HH and/or rehab needs.                  Continued Care and Services - Admitted Since 4/3/2024    No active coordination exists for this encounter.       Expected Discharge Date and Time       Expected Discharge Date Expected Discharge Time    Apr 5, 2024            Demographic Summary       Row Name 04/04/24 1513       General Information    Admission Type inpatient    Arrived From emergency department    Referral Source admission list    Reason for Consult discharge planning    Preferred Language English       Contact Information    Permission Granted to Share Info With                    Functional Status       Row Name 04/04/24 1513       Functional Status    Usual Activity Tolerance good    Current Activity Tolerance good       Functional Status, IADL    Medications independent    Meal Preparation independent    Housekeeping independent    Laundry independent    Shopping independent       Mental Status    General Appearance WDL WDL       Mental Status Summary    Recent Changes in Mental Status/Cognitive Functioning no changes                        Aundrea Zhou, RN

## 2024-04-04 NOTE — PROGRESS NOTES
"Pharmacy Antimicrobial Dosing Service    Subjective:  Kirby Gunter is a 59 y.o.female admitted with abdominal pain with nausea. Pharmacy has been consulted to dose Vancomycin and Piperacillin/Tazobactam for possible intra-abdominal infection.      Assessment/Plan    1. Day #1 Vancomycin: Goal -600 mcg*h/mL. 1250mg (~19mg/kg ABW) IV x1 dose followed by 750mg (~11mg/kg ABW) IV q12h.  Random level ordered for 4/4 at 1800.    2. Day #1 Piperacillin/Tazobactam: 3.375gm IV q8h for estCrCl > 20 mL/min.    Will continue to monitor drug levels, renal function, culture and sensitivities, and patient clinical status.       Objective:  Relevant clinical data and objective history reviewed:  157.5 cm (62\")   66.6 kg (146 lb 13.2 oz)   Ideal body weight: 50.1 kg (110 lb 7.2 oz)  Adjusted ideal body weight: 56.7 kg (125 lb)  Body mass index is 26.85 kg/m².        Results from last 7 days   Lab Units 04/03/24  1631   CREATININE mg/dL 1.06*     Estimated Creatinine Clearance: 49.9 mL/min (A) (by C-G formula based on SCr of 1.06 mg/dL (H)).  No intake/output data recorded.    Results from last 7 days   Lab Units 04/03/24  1631   WBC 10*3/mm3 32.90*     Temperature    04/03/24 1559   Temp: 97.9 °F (36.6 °C)     Baseline culture/source/susceptibility:  Microbiology Results (last 10 days)       ** No results found for the last 240 hours. **            Vineet Hassan  04/04/24 04:05 EDT    "

## 2024-04-04 NOTE — CONSULTS
General Surgery Consult Note      Name: Kirby Gunter ADMIT: 4/3/2024   : 1965  PCP: Chava López MD    MRN: [id number] LOS: 1 days   AGE/SEX: 59 y.o. female  ROOM: [address] [place] [address]      Patient Care Team:  Chava López MD as PCP - General  Chava López MD as PCP - Family Medicine  Chief Complaint   Patient presents with    Abdominal Pain     Abd pain, started on Friday, pt is nauseated, pt was seen at urgent care and given medications for constipation that worked but still feeling pain.         Subjective   Patient is a 59-year-old woman here with abdominal pain.  Began having pain last Friday.  Is generalized.  Has continued to have bowel movements however it has been more liquid.  Has never had a colonoscopy.  CT scan in the ER with a large amount of inflammation of the sigmoid colon and surrounding tissues.  Concern for intramural abscess.  White count elevated to 33.    Past Medical History:   Diagnosis Date    Acute URI     Hypertension     Hypothyroidism     Overweight      No past surgical history on file.  Family History   Problem Relation Age of Onset    Hypertension Mother     Aneurysm Mother     Arthritis Mother     Hypertension Sister     Aneurysm Brother        Social History     Tobacco Use    Smoking status: Former     Current packs/day: 0.00     Average packs/day: 0.5 packs/day for 41.4 years (20.7 ttl pk-yrs)     Types: Cigarettes     Start date: 1982     Quit date: 2023     Years since quittin.8     Passive exposure: Past    Smokeless tobacco: Never   Vaping Use    Vaping status: Never Used   Substance Use Topics    Alcohol use: Yes     Comment: SOCIAL    Drug use: Not Currently     Medications Prior to Admission   Medication Sig Dispense Refill Last Dose    amLODIPine (NORVASC) 5 MG tablet TAKE 1 TABLET BY MOUTH DAILY 90 tablet 1 4/3/2024    levothyroxine (SYNTHROID, LEVOTHROID) 88 MCG tablet Take 1 tablet by mouth Daily. 90 tablet 1  4/3/2024    lisinopril-hydrochlorothiazide (PRINZIDE,ZESTORETIC) 20-25 MG per tablet Take 1 tablet by mouth Daily. 90 tablet 1 4/3/2024    sennosides-docusate (senna-docusate sodium) 8.6-50 MG per tablet Take 1 tablet by mouth Daily for 5 days. 5 tablet 0 4/2/2024    albuterol sulfate  (90 Base) MCG/ACT inhaler Inhale 2 puffs Every 4 (Four) Hours As Needed for Wheezing. 8 g 0     vitamin D (ERGOCALCIFEROL) 1.25 MG (48489 UT) capsule capsule Take 1 capsule by mouth 1 (One) Time Per Week. Sundays   3/31/2024     amLODIPine, 5 mg, Oral, Daily  levothyroxine, 88 mcg, Oral, Q AM  lisinopril, 20 mg, Oral, Q24H  piperacillin-tazobactam, 3.375 g, Intravenous, Q8H  sennosides-docusate, 1 tablet, Oral, Daily  vancomycin, 750 mg, Intravenous, Q12H  [START ON 4/7/2024] vitamin D, 50,000 Units, Oral, Weekly      Pharmacy to dose vancomycin,   Pharmacy to Dose Zosyn,   sodium chloride, 100 mL/hr, Last Rate: 100 mL/hr (04/04/24 0021)        albuterol    Morphine    ondansetron    Pharmacy to dose vancomycin    Pharmacy to Dose Zosyn    Potassium Replacement - Follow Nurse / BPA Driven Protocol    [COMPLETED] Insert Peripheral IV **AND** sodium chloride  Patient has no known allergies.    Review of Systems   Gastrointestinal:  Positive for abdominal pain, diarrhea and nausea.        Objective     Vital Signs and Labs:  Vital Signs Patient Vitals for the past 24 hrs:   BP Temp Temp src Pulse Resp SpO2 Height Weight   04/04/24 0843 94/58 98.3 °F (36.8 °C) Oral -- 18 95 % -- --   04/04/24 0519 104/64 -- -- 109 16 95 % -- --   04/04/24 0042 97/52 -- Oral 101 15 97 % -- --   04/03/24 2214 102/54 -- -- 110 -- 97 % -- --   04/03/24 2146 108/58 -- -- 107 -- 96 % -- --   04/03/24 2014 107/65 -- -- 109 -- -- -- --   04/03/24 1846 110/62 -- -- 104 -- -- -- --   04/03/24 1816 113/69 -- -- 105 -- -- -- --   04/03/24 1707 105/53 -- -- 102 -- 92 % -- --   04/03/24 1626 (!) 141/119 -- -- 117 -- 95 % -- --   04/03/24 1559 116/68 97.9 °F  "(36.6 °C) Oral 118 18 96 % 157.5 cm (62\") 66.6 kg (146 lb 13.2 oz)     I/O:  I/O last 3 completed shifts:  In: 1157.9 [P.O.:480; I.V.:677.9]  Out: -     Physical Exam:  Physical Exam  Cardiovascular:      Rate and Rhythm: Normal rate.   Pulmonary:      Effort: Pulmonary effort is normal.   Abdominal:      General: Abdomen is flat.      Comments: Diffusely tender to palpation however not peritonitic   Neurological:      Mental Status: She is alert.         CBC    Results from last 7 days   Lab Units 04/04/24  0538 04/03/24  1631   WBC 10*3/mm3 33.58* 32.90*   HEMOGLOBIN g/dL 11.0* 11.6*   PLATELETS 10*3/mm3 358 364     CMP   Results from last 7 days   Lab Units 04/04/24  0538 04/03/24  1631   SODIUM mmol/L 134* 133*   POTASSIUM mmol/L 3.8 3.1*   CHLORIDE mmol/L 93* 90*   CO2 mmol/L 30.0* 30.0*   BUN mg/dL 19 15   CREATININE mg/dL 1.05* 1.06*   GLUCOSE mg/dL 88 131*   ALBUMIN g/dL  --  4.0   BILIRUBIN mg/dL  --  1.2   ALK PHOS U/L  --  135*   AST (SGOT) U/L  --  53*   ALT (SGPT) U/L  --  80*     Radiology(recent) CT Abdomen Pelvis With Contrast    Result Date: 4/3/2024  Severe inflammation of the colon with intramural abscess. Findings may indicate diverticulitis or focal colitis. Limited characterization due to intense inflammation. There is secondary severe inflammation of an adjacent small bowel without fistulization appreciated. Electronically Signed: Alexi Kirk MD  4/3/2024 9:46 PM EDT  Workstation ID: [id number]    CT Abdomen Pelvis Without Contrast    Result Date: 4/3/2024  Impression: 1. Long segment of severe diffuse thickening of the sigmoid colon wall with extensive pericolonic fat stranding compatible with sigmoiditis. Adjacent to the inflamed sigmoid colon towards the right there is diffuse thickening of few small bowel loops with surrounding fat stranding compatible with an ileitis or reactive inflammatory response. There is a focal area of loss of the fat planes between the sigmoid colon and adjacent " small bowel loop at this level. A fistulous tract is not evident but cannot be excluded. Further evaluation with a CT abdomen pelvis with intravenous and oral contrast recommended 2. No definite focal fluid collections or free air identified Electronically Signed: Ari Bettencourt MD  4/3/2024 5:47 PM EDT  Workstation ID: [id number]    XR Abdomen KUB    Result Date: 4/2/2024  Possible constipation in the appropriate clinical setting. Electronically Signed: Alexi Kirk MD  4/2/2024 6:44 PM EDT  Workstation ID: [id number]     I reviewed the patient's new clinical results.  I reviewed the patient's new imaging results and agree with the interpretation.  I reviewed the patient's other test results and agree with the interpretation    Assessment & Plan       Colitis with abscess    Essential hypertension    Hypothyroidism    Hypokalemia    Elevated LFTs    Acute renal insufficiency    Leukocytosis      59 y.o. female with diverticulitis    CT reviewed and agree with findings.  Given the large amount of inflammation as well as abdominal tenderness suspect that she will require surgery this admission.  However she is still having bowel function and is currently tolerating clears.  Will attempt a trial of nonoperative management however if her pain persists or worsen she will need exploration with sigmoidectomy and end colostomy.  If she is able to be managed nonoperatively she will need an outpatient colonoscopy and would benefit from elective sigmoidectomy.  Will continue to allow clears today however counseled that if she begins to get nauseous she should stop.    Kojo Barnett MD  04/04/24  10:33 EDT

## 2024-04-04 NOTE — ED NOTES
"Called CT and asked if they were ready for patient,  CT tech said \"It has to be in her sigmoid colon which takes two or more hours so we are going to give her another 20 minutes.  "

## 2024-04-04 NOTE — ED PROVIDER NOTES
Subjective   History of Present Illness  Patient is a 59-year-old female complaining of 5-day history of abdominal pain with nausea.  Denies cough fever diarrhea dysuria or other associated complaints.      Review of Systems    Past Medical History:   Diagnosis Date    Acute URI     Hypertension     Hypothyroidism     Overweight        No Known Allergies    No past surgical history on file.    Family History   Problem Relation Age of Onset    Hypertension Mother     Aneurysm Mother     Arthritis Mother     Hypertension Sister     Aneurysm Brother        Social History     Socioeconomic History    Marital status: Single   Tobacco Use    Smoking status: Former     Current packs/day: 0.00     Average packs/day: 0.5 packs/day for 41.4 years (20.7 ttl pk-yrs)     Types: Cigarettes     Start date: 1982     Quit date: 2023     Years since quittin.8     Passive exposure: Past    Smokeless tobacco: Never   Vaping Use    Vaping status: Never Used   Substance and Sexual Activity    Alcohol use: Yes     Comment: SOCIAL    Drug use: Not Currently    Sexual activity: Not Currently     Partners: Male           Objective   Physical Exam  Neck has no adenopathy JVD or bruits.  Lungs are clear.  Heart has regular rate rhythm without murmur rub or gallop.  Chest is nontender.  Abdomen soft nontender.  Extremity exam unremarkable.  Procedures           ED Course      Results for orders placed or performed during the hospital encounter of 24   Comprehensive Metabolic Panel    Specimen: Blood   Result Value Ref Range    Glucose 131 (H) 65 - 99 mg/dL    BUN 15 8 - 23 mg/dL    Creatinine 1.06 (H) 0.57 - 1.00 mg/dL    Sodium 133 (L) 136 - 145 mmol/L    Potassium 3.1 (L) 3.5 - 5.2 mmol/L    Chloride 90 (L) 98 - 107 mmol/L    CO2 30.0 (H) 22.0 - 29.0 mmol/L    Calcium 9.2 8.6 - 10.5 mg/dL    Total Protein 7.3 6.0 - 8.5 g/dL    Albumin 4.0 3.5 - 5.2 g/dL    ALT (SGPT) 80 (H) 1 - 33 U/L    AST (SGOT) 53 (H) 1 - 32 U/L     Alkaline Phosphatase 135 (H) 39 - 117 U/L    Total Bilirubin 1.2 0.0 - 1.2 mg/dL    Globulin 3.3 gm/dL    A/G Ratio 1.2 g/dL    BUN/Creatinine Ratio 14.2 7.0 - 25.0    Anion Gap 13.0 5.0 - 15.0 mmol/L    eGFR 59.9 (L) >60.0 mL/min/1.73   Urinalysis With Microscopic If Indicated (No Culture) - Urine, Clean Catch    Specimen: Urine, Clean Catch   Result Value Ref Range    Color, UA Yellow Yellow, Straw    Appearance, UA Cloudy (A) Clear    pH, UA 6.0 5.0 - 8.0    Specific Gravity, UA 1.014 1.005 - 1.030    Glucose, UA Negative Negative    Ketones, UA Trace (A) Negative    Bilirubin, UA Negative Negative    Blood, UA Trace (A) Negative    Protein, UA 30 mg/dL (1+) (A) Negative    Leuk Esterase, UA Trace (A) Negative    Nitrite, UA Negative Negative    Urobilinogen, UA 1.0 E.U./dL 0.2 - 1.0 E.U./dL   CBC Auto Differential    Specimen: Blood   Result Value Ref Range    WBC 32.90 (C) 3.40 - 10.80 10*3/mm3    RBC 3.91 3.77 - 5.28 10*6/mm3    Hemoglobin 11.6 (L) 12.0 - 15.9 g/dL    Hematocrit 35.6 34.0 - 46.6 %    MCV 91.0 79.0 - 97.0 fL    MCH 29.7 26.6 - 33.0 pg    MCHC 32.6 31.5 - 35.7 g/dL    RDW 12.5 12.3 - 15.4 %    RDW-SD 41.4 37.0 - 54.0 fl    MPV 10.6 6.0 - 12.0 fL    Platelets 364 140 - 450 10*3/mm3    Neutrophil % 88.6 (H) 42.7 - 76.0 %    Lymphocyte % 4.7 (L) 19.6 - 45.3 %    Monocyte % 5.4 5.0 - 12.0 %    Eosinophil % 0.1 (L) 0.3 - 6.2 %    Basophil % 0.3 0.0 - 1.5 %    Immature Grans % 0.9 (H) 0.0 - 0.5 %    Neutrophils, Absolute 29.14 (H) 1.70 - 7.00 10*3/mm3    Lymphocytes, Absolute 1.54 0.70 - 3.10 10*3/mm3    Monocytes, Absolute 1.79 (H) 0.10 - 0.90 10*3/mm3    Eosinophils, Absolute 0.04 0.00 - 0.40 10*3/mm3    Basophils, Absolute 0.10 0.00 - 0.20 10*3/mm3    Immature Grans, Absolute 0.29 (H) 0.00 - 0.05 10*3/mm3    nRBC 0.0 0.0 - 0.2 /100 WBC   Urinalysis, Microscopic Only - Urine, Clean Catch    Specimen: Urine, Clean Catch   Result Value Ref Range    RBC, UA 6-10 (A) None Seen, 0-2 /HPF    WBC, UA 3-5  (A) None Seen, 0-2 /HPF    Bacteria, UA None Seen None Seen /HPF    Squamous Epithelial Cells, UA 13-20 (A) None Seen, 0-2 /HPF    Hyaline Casts, UA 3-6 None Seen /LPF    Methodology Automated Microscopy      CT Abdomen Pelvis With Contrast    Result Date: 4/3/2024  Severe inflammation of the colon with intramural abscess. Findings may indicate diverticulitis or focal colitis. Limited characterization due to intense inflammation. There is secondary severe inflammation of an adjacent small bowel without fistulization appreciated. Electronically Signed: Alexi Kirk MD  4/3/2024 9:46 PM EDT  Workstation ID: [id number]    CT Abdomen Pelvis Without Contrast    Result Date: 4/3/2024  Impression: 1. Long segment of severe diffuse thickening of the sigmoid colon wall with extensive pericolonic fat stranding compatible with sigmoiditis. Adjacent to the inflamed sigmoid colon towards the right there is diffuse thickening of few small bowel loops with surrounding fat stranding compatible with an ileitis or reactive inflammatory response. There is a focal area of loss of the fat planes between the sigmoid colon and adjacent small bowel loop at this level. A fistulous tract is not evident but cannot be excluded. Further evaluation with a CT abdomen pelvis with intravenous and oral contrast recommended 2. No definite focal fluid collections or free air identified Electronically Signed: Ari Bettencourt MD  4/3/2024 5:47 PM EDT  Workstation ID: [id number]    XR Abdomen KUB    Result Date: 4/2/2024  Possible constipation in the appropriate clinical setting. Electronically Signed: Alexi Kirk MD  4/2/2024 6:44 PM EDT  Workstation ID: [id number]                                          Medical Decision Making  My interpretation patient CT scan and pelvis without contrast shows sigmoid colitis.  Per the radiologist this is very extensive they recommend CT scanning abdomen pelvis with oral and IV contrast which does show intramural abscess  as well as focal colitis.  BMP shows no renal insufficiency with mild hypokalemia with potassium 3.1.  LFTs show no evidence acute hepatitis.  Patient does have leukocytosis with white count of 32.9 thousand.  She does have left shift with borderline anemia.  Patient blood culture was obtained.  She was started on antibiotics as noted above.  Admitted for further pain control IV antibiotics and evaluation.  I did speak to the on-call hospitalist    Amount and/or Complexity of Data Reviewed  Labs: ordered. Decision-making details documented in ED Course.  Radiology: ordered and independent interpretation performed.    Risk  Prescription drug management.  Decision regarding hospitalization.        Final diagnoses:   Generalized abdominal pain   Colitis       ED Disposition  ED Disposition       ED Disposition   Decision to Admit    Condition   --    Comment   --               No follow-up provider specified.       Medication List      No changes were made to your prescriptions during this visit.            Patel Sorenson MD  04/03/24 6719

## 2024-04-05 ENCOUNTER — APPOINTMENT (OUTPATIENT)
Dept: CT IMAGING | Facility: HOSPITAL | Age: 59
End: 2024-04-05
Payer: MEDICAID

## 2024-04-05 LAB
ANION GAP SERPL CALCULATED.3IONS-SCNC: 12 MMOL/L (ref 5–15)
BUN SERPL-MCNC: 25 MG/DL (ref 8–23)
BUN/CREAT SERPL: 28.1 (ref 7–25)
C DIFF GDH + TOXINS A+B STL QL IA.RAPID: NEGATIVE
C DIFF GDH + TOXINS A+B STL QL IA.RAPID: NEGATIVE
CALCIUM SPEC-SCNC: 8.7 MG/DL (ref 8.6–10.5)
CHLORIDE SERPL-SCNC: 96 MMOL/L (ref 98–107)
CO2 SERPL-SCNC: 25 MMOL/L (ref 22–29)
CREAT SERPL-MCNC: 0.89 MG/DL (ref 0.57–1)
DEPRECATED RDW RBC AUTO: 45.1 FL (ref 37–54)
EGFRCR SERPLBLD CKD-EPI 2021: 73.9 ML/MIN/1.73
ERYTHROCYTE [DISTWIDTH] IN BLOOD BY AUTOMATED COUNT: 13.2 % (ref 12.3–15.4)
GIANT PLATELETS: ABNORMAL
GLUCOSE SERPL-MCNC: 83 MG/DL (ref 65–99)
HCT VFR BLD AUTO: 29.5 % (ref 34–46.6)
HGB BLD-MCNC: 9.3 G/DL (ref 12–15.9)
LYMPHOCYTES # BLD MANUAL: 2.52 10*3/MM3 (ref 0.7–3.1)
LYMPHOCYTES NFR BLD MANUAL: 3 % (ref 5–12)
MCH RBC QN AUTO: 29.6 PG (ref 26.6–33)
MCHC RBC AUTO-ENTMCNC: 31.5 G/DL (ref 31.5–35.7)
MCV RBC AUTO: 93.9 FL (ref 79–97)
MONOCYTES # BLD: 0.94 10*3/MM3 (ref 0.1–0.9)
NEUTROPHILS # BLD AUTO: 28 10*3/MM3 (ref 1.7–7)
NEUTROPHILS NFR BLD MANUAL: 88 % (ref 42.7–76)
NEUTS BAND NFR BLD MANUAL: 1 % (ref 0–5)
NEUTS VAC BLD QL SMEAR: ABNORMAL
PLATELET # BLD AUTO: 317 10*3/MM3 (ref 140–450)
PMV BLD AUTO: 10.4 FL (ref 6–12)
POTASSIUM SERPL-SCNC: 3.7 MMOL/L (ref 3.5–5.2)
RBC # BLD AUTO: 3.14 10*6/MM3 (ref 3.77–5.28)
RBC MORPH BLD: NORMAL
SCAN SLIDE: NORMAL
SODIUM SERPL-SCNC: 133 MMOL/L (ref 136–145)
VARIANT LYMPHS NFR BLD MANUAL: 8 % (ref 19.6–45.3)
WBC NRBC COR # BLD AUTO: 31.46 10*3/MM3 (ref 3.4–10.8)

## 2024-04-05 PROCEDURE — 85007 BL SMEAR W/DIFF WBC COUNT: CPT | Performed by: NURSE PRACTITIONER

## 2024-04-05 PROCEDURE — 25010000002 MICAFUNGIN SODIUM 100 MG RECONSTITUTED SOLUTION 1 EACH VIAL: Performed by: INTERNAL MEDICINE

## 2024-04-05 PROCEDURE — 85025 COMPLETE CBC W/AUTO DIFF WBC: CPT | Performed by: NURSE PRACTITIONER

## 2024-04-05 PROCEDURE — 25010000002 MORPHINE PER 10 MG: Performed by: NURSE PRACTITIONER

## 2024-04-05 PROCEDURE — 25010000002 PIPERACILLIN SOD-TAZOBACTAM PER 1 G: Performed by: NURSE PRACTITIONER

## 2024-04-05 PROCEDURE — 99232 SBSQ HOSP IP/OBS MODERATE 35: CPT | Performed by: STUDENT IN AN ORGANIZED HEALTH CARE EDUCATION/TRAINING PROGRAM

## 2024-04-05 PROCEDURE — 87324 CLOSTRIDIUM AG IA: CPT | Performed by: STUDENT IN AN ORGANIZED HEALTH CARE EDUCATION/TRAINING PROGRAM

## 2024-04-05 PROCEDURE — 25510000001 IOPAMIDOL PER 1 ML: Performed by: FAMILY MEDICINE

## 2024-04-05 PROCEDURE — 87449 NOS EACH ORGANISM AG IA: CPT | Performed by: STUDENT IN AN ORGANIZED HEALTH CARE EDUCATION/TRAINING PROGRAM

## 2024-04-05 PROCEDURE — 25810000003 SODIUM CHLORIDE 0.9 % SOLUTION: Performed by: NURSE PRACTITIONER

## 2024-04-05 PROCEDURE — 74177 CT ABD & PELVIS W/CONTRAST: CPT

## 2024-04-05 PROCEDURE — 80048 BASIC METABOLIC PNL TOTAL CA: CPT | Performed by: NURSE PRACTITIONER

## 2024-04-05 RX ADMIN — DOCUSATE SODIUM AND SENNOSIDES 1 TABLET: 8.6; 5 TABLET, FILM COATED ORAL at 09:37

## 2024-04-05 RX ADMIN — PIPERACILLIN AND TAZOBACTAM 3.38 G: 3; .375 INJECTION, POWDER, FOR SOLUTION INTRAVENOUS at 14:41

## 2024-04-05 RX ADMIN — MORPHINE SULFATE 2 MG: 2 INJECTION, SOLUTION INTRAMUSCULAR; INTRAVENOUS at 19:12

## 2024-04-05 RX ADMIN — LISINOPRIL 20 MG: 20 TABLET ORAL at 09:37

## 2024-04-05 RX ADMIN — MORPHINE SULFATE 2 MG: 2 INJECTION, SOLUTION INTRAMUSCULAR; INTRAVENOUS at 13:07

## 2024-04-05 RX ADMIN — SODIUM CHLORIDE 100 ML/HR: 9 INJECTION, SOLUTION INTRAVENOUS at 19:12

## 2024-04-05 RX ADMIN — IOPAMIDOL 100 ML: 755 INJECTION, SOLUTION INTRAVENOUS at 11:06

## 2024-04-05 RX ADMIN — AMLODIPINE BESYLATE 5 MG: 5 TABLET ORAL at 09:37

## 2024-04-05 RX ADMIN — PIPERACILLIN AND TAZOBACTAM 3.38 G: 3; .375 INJECTION, POWDER, FOR SOLUTION INTRAVENOUS at 05:51

## 2024-04-05 RX ADMIN — MICAFUNGIN SODIUM 100 MG: 100 INJECTION, POWDER, LYOPHILIZED, FOR SOLUTION INTRAVENOUS at 18:54

## 2024-04-05 RX ADMIN — MORPHINE SULFATE 2 MG: 2 INJECTION, SOLUTION INTRAMUSCULAR; INTRAVENOUS at 07:05

## 2024-04-05 RX ADMIN — LEVOTHYROXINE SODIUM 88 MCG: 88 TABLET ORAL at 05:23

## 2024-04-05 RX ADMIN — PIPERACILLIN AND TAZOBACTAM 3.38 G: 3; .375 INJECTION, POWDER, FOR SOLUTION INTRAVENOUS at 22:15

## 2024-04-05 RX ADMIN — MORPHINE SULFATE 2 MG: 2 INJECTION, SOLUTION INTRAMUSCULAR; INTRAVENOUS at 01:07

## 2024-04-05 NOTE — PLAN OF CARE
Goal Outcome Evaluation:Pt painful this shift. Pain managed per MAR. Pt up ad saravanan. Remains on IVF / IV abx.. taking in clear liquids with no issues. Pan of care ongoing

## 2024-04-05 NOTE — PROGRESS NOTES
General Surgery Progress Note    Name: Kirby Gunter ADMIT: 4/3/2024   : 1965  PCP: Chava López MD    MRN: [id number] LOS: 2 days   AGE/SEX: 59 y.o. female  ROOM: [address] [place] [address]    Chief Complaint   Patient presents with    Abdominal Pain     Abd pain, started on Friday, pt is nauseated, pt was seen at urgent care and given medications for constipation that worked but still feeling pain.       Subjective     Some mild improvement this morning.  Tolerating clears and feels hungry.  Still having some liquid bowel movements.    Objective     Scheduled Medications:   amLODIPine, 5 mg, Oral, Daily  levothyroxine, 88 mcg, Oral, Q AM  lisinopril, 20 mg, Oral, Q24H  piperacillin-tazobactam, 3.375 g, Intravenous, Q8H  sennosides-docusate, 1 tablet, Oral, Daily  [START ON 2024] vitamin D, 50,000 Units, Oral, Weekly        Active Infusions:  Pharmacy to Dose Zosyn,   sodium chloride, 100 mL/hr, Last Rate: 100 mL/hr (24)        As Needed Medications:    albuterol    Morphine    ondansetron    Pharmacy to Dose Zosyn    Potassium Replacement - Follow Nurse / BPA Driven Protocol    [COMPLETED] Insert Peripheral IV **AND** sodium chloride    Vital Signs  Vital Signs Patient Vitals for the past 24 hrs:   BP Temp Temp src Pulse Resp SpO2   24 0415 111/65 98.2 °F (36.8 °C) Oral -- 15 --   24 -- 98.3 °F (36.8 °C) Oral -- 15 93 %   24 1636 93/58 98.3 °F (36.8 °C) Oral 98 18 96 %   24 1157 97/62 98.5 °F (36.9 °C) Oral -- 20 --   24 0843 94/58 98.3 °F (36.8 °C) Oral -- 18 95 %     I/O:  I/O last 3 completed shifts:  In: 3521.6 [P.O.:1600; I.V.:1921.6]  Out: 400 [Urine:400]    Physical Exam:  Physical Exam  Cardiovascular:      Rate and Rhythm: Normal rate.   Pulmonary:      Effort: Pulmonary effort is normal.   Abdominal:      General: There is distension.      Palpations: Abdomen is soft.      Comments: Diffuse tenderness however not peritonitic   Neurological:       Mental Status: She is alert.         Results Review:     CBC    Results from last 7 days   Lab Units 04/05/24  0346 04/04/24  0538 04/03/24  1631   WBC 10*3/mm3 31.46* 33.58* 32.90*   HEMOGLOBIN g/dL 9.3* 11.0* 11.6*   PLATELETS 10*3/mm3 317 358 364     BMP   Results from last 7 days   Lab Units 04/05/24  0346 04/04/24  0538 04/03/24  1631   SODIUM mmol/L 133* 134* 133*   POTASSIUM mmol/L 3.7 3.8 3.1*   CHLORIDE mmol/L 96* 93* 90*   CO2 mmol/L 25.0 30.0* 30.0*   BUN mg/dL 25* 19 15   CREATININE mg/dL 0.89 1.05* 1.06*   GLUCOSE mg/dL 83 88 131*       I reviewed the patient's new clinical results.  I reviewed the patient's other test results and agree with the interpretation    Assessment & Plan       Colitis with abscess    Essential hypertension    Hypothyroidism    Hypokalemia    Elevated LFTs    Acute renal insufficiency    Leukocytosis      59 y.o. female with diverticulitis    Tolerating clears, will advance to fulls.  Continue antibiotics.  Given very elevated white count as well as liquid bowel movements will check C. difficile.  Dr. Barron to see over weekend    Kojo Barnett MD  04/05/24  08:39 EDT

## 2024-04-05 NOTE — CASE MANAGEMENT/SOCIAL WORK
Continued Stay Note  ARMAND Joyner     Patient Name: Kirby Gunter  MRN: [id number]  Today's Date: 4/5/2024    Admit Date: 4/3/2024    Plan: Home. Plans to drive self home. Watch for IV antibiotic needs at dc   Discharge Plan       Row Name 04/05/24 1647       Plan    Plan Home. Plans to drive self home. Watch for IV antibiotic needs at dc    Plan Comments DC barriers: IV antibiotics ID consult,  repeat Ct abdomen, stool for cdiff r/o, elevated wbc, monitoring gb               Aundrea Zhou RN

## 2024-04-05 NOTE — PLAN OF CARE
Goal Outcome Evaluation:  Plan of Care Reviewed With: patient        Progress: improving  Outcome Evaluation: Pt up ad saravanan to bathroom. Pt had repeat CT abd/pelvis, ID consulted. Pt NPO, no nausea or vomiting but having pain. Pt continues IVFs and antibiotics. VSS, no concerns at this time.

## 2024-04-05 NOTE — CONSULTS
Infectious Diseases Consult Note    Referring Provider: Chaitanya Lee MD    Reason for Consultation: Diverticulitis, leukocytosis    Patient Care Team:  Chava López MD as PCP - General  Chava López MD as PCP - Family Medicine    Chief complaint abdominal pain, nausea    Subjective     The patient has been afebrile for the last 24 hours.  The patient is on room air, hemodynamically stable, and is tolerating antimicrobial therapy.    History of present illness:      This is a 59-year-old female presents to the hospital on 4/3/2024 with abdominal pain and nausea that been going on for 5 days.  Patient states that she had been to the urgent care several days before was told she was constipated and given sorbitol.  Patient states she is never had diverticulitis or any significant abdominal issues in the past.  Patient states she has had some loose bowel movements but no watery diarrhea.  Patient denies fever, chills, diaphoresis, shortness of breath, cough, GI symptoms, or any urinary symptoms.  Significant fever chills shortness of breath or cough or urine symptoms    Review of Systems   Review of Systems   Constitutional: Negative.    HENT: Negative.     Eyes: Negative.    Respiratory: Negative.     Cardiovascular: Negative.    Gastrointestinal:  Positive for abdominal pain and nausea.   Endocrine: Negative.    Genitourinary: Negative.    Musculoskeletal: Negative.    Skin: Negative.    Neurological: Negative.    Psychiatric/Behavioral: Negative.     All other systems reviewed and are negative.      Medications  Medications Prior to Admission   Medication Sig Dispense Refill Last Dose    amLODIPine (NORVASC) 5 MG tablet TAKE 1 TABLET BY MOUTH DAILY 90 tablet 1 4/3/2024    levothyroxine (SYNTHROID, LEVOTHROID) 88 MCG tablet Take 1 tablet by mouth Daily. 90 tablet 1 4/3/2024    lisinopril-hydrochlorothiazide (PRINZIDE,ZESTORETIC) 20-25 MG per tablet Take 1 tablet by mouth Daily. 90 tablet 1  4/3/2024    sennosides-docusate (senna-docusate sodium) 8.6-50 MG per tablet Take 1 tablet by mouth Daily for 5 days. 5 tablet 0 4/2/2024    albuterol sulfate  (90 Base) MCG/ACT inhaler Inhale 2 puffs Every 4 (Four) Hours As Needed for Wheezing. 8 g 0     vitamin D (ERGOCALCIFEROL) 1.25 MG (96687 UT) capsule capsule Take 1 capsule by mouth 1 (One) Time Per Week. Sundays   3/31/2024       History  Past Medical History:   Diagnosis Date    Acute URI     Hypertension     Hypothyroidism     Overweight      No past surgical history on file.    Family History  Family History   Problem Relation Age of Onset    Hypertension Mother     Aneurysm Mother     Arthritis Mother     Hypertension Sister     Aneurysm Brother        Social History   reports that she quit smoking about 10 months ago. Her smoking use included cigarettes. She started smoking about 42 years ago. She has a 20.7 pack-year smoking history. She has been exposed to tobacco smoke. She has never used smokeless tobacco. She reports current alcohol use. She reports that she does not currently use drugs.    Allergies  Patient has no known allergies.    Objective     Vital Signs   Vital Signs (last 24 hours)         04/04 0700  04/05 0659 04/05 0700  04/05 1655   Most Recent      Temp (°F) 98.2 -  98.5      97.9     97.9 (36.6) 04/05 1207    Heart Rate   98      105     105 04/05 1207    Resp 15 -  20      16     16 04/05 1207    BP 93/58 -  111/65      105/67     105/67 04/05 1207    SpO2 (%) 93 -  96      96     96 04/05 1207    Flow (L/min) 2 -  3      2     2 04/05 0800            Physical Exam:  Physical Exam  Vitals and nursing note reviewed.   Constitutional:       General: She is not in acute distress.     Appearance: She is well-developed and normal weight. She is ill-appearing. She is not diaphoretic.   HENT:      Head: Normocephalic and atraumatic.   Eyes:      General: No scleral icterus.     Extraocular Movements: Extraocular movements intact.       Conjunctiva/sclera: Conjunctivae normal.      Pupils: Pupils are equal, round, and reactive to light.   Cardiovascular:      Rate and Rhythm: Normal rate and regular rhythm.      Heart sounds: Normal heart sounds, S1 normal and S2 normal. No murmur heard.  Pulmonary:      Effort: Pulmonary effort is normal. No respiratory distress.      Breath sounds: Normal breath sounds. No stridor. No wheezing or rales.   Chest:      Chest wall: No tenderness.   Abdominal:      General: Bowel sounds are normal. There is no distension.      Palpations: Abdomen is soft. There is no mass.      Tenderness: There is abdominal tenderness. There is no guarding.   Musculoskeletal:         General: No swelling, tenderness or deformity. Normal range of motion.      Cervical back: Neck supple.   Skin:     General: Skin is warm and dry.      Coloration: Skin is not pale.      Findings: No bruising, erythema or rash.   Neurological:      General: No focal deficit present.      Mental Status: She is alert and oriented to person, place, and time. Mental status is at baseline.      Cranial Nerves: No cranial nerve deficit.   Psychiatric:         Mood and Affect: Mood normal.         Microbiology  Microbiology Results (last 10 days)       Procedure Component Value - Date/Time    Clostridioides difficile Toxin - Stool, Per Rectum [[id number]]  (Normal) Collected: 04/05/24 1208    Lab Status: Final result Specimen: Stool from Per Rectum Updated: 04/05/24 1245    Narrative:      The following orders were created for panel order Clostridioides difficile Toxin - Stool, Per Rectum.  Procedure                               Abnormality         Status                     ---------                               -----------         ------                     Clostridioides difficile...[[id number]]  Normal              Final result                 Please view results for these tests on the individual orders.    Clostridioides difficile EIA - Stool, Per  Rectum [[id number]]  (Normal) Collected: 04/05/24 1208    Lab Status: Final result Specimen: Stool from Per Rectum Updated: 04/05/24 1245     C Diff GDH Ag Negative     C.diff Toxin Ag Negative    Narrative:      The result indicates the absence of toxigenic C.difficile from stool specimen.    MRSA Screen, PCR (Inpatient) - Swab, Nares [[id number]]  (Normal) Collected: 04/04/24 0440    Lab Status: Final result Specimen: Swab from Nares Updated: 04/04/24 0635     MRSA PCR No MRSA Detected    Narrative:      The negative predictive value of this diagnostic test is high and should only be used to consider de-escalating anti-MRSA therapy. A positive result may indicate colonization with MRSA and must be correlated clinically.    Blood Culture - Blood, Arm, Right [[id number]]  (Normal) Collected: 04/03/24 2334    Lab Status: Preliminary result Specimen: Blood from Arm, Right Updated: 04/05/24 0001     Blood Culture No growth at 24 hours    Blood Culture - Blood, Arm, Right [[id number]]  (Normal) Collected: 04/03/24 2334    Lab Status: Preliminary result Specimen: Blood from Arm, Right Updated: 04/05/24 0001     Blood Culture No growth at 24 hours            Laboratory  Results from last 7 days   Lab Units 04/05/24  0346   WBC 10*3/mm3 31.46*   HEMOGLOBIN g/dL 9.3*   HEMATOCRIT % 29.5*   PLATELETS 10*3/mm3 317     Results from last 7 days   Lab Units 04/05/24  0346   SODIUM mmol/L 133*   POTASSIUM mmol/L 3.7   CHLORIDE mmol/L 96*   CO2 mmol/L 25.0   BUN mg/dL 25*   CREATININE mg/dL 0.89   GLUCOSE mg/dL 83   CALCIUM mg/dL 8.7     Results from last 7 days   Lab Units 04/05/24  0346   SODIUM mmol/L 133*   POTASSIUM mmol/L 3.7   CHLORIDE mmol/L 96*   CO2 mmol/L 25.0   BUN mg/dL 25*   CREATININE mg/dL 0.89   GLUCOSE mg/dL 83   CALCIUM mg/dL 8.7                   Radiology  Imaging Results (Last 72 Hours)       Procedure Component Value Units Date/Time    CT Abdomen Pelvis With Contrast [[id number]] Collected: 04/05/24 1110      Updated: 04/05/24 1120    Narrative:      CT ABDOMEN PELVIS W CONTRAST    Date of Exam: 4/5/2024 11:05 AM EDT    Indication: Abdominal pain, acute, nonlocalized.    Comparison: CT of the abdomen and pelvis dated 4/3/2024    Technique: Axial CT images were obtained of the abdomen and pelvis following the uneventful intravenous administration of iodinated contrast. Sagittal and coronal reconstructions were performed.  Automated exposure control and iterative reconstruction   methods were used.    Findings:    Liver: The liver is unremarkable in morphology. No focal liver lesion is seen. No biliary dilation is seen.    Gallbladder: The gallbladder is somewhat distended but appears otherwise unremarkable.    Pancreas: Unremarkable.    Spleen: Unremarkable.    Adrenal glands: Unremarkable.    Genitourinary tract: Kidneys are unremarkable. No hydronephrosis is seen. The visualized portions of the ureters are unremarkable. Urinary bladder is decompressed which limits its evaluation. Pelvic organs demonstrate no acute abnormality.    Gastrointestinal tract: Colonic diverticulosis is present. There is colonic wall thickening involving the proximal/mid sigmoid colon. 2 cm mural abscess is centered on series 3, image 101. Additional 2.8 x 1.3 cm peripherally enhancing fluid collection   on series 3, image 84, compatible with additional small abscess. There is moderate fat stranding within the lower abdomen, and there is mild pelvic free fluid. Remainder of the hollow viscera is unremarkable. No findings to suggest bowel obstruction.    Appendix: No findings to suggest acute appendicitis.    Other findings: No free air is identified. No pathologically enlarged lymph nodes are seen. Vascular calcifications are present. The IVC is unremarkable.    Bones and soft tissues: No acute osseous lesion is identified. Superficial soft tissues demonstrate no acute abnormality. Fat-containing umbilical hernia is present.    Lung bases: The  visualized lung bases are clear.      Impression:      Impression:  Persistent moderate wall thickening within the proximal/mid sigmoid colon may represent diverticulitis or colitis of other etiology. 2 cm mural abscess is centered on series 3, image 101. Additional 2.8 x 1.3 cm peripherally enhancing fluid collection on   series 3, image 84, compatible with additional small abscess. There is moderate fat stranding within the lower abdomen, and there is mild pelvic free fluid. Findings appear overall similar since 4/3/2024. No free air is identified.      Electronically Signed: Eben Hoover MD    4/5/2024 11:18 AM EDT    Workstation ID: [id number]    CT Abdomen Pelvis With Contrast [[id number]] Collected: 04/03/24 2142     Updated: 04/03/24 2148    Narrative:      CT ABDOMEN PELVIS W CONTRAST    Date of Exam: 4/3/2024 9:16 PM EDT    Indication: Abdominal pain, acute, nonlocalized.    Comparison: 4/3/2024    Technique: Axial CT images were obtained of the abdomen and pelvis following the uneventful intravenous administration of iodinated contrast. Sagittal and coronal reconstructions were performed.  Automated exposure control and iterative reconstruction   methods were used.    FINDINGS:    Lung bases: No masses. No consolidation.    Liver:No masses. No intrahepatic biliary ductal dilatation.    Spleen:No masses. No perisplenic hematoma.    Pancreas:No pancreatic masses. No evidence of pancreatitis.    Gallbladder and common bile duct:No evidence of cholelithiasis. No evidence of cholecystitis.    Adrenal glands:No adrenal masses    Kidneys and ureters:No kidney stones. No renal masses.No calculi present within the ureters. Normal caliber ureters.    Urinary bladder:No urinary bladder wall thickening. No bladder masses.    Small bowel:Normal caliber small bowel.    Large bowel: Severe colonic inflammation involving the sigmoid colon of the sigmoid colon with secondary severe inflammation of the adjacent loops of  small bowel. No fistulization is present. Small abscess partially within the anterior superior colonic   wall noted measuring 1.9 cm x 1.4 cm. Significant surrounding inflammation of the fat.    Appendix: Normal    GENITOURINARY: Normal appearance of the uterus and adnexa.    Ascites or pneumoperitoneum: Trace free fluid.    Adenopathy:None present    Osseous structures: Degenerative changes of the lumbar spine and the knee hips.    Other findings: None      Impression:      Severe inflammation of the colon with intramural abscess. Findings may indicate diverticulitis or focal colitis. Limited characterization due to intense inflammation. There is secondary severe inflammation of an adjacent small bowel without   fistulization appreciated.            Electronically Signed: Alexi Kirk MD    4/3/2024 9:46 PM EDT    Workstation ID: [id number]    CT Abdomen Pelvis Without Contrast [[id number]] Collected: 04/03/24 1730     Updated: 04/03/24 1749    Narrative:      CT ABDOMEN PELVIS WO CONTRAST    Date of Exam: 4/3/2024 5:25 PM EDT    Indication: Abdominal pain, acute, nonlocalized.    Comparison: None available.    Technique: Axial CT images were obtained of the abdomen and pelvis without the administration of contrast. Sagittal and coronal reconstructions were performed.  Automated exposure control and iterative reconstruction methods were used.      Findings:  Lung Bases:    The visualized lung bases and lower mediastinal structures are unremarkable.      Liver:Liver is normal in size and CT density. No focal lesions.      Biliary/Gallbladder: The gallbladder is without evidence of radiopaque stones. The biliary tree is nondilated.      Spleen:Spleen is normal in size and CT density.    Pancreas:   Pancreas shows homogeneous density. There is no evidence of pancreatic mass or peripancreatic fluid.      Kidneys: Kidneys are normal in size. There are no stones or hydronephrosis.      Adrenals: Adrenal glands are  unremarkable.      Retroperitoneal/Lymph Nodes/Vasculature: No retroperitoneal adenopathy is identified by size criteria.      Gastrointestinal/Mesentery: The stomach is unremarkable. There are few mildly distended loops of small bowel with air-fluid levels. The appendix is within normal limits. There is a long segment of severe diffuse thickening of the sigmoid colon wall with   extensive pericolonic fat stranding. There is also a focally adjacent loop of small bowel in the right mid pelvis with diffuse wall thickening and surrounding inflammatory changes. There is a focal area of loss of the fat planes between the sigmoid colon   and adjacent small bowel loop (image 88 series 2). The lack of contrast limits the characterization. Findings are compatible with sigmoiditis and ileitis. A fistulous tract cannot be excluded. No definite focal fluid collections are seen or free air.   There is no significant free fluid.    There is an omental fat umbilical hernia    Bladder: Bladder is contracted. There is diffuse thickening of the bladder wall. No intraluminal stones    Bony Structures:  Visualized bony structures are consistent with the patient's age.        Impression:      Impression:    1. Long segment of severe diffuse thickening of the sigmoid colon wall with extensive pericolonic fat stranding compatible with sigmoiditis. Adjacent to the inflamed sigmoid colon towards the right there is diffuse thickening of few small bowel loops   with surrounding fat stranding compatible with an ileitis or reactive inflammatory response. There is a focal area of loss of the fat planes between the sigmoid colon and adjacent small bowel loop at this level. A fistulous tract is not evident but   cannot be excluded. Further evaluation with a CT abdomen pelvis with intravenous and oral contrast recommended    2. No definite focal fluid collections or free air identified            Electronically Signed: Air Bettencourt MD     4/3/2024 5:47 PM EDT    Workstation ID: [id number]            Cardiology      Results Review:  I have reviewed all clinical data, test, lab, and imaging results.       Schedule Meds  amLODIPine, 5 mg, Oral, Daily  levothyroxine, 88 mcg, Oral, Q AM  lisinopril, 20 mg, Oral, Q24H  micafungin (MYCAMINE) IV, 100 mg, Intravenous, Q24H  piperacillin-tazobactam, 3.375 g, Intravenous, Q8H  sennosides-docusate, 1 tablet, Oral, Daily  [START ON 4/7/2024] vitamin D, 50,000 Units, Oral, Weekly        Infusion Meds  Pharmacy to Dose Zosyn,   sodium chloride, 100 mL/hr, Last Rate: 100 mL/hr (04/04/24 1954)        PRN Meds    albuterol    Morphine    ondansetron    Pharmacy to Dose Zosyn    Potassium Replacement - Follow Nurse / BPA Driven Protocol    [COMPLETED] Insert Peripheral IV **AND** sodium chloride      Assessment & Plan       Assessment    Acute diverticulitis.  This is the patient's first episode.  Repeat CT completed today now shows several small mural abscesses-likely too small to be drained.  Blood cultures are negative so far    Severe leukocytosis-likely related to the above.  C. difficile screen was negative    Plan    Continue IV Zosyn 3.375 g every 8 hours  Start IV micafungin 100 mg every 24 hours  Patient will need at least 2 weeks of IV antimicrobial therapy  General surgery also following-May need sigmoidectomy and end colostomy if she does not clinically improve this admission  Continue supportive care  Han labs  Case discussed with patient and RN at bedside    JAIRO Jenkins  04/05/24  16:55 EDT    Note is dictated utilizing voice recognition software/Dragon

## 2024-04-05 NOTE — PROGRESS NOTES
[facility] MEDICINE SERVICE  DAILY PROGRESS NOTE    NAME: Kirby Gunter  : 1965  MRN: [id number]      LOS: 2 days     PROVIDER OF SERVICE: JAIRO Bermeo    Chief Complaint: Colitis with abscess    Subjective:     Interval History: Patient complaining of some abdominal pain this morning, reports abdominal distention has persisted.  She is tolerating clear liquid diet. Reports diarrhea has persisted, states it is strictly liquid at this time.     Review of Systems:   Review of Systems   Respiratory:  Negative for shortness of breath.    Cardiovascular:  Negative for chest pain.   Gastrointestinal:  Positive for abdominal distention, abdominal pain and diarrhea. Negative for nausea and vomiting.   Neurological:  Negative for dizziness and headaches.       Objective:     Vital Signs  Temp:  [98.2 °F (36.8 °C)-98.5 °F (36.9 °C)] 98.2 °F (36.8 °C)  Heart Rate:  [98] 98  Resp:  [15-20] 15  BP: ()/(58-65) 111/65  Flow (L/min):  [2-3] 2   Body mass index is 26.85 kg/m².    Physical Exam  Physical Exam  Constitutional:       Appearance: Normal appearance.   HENT:      Head: Normocephalic and atraumatic.      Nose: Nose normal.      Mouth/Throat:      Mouth: Mucous membranes are moist.      Pharynx: Oropharynx is clear.   Eyes:      Extraocular Movements: Extraocular movements intact.      Conjunctiva/sclera: Conjunctivae normal.      Pupils: Pupils are equal, round, and reactive to light.   Cardiovascular:      Rate and Rhythm: Normal rate and regular rhythm.      Pulses: Normal pulses.   Pulmonary:      Effort: Pulmonary effort is normal.   Abdominal:      General: There is distension.      Tenderness: There is abdominal tenderness. There is guarding.   Musculoskeletal:         General: Normal range of motion.      Cervical back: Normal range of motion and neck supple.   Skin:     General: Skin is warm and dry.   Neurological:      General: No focal deficit present.      Mental Status: She is  alert and oriented to person, place, and time. Mental status is at baseline.   Psychiatric:         Mood and Affect: Mood normal.         Behavior: Behavior normal.         Thought Content: Thought content normal.         Judgment: Judgment normal.         Scheduled Meds   amLODIPine, 5 mg, Oral, Daily  levothyroxine, 88 mcg, Oral, Q AM  lisinopril, 20 mg, Oral, Q24H  piperacillin-tazobactam, 3.375 g, Intravenous, Q8H  sennosides-docusate, 1 tablet, Oral, Daily  [START ON 4/7/2024] vitamin D, 50,000 Units, Oral, Weekly       PRN Meds     albuterol    Morphine    ondansetron    Pharmacy to Dose Zosyn    Potassium Replacement - Follow Nurse / BPA Driven Protocol    [COMPLETED] Insert Peripheral IV **AND** sodium chloride   Infusions  Pharmacy to Dose Zosyn,   sodium chloride, 100 mL/hr, Last Rate: 100 mL/hr (04/04/24 1954)          Diagnostic Data    Results from last 7 days   Lab Units 04/05/24  0346 04/04/24  0538 04/03/24  1631   WBC 10*3/mm3 31.46*   < > 32.90*   HEMOGLOBIN g/dL 9.3*   < > 11.6*   HEMATOCRIT % 29.5*   < > 35.6   PLATELETS 10*3/mm3 317   < > 364   GLUCOSE mg/dL 83   < > 131*   CREATININE mg/dL 0.89   < > 1.06*   BUN mg/dL 25*   < > 15   SODIUM mmol/L 133*   < > 133*   POTASSIUM mmol/L 3.7   < > 3.1*   AST (SGOT) U/L  --   --  53*   ALT (SGPT) U/L  --   --  80*   ALK PHOS U/L  --   --  135*   BILIRUBIN mg/dL  --   --  1.2   ANION GAP mmol/L 12.0   < > 13.0    < > = values in this interval not displayed.       CT Abdomen Pelvis With Contrast    Result Date: 4/3/2024  Severe inflammation of the colon with intramural abscess. Findings may indicate diverticulitis or focal colitis. Limited characterization due to intense inflammation. There is secondary severe inflammation of an adjacent small bowel without fistulization appreciated. Electronically Signed: Alexi Kirk MD  4/3/2024 9:46 PM EDT  Workstation ID: [id number]    CT Abdomen Pelvis Without Contrast    Result Date: 4/3/2024  Impression: 1. Long  "segment of severe diffuse thickening of the sigmoid colon wall with extensive pericolonic fat stranding compatible with sigmoiditis. Adjacent to the inflamed sigmoid colon towards the right there is diffuse thickening of few small bowel loops with surrounding fat stranding compatible with an ileitis or reactive inflammatory response. There is a focal area of loss of the fat planes between the sigmoid colon and adjacent small bowel loop at this level. A fistulous tract is not evident but cannot be excluded. Further evaluation with a CT abdomen pelvis with intravenous and oral contrast recommended 2. No definite focal fluid collections or free air identified Electronically Signed: Ari Bettencourt MD  4/3/2024 5:47 PM EDT  Workstation ID: [id number]       I reviewed the patient's new clinical results.    Assessment/Plan:     Active and Resolved Problems  Active Hospital Problems    Diagnosis  POA    **Colitis with abscess [K52.9, K63.0]  Yes    Hypokalemia [E87.6]  Yes    Elevated LFTs [R79.89]  Yes    Acute renal insufficiency [N28.9]  Yes    Leukocytosis [D72.829]  Yes    Hypothyroidism [E03.9]  Yes    Essential hypertension [I10]  Yes      Resolved Hospital Problems   No resolved problems to display.       Acute colitis with intramural abscess with sepsis (POA)  -Patient has underlying infectious process with colitis and small intramural abscess with significant leukocytosis and tachycardia, all consistent with sepsis  -Ct done 4/3 showing \"Severe inflammation of the colon with intramural abscess. Findings may indicate diverticulitis or focal colitis. Limited characterization due to intense inflammation. There is secondary severe inflammation of an adjacent small bowel without fistulization appreciated.\"  -Patient WBC 31.46 today, only slightly improved from yesterdays 33.58  -Blood cultures negative to date  -Continue IV antibiotics with Zosyn  -ID consult placed  -She is tolerating clear liquid diet today, noted " general surgery plans to advance to full liquid diet  -Discussed with patient regarding possible need for surgical intervention with partial colon resection and colostomy; if patient's symptoms get worse, she would likely require surgical intervention  -Appreciate surgery recommendations  -Due to persistent diarrhea, c. Diff stool to be checked   -Will repeat CT abdomen/pelvis with contrast today    Hypertension  -Continue medications with lisinopril, amlodipine but holding HCTZ  -Stable    Hypothyroidism  -Continue Synthroid    Mild HERSON  -Renal function appears stable  -Avoid nephrotoxic medications if able    Acute hypokalemia  -Repleted        DVT prophylaxis:  Mechanical DVT prophylaxis orders are present.         Code status is   Code Status and Medical Interventions:   Ordered at: 04/03/24 2211     Code Status (Patient has no pulse and is not breathing):    CPR (Attempt to Resuscitate)     Medical Interventions (Patient has pulse or is breathing):    Full Support       Plan for disposition: Pending clinical course.  Patient may require surgical invention on this admission.    Time: 30 minutes    Signature: Electronically signed by JAIRO Bermeo, 04/05/24, 08:57 EDT.  [facility] Hospitalist Team

## 2024-04-06 ENCOUNTER — APPOINTMENT (OUTPATIENT)
Dept: GENERAL RADIOLOGY | Facility: HOSPITAL | Age: 59
End: 2024-04-06
Payer: MEDICAID

## 2024-04-06 LAB
ANION GAP SERPL CALCULATED.3IONS-SCNC: 13 MMOL/L (ref 5–15)
BASOPHILS # BLD AUTO: 0.04 10*3/MM3 (ref 0–0.2)
BASOPHILS NFR BLD AUTO: 0.2 % (ref 0–1.5)
BUN SERPL-MCNC: 15 MG/DL (ref 8–23)
BUN/CREAT SERPL: 28.8 (ref 7–25)
CALCIUM SPEC-SCNC: 8 MG/DL (ref 8.6–10.5)
CHLORIDE SERPL-SCNC: 102 MMOL/L (ref 98–107)
CO2 SERPL-SCNC: 23 MMOL/L (ref 22–29)
CREAT SERPL-MCNC: 0.52 MG/DL (ref 0.57–1)
DEPRECATED RDW RBC AUTO: 44.8 FL (ref 37–54)
EGFRCR SERPLBLD CKD-EPI 2021: 105.9 ML/MIN/1.73
EOSINOPHIL # BLD AUTO: 0.09 10*3/MM3 (ref 0–0.4)
EOSINOPHIL NFR BLD AUTO: 0.4 % (ref 0.3–6.2)
ERYTHROCYTE [DISTWIDTH] IN BLOOD BY AUTOMATED COUNT: 13 % (ref 12.3–15.4)
GLUCOSE SERPL-MCNC: 72 MG/DL (ref 65–99)
HCT VFR BLD AUTO: 28 % (ref 34–46.6)
HGB BLD-MCNC: 8.7 G/DL (ref 12–15.9)
IMM GRANULOCYTES # BLD AUTO: 0.31 10*3/MM3 (ref 0–0.05)
IMM GRANULOCYTES NFR BLD AUTO: 1.5 % (ref 0–0.5)
LYMPHOCYTES # BLD AUTO: 1.72 10*3/MM3 (ref 0.7–3.1)
LYMPHOCYTES NFR BLD AUTO: 8.1 % (ref 19.6–45.3)
MCH RBC QN AUTO: 29.5 PG (ref 26.6–33)
MCHC RBC AUTO-ENTMCNC: 31.1 G/DL (ref 31.5–35.7)
MCV RBC AUTO: 94.9 FL (ref 79–97)
MONOCYTES # BLD AUTO: 1.6 10*3/MM3 (ref 0.1–0.9)
MONOCYTES NFR BLD AUTO: 7.5 % (ref 5–12)
NEUTROPHILS NFR BLD AUTO: 17.49 10*3/MM3 (ref 1.7–7)
NEUTROPHILS NFR BLD AUTO: 82.3 % (ref 42.7–76)
NRBC BLD AUTO-RTO: 0 /100 WBC (ref 0–0.2)
PLATELET # BLD AUTO: 344 10*3/MM3 (ref 140–450)
PMV BLD AUTO: 10.1 FL (ref 6–12)
POTASSIUM SERPL-SCNC: 3.2 MMOL/L (ref 3.5–5.2)
POTASSIUM SERPL-SCNC: 4 MMOL/L (ref 3.5–5.2)
RBC # BLD AUTO: 2.95 10*6/MM3 (ref 3.77–5.28)
SODIUM SERPL-SCNC: 138 MMOL/L (ref 136–145)
WBC NRBC COR # BLD AUTO: 21.25 10*3/MM3 (ref 3.4–10.8)

## 2024-04-06 PROCEDURE — 71045 X-RAY EXAM CHEST 1 VIEW: CPT

## 2024-04-06 PROCEDURE — 25010000002 PIPERACILLIN SOD-TAZOBACTAM PER 1 G: Performed by: NURSE PRACTITIONER

## 2024-04-06 PROCEDURE — 25010000002 MICAFUNGIN SODIUM 100 MG RECONSTITUTED SOLUTION 1 EACH VIAL: Performed by: INTERNAL MEDICINE

## 2024-04-06 PROCEDURE — 84132 ASSAY OF SERUM POTASSIUM: CPT | Performed by: FAMILY MEDICINE

## 2024-04-06 PROCEDURE — 80048 BASIC METABOLIC PNL TOTAL CA: CPT | Performed by: NURSE PRACTITIONER

## 2024-04-06 PROCEDURE — 25010000002 MORPHINE PER 10 MG: Performed by: NURSE PRACTITIONER

## 2024-04-06 PROCEDURE — 25810000003 SODIUM CHLORIDE 0.9 % SOLUTION: Performed by: NURSE PRACTITIONER

## 2024-04-06 PROCEDURE — 99232 SBSQ HOSP IP/OBS MODERATE 35: CPT

## 2024-04-06 PROCEDURE — 85025 COMPLETE CBC W/AUTO DIFF WBC: CPT | Performed by: NURSE PRACTITIONER

## 2024-04-06 RX ORDER — POTASSIUM CHLORIDE 20 MEQ/1
40 TABLET, EXTENDED RELEASE ORAL EVERY 4 HOURS
Status: DISCONTINUED | OUTPATIENT
Start: 2024-04-06 | End: 2024-04-06

## 2024-04-06 RX ORDER — POTASSIUM CHLORIDE 20 MEQ/1
40 TABLET, EXTENDED RELEASE ORAL EVERY 4 HOURS
Status: COMPLETED | OUTPATIENT
Start: 2024-04-06 | End: 2024-04-06

## 2024-04-06 RX ORDER — AMLODIPINE BESYLATE 5 MG/1
10 TABLET ORAL DAILY
Status: DISCONTINUED | OUTPATIENT
Start: 2024-04-07 | End: 2024-04-09 | Stop reason: HOSPADM

## 2024-04-06 RX ADMIN — LEVOTHYROXINE SODIUM 88 MCG: 88 TABLET ORAL at 06:33

## 2024-04-06 RX ADMIN — MORPHINE SULFATE 2 MG: 2 INJECTION, SOLUTION INTRAMUSCULAR; INTRAVENOUS at 14:07

## 2024-04-06 RX ADMIN — MORPHINE SULFATE 2 MG: 2 INJECTION, SOLUTION INTRAMUSCULAR; INTRAVENOUS at 20:04

## 2024-04-06 RX ADMIN — PIPERACILLIN AND TAZOBACTAM 3.38 G: 3; .375 INJECTION, POWDER, FOR SOLUTION INTRAVENOUS at 06:33

## 2024-04-06 RX ADMIN — POTASSIUM CHLORIDE 40 MEQ: 1500 TABLET, EXTENDED RELEASE ORAL at 06:33

## 2024-04-06 RX ADMIN — Medication 10 ML: at 09:06

## 2024-04-06 RX ADMIN — LISINOPRIL 20 MG: 20 TABLET ORAL at 09:06

## 2024-04-06 RX ADMIN — MORPHINE SULFATE 2 MG: 2 INJECTION, SOLUTION INTRAMUSCULAR; INTRAVENOUS at 05:04

## 2024-04-06 RX ADMIN — MORPHINE SULFATE 2 MG: 2 INJECTION, SOLUTION INTRAMUSCULAR; INTRAVENOUS at 09:06

## 2024-04-06 RX ADMIN — POTASSIUM CHLORIDE 40 MEQ: 1500 TABLET, EXTENDED RELEASE ORAL at 09:06

## 2024-04-06 RX ADMIN — MICAFUNGIN SODIUM 100 MG: 100 INJECTION, POWDER, LYOPHILIZED, FOR SOLUTION INTRAVENOUS at 20:04

## 2024-04-06 RX ADMIN — SODIUM CHLORIDE 100 ML/HR: 9 INJECTION, SOLUTION INTRAVENOUS at 23:33

## 2024-04-06 RX ADMIN — AMLODIPINE BESYLATE 5 MG: 5 TABLET ORAL at 09:06

## 2024-04-06 RX ADMIN — PIPERACILLIN AND TAZOBACTAM 3.38 G: 3; .375 INJECTION, POWDER, FOR SOLUTION INTRAVENOUS at 23:33

## 2024-04-06 RX ADMIN — DOCUSATE SODIUM AND SENNOSIDES 1 TABLET: 8.6; 5 TABLET, FILM COATED ORAL at 09:06

## 2024-04-06 RX ADMIN — MORPHINE SULFATE 2 MG: 2 INJECTION, SOLUTION INTRAMUSCULAR; INTRAVENOUS at 00:03

## 2024-04-06 RX ADMIN — PIPERACILLIN AND TAZOBACTAM 3.38 G: 3; .375 INJECTION, POWDER, FOR SOLUTION INTRAVENOUS at 14:07

## 2024-04-06 NOTE — PLAN OF CARE
Goal Outcome Evaluation: Upon change in shift, patient ambulated to the bathroom. Complaints of pain this shift, see MAR for interventions. Tolerating antibiotics well, able to makes needs know and care on going.

## 2024-04-06 NOTE — PROGRESS NOTES
Infectious Diseases Progress Note      LOS: 3 days   Patient Care Team:  Chava López MD as PCP - General  Chava López MD as PCP - Family Medicine    Chief Complaint: Abdominal pain    Subjective       Patient had no fever during the last 24 hours.  She continued to have abdominal pain but symptoms had improved significantly.  She is hemodynamically stable  Review of Systems:   Review of Systems   Constitutional: Negative.    HENT: Negative.     Eyes: Negative.    Respiratory: Negative.     Cardiovascular: Negative.    Gastrointestinal:  Positive for abdominal pain.   Genitourinary: Negative.    Musculoskeletal: Negative.    Skin: Negative.    Neurological: Negative.    Hematological: Negative.    Psychiatric/Behavioral: Negative.          Objective     Vital Signs  Temp:  [97.7 °F (36.5 °C)-98 °F (36.7 °C)] 97.9 °F (36.6 °C)  Heart Rate:  [] 107  Resp:  [17-20] 20  BP: (110-160)/(65-83) 160/83    Physical Exam:  Physical Exam  Vitals and nursing note reviewed.   Constitutional:       Appearance: She is well-developed.   HENT:      Head: Normocephalic and atraumatic.   Eyes:      Pupils: Pupils are equal, round, and reactive to light.   Cardiovascular:      Rate and Rhythm: Normal rate and regular rhythm.      Heart sounds: Normal heart sounds.   Pulmonary:      Effort: Pulmonary effort is normal. No respiratory distress.      Breath sounds: Normal breath sounds. No wheezing or rales.   Abdominal:      General: Bowel sounds are normal. There is no distension.      Palpations: Abdomen is soft. There is no mass.      Tenderness: There is abdominal tenderness. There is no guarding or rebound.   Musculoskeletal:         General: No deformity. Normal range of motion.      Cervical back: Normal range of motion and neck supple.   Skin:     General: Skin is warm.      Findings: No erythema or rash.   Neurological:      Mental Status: She is alert and oriented to person, place, and time.       Cranial Nerves: No cranial nerve deficit.          Results Review:    I have reviewed all clinical data, test, lab, and imaging results.     Radiology  XR Chest 1 View    Result Date: 4/6/2024  XR CHEST 1 VW Date of Exam: 4/6/2024 5:43 AM EDT Indication: Shortness of breath Comparison: Chest x-ray dated May 28, 2023 Findings: There are no airspace consolidations. No pleural fluid. No pneumothorax. The pulmonary vasculature appears within normal limits. The cardiac and mediastinal silhouette appear unremarkable. No acute osseous abnormality identified.     Impression: No active disease Electronically Signed: Patel Sunshine MD  4/6/2024 6:03 AM EDT  Workstation ID: [id number]     Cardiology    Laboratory    Results from last 7 days   Lab Units 04/06/24  0151 04/05/24  0346 04/04/24  0538 04/03/24  1631   WBC 10*3/mm3 21.25* 31.46* 33.58* 32.90*   HEMOGLOBIN g/dL 8.7* 9.3* 11.0* 11.6*   HEMATOCRIT % 28.0* 29.5* 34.2 35.6   PLATELETS 10*3/mm3 344 317 358 364     Results from last 7 days   Lab Units 04/06/24  1328 04/06/24  0151 04/05/24  0346 04/04/24  0538 04/03/24  1631   SODIUM mmol/L  --  138 133* 134* 133*   POTASSIUM mmol/L 4.0 3.2* 3.7 3.8 3.1*   CHLORIDE mmol/L  --  102 96* 93* 90*   CO2 mmol/L  --  23.0 25.0 30.0* 30.0*   BUN mg/dL  --  15 25* 19 15   CREATININE mg/dL  --  0.52* 0.89 1.05* 1.06*   GLUCOSE mg/dL  --  72 83 88 131*   ALBUMIN g/dL  --   --   --   --  4.0   BILIRUBIN mg/dL  --   --   --   --  1.2   ALK PHOS U/L  --   --   --   --  135*   AST (SGOT) U/L  --   --   --   --  53*   ALT (SGPT) U/L  --   --   --   --  80*   CALCIUM mg/dL  --  8.0* 8.7 9.1 9.2                 Microbiology   Microbiology Results (last 10 days)       Procedure Component Value - Date/Time    Clostridioides difficile Toxin - Stool, Per Rectum [[id number]]  (Normal) Collected: 04/05/24 1208    Lab Status: Final result Specimen: Stool from Per Rectum Updated: 04/05/24 1245    Narrative:      The following orders were created for  panel order Clostridioides difficile Toxin - Stool, Per Rectum.  Procedure                               Abnormality         Status                     ---------                               -----------         ------                     Clostridioides difficile...[[id number]]  Normal              Final result                 Please view results for these tests on the individual orders.    Clostridioides difficile EIA - Stool, Per Rectum [[id number]]  (Normal) Collected: 04/05/24 1208    Lab Status: Final result Specimen: Stool from Per Rectum Updated: 04/05/24 1245     C Diff GDH Ag Negative     C.diff Toxin Ag Negative    Narrative:      The result indicates the absence of toxigenic C.difficile from stool specimen.    MRSA Screen, PCR (Inpatient) - Swab, Nares [[id number]]  (Normal) Collected: 04/04/24 0440    Lab Status: Final result Specimen: Swab from Nares Updated: 04/04/24 0635     MRSA PCR No MRSA Detected    Narrative:      The negative predictive value of this diagnostic test is high and should only be used to consider de-escalating anti-MRSA therapy. A positive result may indicate colonization with MRSA and must be correlated clinically.    Blood Culture - Blood, Arm, Right [[id number]]  (Normal) Collected: 04/03/24 2334    Lab Status: Preliminary result Specimen: Blood from Arm, Right Updated: 04/06/24 0000     Blood Culture No growth at 2 days    Blood Culture - Blood, Arm, Right [[id number]]  (Normal) Collected: 04/03/24 2334    Lab Status: Preliminary result Specimen: Blood from Arm, Right Updated: 04/06/24 0000     Blood Culture No growth at 2 days            Medication Review:       Schedule Meds  [START ON 4/7/2024] amLODIPine, 10 mg, Oral, Daily  levothyroxine, 88 mcg, Oral, Q AM  lisinopril, 20 mg, Oral, Q24H  micafungin (MYCAMINE) IV, 100 mg, Intravenous, Q24H  piperacillin-tazobactam, 3.375 g, Intravenous, Q8H  [START ON 4/7/2024] vitamin D, 50,000 Units, Oral, Weekly        Infusion Meds  Pharmacy  to Dose Zosyn,   sodium chloride, 100 mL/hr, Last Rate: 100 mL/hr (04/05/24 1912)        PRN Meds    albuterol    Morphine    ondansetron    Pharmacy to Dose Zosyn    Potassium Replacement - Follow Nurse / BPA Driven Protocol    [COMPLETED] Insert Peripheral IV **AND** sodium chloride        Assessment & Plan       Antimicrobial Therapy   1.  IV Zosyn        2.  IV micafungin        3.        4.        5.            Assessment     Acute diverticulitis.  This is the patient's first episode.  Abdomen CT scan shows several small mural abscesses-likely too small to be drained.  Blood cultures are negative so far     Severe leukocytosis.  Secondary to above.  White count started to improve.  Stool was negative for C. difficile toxin and antigen     Plan     Continue IV Zosyn 3.375 g every 8 hours  Continue IV micafungin 100 mg every 24 hours  Patient will need at least 2 weeks of IV antimicrobial therapy  General surgery also following-May need sigmoidectomy and end colostomy if she does not clinically improve this admission  Continue supportive care  A.m. labs  May need to repeat CT scan of abdomen and pelvis in 2 days      Geoffrey Ruiz MD  04/06/24  15:25 EDT    Note is dictated utilizing voice recognition software/Dragon

## 2024-04-06 NOTE — PROGRESS NOTES
General Surgery Progress Note    Name: Kirby Gunter ADMIT: 4/3/2024   : 1965  PCP: Chava López MD    MRN: [id number] LOS: 3 days   AGE/SEX: 59 y.o. female  ROOM: [address] [place] [address]    Chief Complaint   Patient presents with    Abdominal Pain     Abd pain, started on Friday, pt is nauseated, pt was seen at urgent care and given medications for constipation that worked but still feeling pain.       Subjective     Patient seen and examined.  Hypertensive this morning, heart rate 112 at 5 AM this morning.  Patient has remained afebrile.    Reports improvement in abdominal pain, states she is feeling a little better.  Requesting morphine regularly for pain control. Has been tolerating sips and chips, denies nausea or vomiting.  Passing flatus and having bowel movements.  Leukocytosis improving, 21.25 (31.46) this morning.    Objective     Scheduled Medications:   amLODIPine, 5 mg, Oral, Daily  levothyroxine, 88 mcg, Oral, Q AM  lisinopril, 20 mg, Oral, Q24H  micafungin (MYCAMINE) IV, 100 mg, Intravenous, Q24H  piperacillin-tazobactam, 3.375 g, Intravenous, Q8H  [START ON 2024] vitamin D, 50,000 Units, Oral, Weekly        Active Infusions:  Pharmacy to Dose Zosyn,   sodium chloride, 100 mL/hr, Last Rate: 100 mL/hr (24 191)        As Needed Medications:    albuterol    Morphine    ondansetron    Pharmacy to Dose Zosyn    Potassium Replacement - Follow Nurse / BPA Driven Protocol    [COMPLETED] Insert Peripheral IV **AND** sodium chloride    Vital Signs  Vital Signs Patient Vitals for the past 24 hrs:   BP Temp Temp src Pulse Resp SpO2   24 0440 160/83 98 °F (36.7 °C) Oral 112 18 94 %   24 2056 110/65 97.7 °F (36.5 °C) Oral 96 17 98 %   24 1207 105/67 97.9 °F (36.6 °C) Oral 105 16 96 %     I/O:  I/O last 3 completed shifts:  In: 1763.7 [P.O.:520; I.V.:1243.7]  Out: 2200 [Urine:2200]    Physical Exam:  Physical Exam  Constitutional:       General: She is not in acute  distress.  Cardiovascular:      Rate and Rhythm: Tachycardia present.   Pulmonary:      Effort: Pulmonary effort is normal. No respiratory distress.   Abdominal:      General: There is distension.      Palpations: Abdomen is soft.      Tenderness: There is abdominal tenderness. There is no guarding.      Comments: Umbilical/ventral hernia noted.   Neurological:      Mental Status: She is alert and oriented to person, place, and time.   Psychiatric:         Mood and Affect: Mood normal.         Behavior: Behavior normal.         Results Review:     CBC    Results from last 7 days   Lab Units 04/06/24  0151 04/05/24  0346 04/04/24  0538 04/03/24  1631   WBC 10*3/mm3 21.25* 31.46* 33.58* 32.90*   HEMOGLOBIN g/dL 8.7* 9.3* 11.0* 11.6*   PLATELETS 10*3/mm3 344 317 358 364     BMP   Results from last 7 days   Lab Units 04/06/24  0151 04/05/24  0346 04/04/24 0538 04/03/24  1631   SODIUM mmol/L 138 133* 134* 133*   POTASSIUM mmol/L 3.2* 3.7 3.8 3.1*   CHLORIDE mmol/L 102 96* 93* 90*   CO2 mmol/L 23.0 25.0 30.0* 30.0*   BUN mg/dL 15 25* 19 15   CREATININE mg/dL 0.52* 0.89 1.05* 1.06*   GLUCOSE mg/dL 72 83 88 131*     Radiology(recent) XR Chest 1 View    Result Date: 4/6/2024  Impression: No active disease Electronically Signed: Patel Sunshine MD  4/6/2024 6:03 AM EDT  Workstation ID: [id number]    CT Abdomen Pelvis With Contrast    Result Date: 4/5/2024  Impression: Persistent moderate wall thickening within the proximal/mid sigmoid colon may represent diverticulitis or colitis of other etiology. 2 cm mural abscess is centered on series 3, image 101. Additional 2.8 x 1.3 cm peripherally enhancing fluid collection on  series 3, image 84, compatible with additional small abscess. There is moderate fat stranding within the lower abdomen, and there is mild pelvic free fluid. Findings appear overall similar since 4/3/2024. No free air is identified. Electronically Signed: Eben Hoover MD  4/5/2024 11:18 AM EDT  Workstation ID:  [id number]     I reviewed the patient's new clinical results.    Assessment & Plan       Colitis with abscess    Essential hypertension    Hypothyroidism    Hypokalemia    Elevated LFTs    Acute renal insufficiency    Leukocytosis      59 y.o. female admitted 4/3/2024 with severe diverticulitis    NPO, continue IV fluids  Okay for sips of clear liquids and ice chips for comfort  Pain medication as needed  Continue IV antibiotic per ID, will appreciate recommendations  Trend WBC count, 21.25 (31.46) this morning  Will continue to monitor closely.  May require a sigmoidectomy and end colostomy if she does not clinically improve this admission        This note was created using Dragon Voice Recognition software.    HOWARD Castro  04/06/24  09:42 EDT

## 2024-04-06 NOTE — PROGRESS NOTES
[facility] MEDICINE SERVICE  DAILY PROGRESS NOTE    NAME: Kirby Gunter  : 1965  MRN: [id number]      LOS: 3 days     PROVIDER OF SERVICE: JAIRO Eaton    Chief Complaint: Colitis with abscess    Subjective:     Interval History:  History taken from: patient  Patient Complaints: Hungry   Patient Denies:  Shortness of breath, chest pain, headache, dizziness     Review of Systems:   Review of Systems   Constitutional:  Negative for chills and fever.   Respiratory:  Negative for chest tightness and shortness of breath.    Cardiovascular:  Negative for chest pain.   Gastrointestinal:  Positive for abdominal pain.   Neurological:  Negative for dizziness and headaches.       Objective:     Vital Signs  Temp:  [97.7 °F (36.5 °C)-98 °F (36.7 °C)] 98 °F (36.7 °C)  Heart Rate:  [] 112  Resp:  [17-18] 18  BP: (110-160)/(65-83) 160/83   Body mass index is 26.85 kg/m².    Physical Exam  Physical Exam  Constitutional:       Appearance: Normal appearance.   HENT:      Head: Normocephalic and atraumatic.      Nose: Nose normal.      Mouth/Throat:      Mouth: Mucous membranes are moist.   Eyes:      Extraocular Movements: Extraocular movements intact.      Conjunctiva/sclera: Conjunctivae normal.      Pupils: Pupils are equal, round, and reactive to light.   Cardiovascular:      Rate and Rhythm: Regular rhythm. Tachycardia present.      Pulses: Normal pulses.      Heart sounds: Normal heart sounds.   Pulmonary:      Effort: Pulmonary effort is normal.      Breath sounds: Normal breath sounds.   Abdominal:      General: Abdomen is flat. Bowel sounds are normal.      Palpations: Abdomen is soft.      Tenderness: There is abdominal tenderness.   Musculoskeletal:         General: Normal range of motion.      Cervical back: Normal range of motion.   Skin:     General: Skin is warm and dry.   Neurological:      General: No focal deficit present.      Mental Status: She is alert and oriented to person,  "Pt called his daughter tonight to bring him to the hospital. He is disorganized and rambling. Pt's daughter is present to provide additional history. Pt's most recent psych hospitalization was in [place] last December. He lost his housing after that and moved to [place] to live in an assisted living facility. He is diagnosed with stage 4 colon cancer and schizoaffective disorder. Daughter states that he tends to focus on Muslims and Liseth. Pt has concerns for his safety with the number of Jainism people that are in his apartment complex. During triage, he noted that his \"sources\" told him today that he is \"Dru Ascencio's son.\" Pt's daughter is concerned that he is currently unable to care for himself outside of the hospital.  " place, and time. Mental status is at baseline.   Psychiatric:         Mood and Affect: Mood normal.         Behavior: Behavior normal.         Thought Content: Thought content normal.         Judgment: Judgment normal.         Scheduled Meds   amLODIPine, 5 mg, Oral, Daily  levothyroxine, 88 mcg, Oral, Q AM  lisinopril, 20 mg, Oral, Q24H  micafungin (MYCAMINE) IV, 100 mg, Intravenous, Q24H  piperacillin-tazobactam, 3.375 g, Intravenous, Q8H  [START ON 4/7/2024] vitamin D, 50,000 Units, Oral, Weekly       PRN Meds     albuterol    Morphine    ondansetron    Pharmacy to Dose Zosyn    Potassium Replacement - Follow Nurse / BPA Driven Protocol    [COMPLETED] Insert Peripheral IV **AND** sodium chloride   Infusions  Pharmacy to Dose Zosyn,   sodium chloride, 100 mL/hr, Last Rate: 100 mL/hr (04/05/24 1912)          Diagnostic Data    Results from last 7 days   Lab Units 04/06/24  0151 04/04/24  0538 04/03/24  1631   WBC 10*3/mm3 21.25*   < > 32.90*   HEMOGLOBIN g/dL 8.7*   < > 11.6*   HEMATOCRIT % 28.0*   < > 35.6   PLATELETS 10*3/mm3 344   < > 364   GLUCOSE mg/dL 72   < > 131*   CREATININE mg/dL 0.52*   < > 1.06*   BUN mg/dL 15   < > 15   SODIUM mmol/L 138   < > 133*   POTASSIUM mmol/L 3.2*   < > 3.1*   AST (SGOT) U/L  --   --  53*   ALT (SGPT) U/L  --   --  80*   ALK PHOS U/L  --   --  135*   BILIRUBIN mg/dL  --   --  1.2   ANION GAP mmol/L 13.0   < > 13.0    < > = values in this interval not displayed.       XR Chest 1 View    Result Date: 4/6/2024  Impression: No active disease Electronically Signed: Patel Sunshine MD  4/6/2024 6:03 AM EDT  Workstation ID: [id number]    CT Abdomen Pelvis With Contrast    Result Date: 4/5/2024  Impression: Persistent moderate wall thickening within the proximal/mid sigmoid colon may represent diverticulitis or colitis of other etiology. 2 cm mural abscess is centered on series 3, image 101. Additional 2.8 x 1.3 cm peripherally enhancing fluid collection on  series 3, image 84, compatible  "with additional small abscess. There is moderate fat stranding within the lower abdomen, and there is mild pelvic free fluid. Findings appear overall similar since 4/3/2024. No free air is identified. Electronically Signed: Eben Hoover MD  4/5/2024 11:18 AM EDT  Workstation ID: [id number]       I reviewed the patient's new clinical results.    Assessment/Plan:     Active and Resolved Problems  Active Hospital Problems    Diagnosis  POA    **Colitis with abscess [K52.9, K63.0]  Yes    Hypokalemia [E87.6]  Yes    Elevated LFTs [R79.89]  Yes    Acute renal insufficiency [N28.9]  Yes    Leukocytosis [D72.829]  Yes    Hypothyroidism [E03.9]  Yes    Essential hypertension [I10]  Yes      Resolved Hospital Problems   No resolved problems to display.       Acute colitis with intramural abscess with sepsis (POA)  -Patient has underlying infectious process with colitis and small intramural abscess with significant leukocytosis and tachycardia, all consistent with sepsis  -Ct done 4/3 showing \"Severe inflammation of the colon with intramural abscess. Findings may indicate diverticulitis or focal colitis. Limited characterization due to intense inflammation. There is secondary severe inflammation of an adjacent small bowel without fistulization appreciated.\"  -Patient WBC 21.25, improved from 31/46 yesterday   -Blood cultures negative to date  -Continue IV antibiotics per ID recommendation   -ID consult placed  -Start clear liquid diet today   -Discussed with patient regarding possible need for surgical intervention with partial colon resection and colostomy; if patient's symptoms get worse, she would likely require surgical intervention  -C diff negative     Hypertension  -Continue medications with lisinopril, amlodipine but holding HCTZ  -/83 today, increased amlodipine to 10mg      Hypothyroidism  -Continue Synthroid     Mild HERSON  -Renal function appears stable  -Creatinine 0.52 today   -Avoid nephrotoxic medications " if able     Acute hypokalemia  -Repleted       DVT prophylaxis:  Mechanical DVT prophylaxis orders are present.         Code status is   Code Status and Medical Interventions:   Ordered at: 04/03/24 2211     Code Status (Patient has no pulse and is not breathing):    CPR (Attempt to Resuscitate)     Medical Interventions (Patient has pulse or is breathing):    Full Support       Plan for disposition:Pending clinical course     Time: 30 minutes    Signature: Electronically signed by JAIRO Eaton, 04/06/24, 12:32 EDT.  [facility] Hospitalist Team

## 2024-04-06 NOTE — PLAN OF CARE
Goal Outcome Evaluation:            Pt is up in chair. Able to make needs known. Medicating pain per MAR. Diet advanced to clear liquids. Denies nausea but had 1 episode of diarrhea. Positive for simple sepsis. Replacing potassium per protocol. VSS. Plan of care ongoing.

## 2024-04-07 LAB
ALBUMIN SERPL-MCNC: 3.1 G/DL (ref 3.5–5.2)
ALBUMIN/GLOB SERPL: 1.1 G/DL
ALP SERPL-CCNC: 86 U/L (ref 39–117)
ALT SERPL W P-5'-P-CCNC: 26 U/L (ref 1–33)
ANION GAP SERPL CALCULATED.3IONS-SCNC: 8 MMOL/L (ref 5–15)
AST SERPL-CCNC: 21 U/L (ref 1–32)
BASOPHILS # BLD AUTO: 0.07 10*3/MM3 (ref 0–0.2)
BASOPHILS NFR BLD AUTO: 0.4 % (ref 0–1.5)
BILIRUB SERPL-MCNC: 0.5 MG/DL (ref 0–1.2)
BUN SERPL-MCNC: 8 MG/DL (ref 8–23)
BUN/CREAT SERPL: 15.1 (ref 7–25)
CALCIUM SPEC-SCNC: 8.6 MG/DL (ref 8.6–10.5)
CHLORIDE SERPL-SCNC: 101 MMOL/L (ref 98–107)
CO2 SERPL-SCNC: 31 MMOL/L (ref 22–29)
CREAT SERPL-MCNC: 0.53 MG/DL (ref 0.57–1)
DEPRECATED RDW RBC AUTO: 44.5 FL (ref 37–54)
EGFRCR SERPLBLD CKD-EPI 2021: 105.4 ML/MIN/1.73
EOSINOPHIL # BLD AUTO: 0.25 10*3/MM3 (ref 0–0.4)
EOSINOPHIL NFR BLD AUTO: 1.5 % (ref 0.3–6.2)
ERYTHROCYTE [DISTWIDTH] IN BLOOD BY AUTOMATED COUNT: 12.9 % (ref 12.3–15.4)
GLOBULIN UR ELPH-MCNC: 2.9 GM/DL
GLUCOSE SERPL-MCNC: 94 MG/DL (ref 65–99)
HCT VFR BLD AUTO: 32 % (ref 34–46.6)
HGB BLD-MCNC: 9.9 G/DL (ref 12–15.9)
IMM GRANULOCYTES # BLD AUTO: 0.24 10*3/MM3 (ref 0–0.05)
IMM GRANULOCYTES NFR BLD AUTO: 1.5 % (ref 0–0.5)
LYMPHOCYTES # BLD AUTO: 2.11 10*3/MM3 (ref 0.7–3.1)
LYMPHOCYTES NFR BLD AUTO: 12.9 % (ref 19.6–45.3)
MCH RBC QN AUTO: 29.1 PG (ref 26.6–33)
MCHC RBC AUTO-ENTMCNC: 30.9 G/DL (ref 31.5–35.7)
MCV RBC AUTO: 94.1 FL (ref 79–97)
MONOCYTES # BLD AUTO: 1.37 10*3/MM3 (ref 0.1–0.9)
MONOCYTES NFR BLD AUTO: 8.3 % (ref 5–12)
NEUTROPHILS NFR BLD AUTO: 12.37 10*3/MM3 (ref 1.7–7)
NEUTROPHILS NFR BLD AUTO: 75.4 % (ref 42.7–76)
NRBC BLD AUTO-RTO: 0 /100 WBC (ref 0–0.2)
PLATELET # BLD AUTO: 450 10*3/MM3 (ref 140–450)
PMV BLD AUTO: 9.6 FL (ref 6–12)
POTASSIUM SERPL-SCNC: 3.6 MMOL/L (ref 3.5–5.2)
POTASSIUM SERPL-SCNC: 3.7 MMOL/L (ref 3.5–5.2)
PROT SERPL-MCNC: 6 G/DL (ref 6–8.5)
RBC # BLD AUTO: 3.4 10*6/MM3 (ref 3.77–5.28)
SODIUM SERPL-SCNC: 140 MMOL/L (ref 136–145)
WBC NRBC COR # BLD AUTO: 16.41 10*3/MM3 (ref 3.4–10.8)

## 2024-04-07 PROCEDURE — 25010000002 PIPERACILLIN SOD-TAZOBACTAM PER 1 G: Performed by: NURSE PRACTITIONER

## 2024-04-07 PROCEDURE — 80053 COMPREHEN METABOLIC PANEL: CPT | Performed by: FAMILY MEDICINE

## 2024-04-07 PROCEDURE — 85025 COMPLETE CBC W/AUTO DIFF WBC: CPT | Performed by: NURSE PRACTITIONER

## 2024-04-07 PROCEDURE — 84132 ASSAY OF SERUM POTASSIUM: CPT | Performed by: FAMILY MEDICINE

## 2024-04-07 PROCEDURE — 25010000002 MICAFUNGIN SODIUM 100 MG RECONSTITUTED SOLUTION 1 EACH VIAL: Performed by: INTERNAL MEDICINE

## 2024-04-07 PROCEDURE — 99232 SBSQ HOSP IP/OBS MODERATE 35: CPT

## 2024-04-07 PROCEDURE — 25010000002 MORPHINE PER 10 MG: Performed by: NURSE PRACTITIONER

## 2024-04-07 PROCEDURE — 25810000003 SODIUM CHLORIDE 0.9 % SOLUTION

## 2024-04-07 RX ORDER — HYDROCODONE BITARTRATE AND ACETAMINOPHEN 10; 325 MG/1; MG/1
1 TABLET ORAL EVERY 6 HOURS PRN
Status: DISCONTINUED | OUTPATIENT
Start: 2024-04-07 | End: 2024-04-09 | Stop reason: HOSPADM

## 2024-04-07 RX ORDER — POTASSIUM CHLORIDE 20 MEQ/1
40 TABLET, EXTENDED RELEASE ORAL EVERY 4 HOURS
Status: COMPLETED | OUTPATIENT
Start: 2024-04-07 | End: 2024-04-07

## 2024-04-07 RX ADMIN — AMLODIPINE BESYLATE 10 MG: 5 TABLET ORAL at 08:26

## 2024-04-07 RX ADMIN — MICAFUNGIN SODIUM 100 MG: 100 INJECTION, POWDER, LYOPHILIZED, FOR SOLUTION INTRAVENOUS at 18:23

## 2024-04-07 RX ADMIN — POTASSIUM CHLORIDE 40 MEQ: 1500 TABLET, EXTENDED RELEASE ORAL at 12:17

## 2024-04-07 RX ADMIN — MORPHINE SULFATE 2 MG: 2 INJECTION, SOLUTION INTRAMUSCULAR; INTRAVENOUS at 06:37

## 2024-04-07 RX ADMIN — POTASSIUM CHLORIDE 40 MEQ: 1500 TABLET, EXTENDED RELEASE ORAL at 06:31

## 2024-04-07 RX ADMIN — MORPHINE SULFATE 2 MG: 2 INJECTION, SOLUTION INTRAMUSCULAR; INTRAVENOUS at 01:37

## 2024-04-07 RX ADMIN — LEVOTHYROXINE SODIUM 88 MCG: 88 TABLET ORAL at 06:31

## 2024-04-07 RX ADMIN — Medication 10 ML: at 08:26

## 2024-04-07 RX ADMIN — MORPHINE SULFATE 2 MG: 2 INJECTION, SOLUTION INTRAMUSCULAR; INTRAVENOUS at 12:17

## 2024-04-07 RX ADMIN — LISINOPRIL 20 MG: 20 TABLET ORAL at 08:26

## 2024-04-07 RX ADMIN — SODIUM CHLORIDE 50 ML/HR: 9 INJECTION, SOLUTION INTRAVENOUS at 06:36

## 2024-04-07 RX ADMIN — ERGOCALCIFEROL 50000 UNITS: 1.25 CAPSULE ORAL at 08:26

## 2024-04-07 RX ADMIN — PIPERACILLIN AND TAZOBACTAM 3.38 G: 3; .375 INJECTION, POWDER, FOR SOLUTION INTRAVENOUS at 06:31

## 2024-04-07 RX ADMIN — PIPERACILLIN AND TAZOBACTAM 3.38 G: 3; .375 INJECTION, POWDER, FOR SOLUTION INTRAVENOUS at 14:01

## 2024-04-07 RX ADMIN — PIPERACILLIN AND TAZOBACTAM 3.38 G: 3; .375 INJECTION, POWDER, FOR SOLUTION INTRAVENOUS at 22:10

## 2024-04-07 NOTE — PLAN OF CARE
Goal Outcome Evaluation: Upon change in shift, patient ambulated to the bathroom. Provider came and saw patient, changed diet to Full liquids. Complaints of pain, see MAR for interventions. Electrolyte protocol indicated for potassium, able to make needs known and care on going.

## 2024-04-07 NOTE — PLAN OF CARE
Goal Outcome Evaluation:         Pt is resting in bed. Able to make needs known. Medicating pain per MAR. Diet advanced to full liquids. Replacing potassium per protocol. Encouraged to ambulate. VSS. Plan of care ongoing.

## 2024-04-07 NOTE — PROGRESS NOTES
General Surgery Progress Note    Name: Kirby Gunter ADMIT: 4/3/2024   : 1965  PCP: Chava López MD    MRN: [id number] LOS: 4 days   AGE/SEX: 59 y.o. female  ROOM: [address] [place] [address]    Chief Complaint   Patient presents with    Abdominal Pain     Abd pain, started on Friday, pt is nauseated, pt was seen at urgent care and given medications for constipation that worked but still feeling pain.       Subjective     Patient seen and examined.  Reports feeling better overall, no new complaints.  Tolerating clear liquids, denies nausea and vomiting.  Passing flatus, having BMs.  Leukocytosis improving, 16.41 (21.25) this morning.    Objective     Scheduled Medications:   amLODIPine, 10 mg, Oral, Daily  levothyroxine, 88 mcg, Oral, Q AM  lisinopril, 20 mg, Oral, Q24H  micafungin (MYCAMINE) IV, 100 mg, Intravenous, Q24H  piperacillin-tazobactam, 3.375 g, Intravenous, Q8H  vitamin D, 50,000 Units, Oral, Weekly        Active Infusions:  sodium chloride, 100 mL/hr, Last Rate: 100 mL/hr (24 2333)        As Needed Medications:    albuterol    Morphine    ondansetron    Potassium Replacement - Follow Nurse / BPA Driven Protocol    [COMPLETED] Insert Peripheral IV **AND** sodium chloride    Vital Signs  Vital Signs Patient Vitals for the past 24 hrs:   BP Temp Temp src Pulse Resp SpO2   24 2000 -- 98.3 °F (36.8 °C) Oral 97 16 90 %   24 1257 -- 97.9 °F (36.6 °C) Axillary 107 20 96 %   24 0440 160/83 98 °F (36.7 °C) Oral 112 18 94 %     I/O:  I/O last 3 completed shifts:  In: 240 [P.O.:240]  Out: 2200 [Urine:2200]    Physical Exam:  Physical Exam  Constitutional:       General: She is not in acute distress.  Cardiovascular:      Rate and Rhythm: Normal rate.   Pulmonary:      Effort: Pulmonary effort is normal. No respiratory distress.   Abdominal:      Palpations: Abdomen is soft.      Comments: Soft, minimal distention noted, improved tenderness to palpation.  Umbilical/ventral hernia  noted.   Neurological:      Mental Status: She is alert and oriented to person, place, and time.   Psychiatric:         Mood and Affect: Mood normal.         Behavior: Behavior normal.         Results Review:     CBC    Results from last 7 days   Lab Units 04/07/24  0144 04/06/24  0151 04/05/24  0346 04/04/24  0538 04/03/24  1631   WBC 10*3/mm3 16.41* 21.25* 31.46* 33.58* 32.90*   HEMOGLOBIN g/dL 9.9* 8.7* 9.3* 11.0* 11.6*   PLATELETS 10*3/mm3 450 344 317 358 364     BMP   Results from last 7 days   Lab Units 04/07/24  0144 04/06/24  1328 04/06/24  0151 04/05/24  0346 04/04/24  0538 04/03/24  1631   SODIUM mmol/L 140  --  138 133* 134* 133*   POTASSIUM mmol/L 3.6 4.0 3.2* 3.7 3.8 3.1*   CHLORIDE mmol/L 101  --  102 96* 93* 90*   CO2 mmol/L 31.0*  --  23.0 25.0 30.0* 30.0*   BUN mg/dL 8  --  15 25* 19 15   CREATININE mg/dL 0.53*  --  0.52* 0.89 1.05* 1.06*   GLUCOSE mg/dL 94  --  72 83 88 131*     Radiology(recent) XR Chest 1 View    Result Date: 4/6/2024  Impression: No active disease Electronically Signed: Patel Sunshine MD  4/6/2024 6:03 AM EDT  Workstation ID: [id number]    CT Abdomen Pelvis With Contrast    Result Date: 4/5/2024  Impression: Persistent moderate wall thickening within the proximal/mid sigmoid colon may represent diverticulitis or colitis of other etiology. 2 cm mural abscess is centered on series 3, image 101. Additional 2.8 x 1.3 cm peripherally enhancing fluid collection on  series 3, image 84, compatible with additional small abscess. There is moderate fat stranding within the lower abdomen, and there is mild pelvic free fluid. Findings appear overall similar since 4/3/2024. No free air is identified. Electronically Signed: Eben Hoover MD  4/5/2024 11:18 AM EDT  Workstation ID: [id number]     I reviewed the patient's new clinical results.    Assessment & Plan       Colitis with abscess    Essential hypertension    Hypothyroidism    Hypokalemia    Elevated LFTs    Acute renal insufficiency     Leukocytosis      59 y.o. female admitted 4/3/2024 with severe diverticulitis    Advanced diet to full liquids  Continue low-dose IV fluids until p.o. intake increases  Antiemetics and pain medication as needed  Continue IV antibiotic per ID, will appreciate recommendations  Trend WBC count, 16.41 (21.25) this morning  Monitor replace electrolytes as needed  Continue to encourage ambulation, out of bed to chair  Giving clinical improvement, will consider diet advancement as patient's appetite increases.  Hopeful to discharge in the next couple days.          This note was created using Dragon Voice Recognition software.    HOWARD Castro  04/07/24  03:29 EDT

## 2024-04-07 NOTE — PROGRESS NOTES
[facility] MEDICINE SERVICE  DAILY PROGRESS NOTE    NAME: Kirby Gunter  : 1965  MRN: [id number]      LOS: 4 days     PROVIDER OF SERVICE: JAIRO Eaton    Chief Complaint: Colitis with abscess    Subjective:     Interval History:  History taken from: patient  Patient Complaints: Abdominal tenderness   Patient Denies:  Chest pain, shortness of breath, dizziness, headache     Review of Systems:   Review of Systems   Constitutional:  Negative for chills and fatigue.   Respiratory:  Negative for chest tightness and shortness of breath.    Gastrointestinal:  Positive for abdominal pain.       Objective:     Vital Signs  Temp:  [97.9 °F (36.6 °C)-98.3 °F (36.8 °C)] 97.9 °F (36.6 °C)  Heart Rate:  [87-99] 99  Resp:  [16-22] 22  BP: (118-132)/(75) 132/75  Flow (L/min):  [2] 2   Body mass index is 26.85 kg/m².    Physical Exam  Physical Exam  Constitutional:       Appearance: Normal appearance.   HENT:      Head: Normocephalic and atraumatic.      Nose: Nose normal.      Mouth/Throat:      Mouth: Mucous membranes are moist.   Eyes:      Extraocular Movements: Extraocular movements intact.      Conjunctiva/sclera: Conjunctivae normal.      Pupils: Pupils are equal, round, and reactive to light.   Cardiovascular:      Rate and Rhythm: Normal rate and regular rhythm.      Pulses: Normal pulses.      Heart sounds: Normal heart sounds.   Pulmonary:      Effort: Pulmonary effort is normal.      Breath sounds: Normal breath sounds.   Abdominal:      General: Abdomen is flat. Bowel sounds are normal.      Palpations: Abdomen is soft.      Tenderness: There is abdominal tenderness.   Musculoskeletal:         General: Normal range of motion.      Cervical back: Normal range of motion.   Skin:     General: Skin is warm and dry.   Neurological:      General: No focal deficit present.      Mental Status: She is alert and oriented to person, place, and time. Mental status is at baseline.   Psychiatric:          "Mood and Affect: Mood normal.         Behavior: Behavior normal.         Thought Content: Thought content normal.         Judgment: Judgment normal.         Scheduled Meds   amLODIPine, 10 mg, Oral, Daily  levothyroxine, 88 mcg, Oral, Q AM  lisinopril, 20 mg, Oral, Q24H  micafungin (MYCAMINE) IV, 100 mg, Intravenous, Q24H  piperacillin-tazobactam, 3.375 g, Intravenous, Q8H  vitamin D, 50,000 Units, Oral, Weekly       PRN Meds     albuterol    Morphine    ondansetron    Potassium Replacement - Follow Nurse / BPA Driven Protocol    [COMPLETED] Insert Peripheral IV **AND** sodium chloride   Infusions  sodium chloride, 50 mL/hr, Last Rate: 50 mL/hr (04/07/24 0636)          Diagnostic Data    Results from last 7 days   Lab Units 04/07/24  0144   WBC 10*3/mm3 16.41*   HEMOGLOBIN g/dL 9.9*   HEMATOCRIT % 32.0*   PLATELETS 10*3/mm3 450   GLUCOSE mg/dL 94   CREATININE mg/dL 0.53*   BUN mg/dL 8   SODIUM mmol/L 140   POTASSIUM mmol/L 3.6   AST (SGOT) U/L 21   ALT (SGPT) U/L 26   ALK PHOS U/L 86   BILIRUBIN mg/dL 0.5   ANION GAP mmol/L 8.0       XR Chest 1 View    Result Date: 4/6/2024  Impression: No active disease Electronically Signed: Patel Sunshine MD  4/6/2024 6:03 AM EDT  Workstation ID: [id number]       I reviewed the patient's new clinical results.    Assessment/Plan:     Active and Resolved Problems  Active Hospital Problems    Diagnosis  POA    **Colitis with abscess [K52.9, K63.0]  Yes    Hypokalemia [E87.6]  Yes    Elevated LFTs [R79.89]  Yes    Acute renal insufficiency [N28.9]  Yes    Leukocytosis [D72.829]  Yes    Hypothyroidism [E03.9]  Yes    Essential hypertension [I10]  Yes      Resolved Hospital Problems   No resolved problems to display.       Acute colitis with intramural abscess with sepsis (POA)  -Patient has underlying infectious process with colitis and small intramural abscess with significant leukocytosis and tachycardia, all consistent with sepsis  -Ct done 4/3 showing \"Severe inflammation of the colon " "with intramural abscess. Findings may indicate diverticulitis or focal colitis. Limited characterization due to intense inflammation. There is secondary severe inflammation of an adjacent small bowel without fistulization appreciated.\"  -Patient WBC 16.41 today, improved from 21.25 yesterday   -Blood cultures negative to date  -Continue IV antibiotics per ID recommendation   -ID consult placed  -Discussed with patient regarding possible need for surgical intervention with partial colon resection and colostomy; if patient's symptoms get worse, she would likely require surgical intervention  -C diff negative   -Start soft GI diet today, avoid diary        Hypertension  -Continue medications with lisinopril, amlodipine but holding HCTZ  -Increased amlodipine to 10mg yesterday  -/75 today      Hypothyroidism  -Continue Synthroid     Mild HERSON  -Renal function appears stable  -Creatinine 0.53 today   -Avoid nephrotoxic medications if able     Acute hypokalemia  -Repleted    DVT prophylaxis:  Mechanical DVT prophylaxis orders are present.         Code status is   Code Status and Medical Interventions:   Ordered at: 04/03/24 2211     Code Status (Patient has no pulse and is not breathing):    CPR (Attempt to Resuscitate)     Medical Interventions (Patient has pulse or is breathing):    Full Support       Plan for disposition:1-2 days if WBC continues to trend down and patient tolerates diet     Time: 30 minutes    Signature: Electronically signed by JAIRO Eaton, 04/07/24, 13:30 EDT.  [facility] Hospitalist Team   "

## 2024-04-07 NOTE — PROGRESS NOTES
Infectious Diseases Progress Note      LOS: 4 days   Patient Care Team:  Chava López MD as PCP - General  Chava López MD as PCP - Family Medicine    Chief Complaint: Abdominal pain    Subjective       Patient had no fever during the last 24 hours.  She continued to have abdominal pain but symptoms had improved significantly.  She is hemodynamically stable.  Still having some abdominal pain and distention with nausea but no vomiting or diarrhea    Review of Systems:   Review of Systems   Constitutional: Negative.    HENT: Negative.     Eyes: Negative.    Respiratory: Negative.     Cardiovascular: Negative.    Gastrointestinal:  Positive for abdominal pain and nausea.   Genitourinary: Negative.    Musculoskeletal: Negative.    Skin: Negative.    Neurological: Negative.    Hematological: Negative.    Psychiatric/Behavioral: Negative.          Objective     Vital Signs  Temp:  [97.9 °F (36.6 °C)-98.3 °F (36.8 °C)] 97.9 °F (36.6 °C)  Heart Rate:  [87-99] 99  Resp:  [16-22] 22  BP: (118-132)/(75) 132/75    Physical Exam:  Physical Exam  Vitals and nursing note reviewed.   Constitutional:       Appearance: She is well-developed.   HENT:      Head: Normocephalic and atraumatic.   Eyes:      Pupils: Pupils are equal, round, and reactive to light.   Cardiovascular:      Rate and Rhythm: Normal rate and regular rhythm.      Heart sounds: Normal heart sounds.   Pulmonary:      Effort: Pulmonary effort is normal. No respiratory distress.      Breath sounds: Normal breath sounds. No wheezing or rales.   Abdominal:      General: Bowel sounds are normal. There is no distension.      Palpations: Abdomen is soft. There is no mass.      Tenderness: There is abdominal tenderness. There is no guarding or rebound.   Musculoskeletal:         General: No deformity. Normal range of motion.      Cervical back: Normal range of motion and neck supple.   Skin:     General: Skin is warm.      Findings: No erythema or  rash.   Neurological:      Mental Status: She is alert and oriented to person, place, and time.      Cranial Nerves: No cranial nerve deficit.          Results Review:    I have reviewed all clinical data, test, lab, and imaging results.     Radiology  No Radiology Exams Resulted Within Past 24 Hours    Cardiology    Laboratory    Results from last 7 days   Lab Units 04/07/24  0144 04/06/24  0151 04/05/24  0346 04/04/24  0538 04/03/24  1631   WBC 10*3/mm3 16.41* 21.25* 31.46* 33.58* 32.90*   HEMOGLOBIN g/dL 9.9* 8.7* 9.3* 11.0* 11.6*   HEMATOCRIT % 32.0* 28.0* 29.5* 34.2 35.6   PLATELETS 10*3/mm3 450 344 317 358 364     Results from last 7 days   Lab Units 04/07/24  0144 04/06/24  1328 04/06/24  0151 04/05/24  0346 04/04/24  0538 04/03/24  1631   SODIUM mmol/L 140  --  138 133* 134* 133*   POTASSIUM mmol/L 3.6 4.0 3.2* 3.7 3.8 3.1*   CHLORIDE mmol/L 101  --  102 96* 93* 90*   CO2 mmol/L 31.0*  --  23.0 25.0 30.0* 30.0*   BUN mg/dL 8  --  15 25* 19 15   CREATININE mg/dL 0.53*  --  0.52* 0.89 1.05* 1.06*   GLUCOSE mg/dL 94  --  72 83 88 131*   ALBUMIN g/dL 3.1*  --   --   --   --  4.0   BILIRUBIN mg/dL 0.5  --   --   --   --  1.2   ALK PHOS U/L 86  --   --   --   --  135*   AST (SGOT) U/L 21  --   --   --   --  53*   ALT (SGPT) U/L 26  --   --   --   --  80*   CALCIUM mg/dL 8.6  --  8.0* 8.7 9.1 9.2                 Microbiology   Microbiology Results (last 10 days)       Procedure Component Value - Date/Time    Clostridioides difficile Toxin - Stool, Per Rectum [[id number]]  (Normal) Collected: 04/05/24 1208    Lab Status: Final result Specimen: Stool from Per Rectum Updated: 04/05/24 1243    Narrative:      The following orders were created for panel order Clostridioides difficile Toxin - Stool, Per Rectum.  Procedure                               Abnormality         Status                     ---------                               -----------         ------                     Clostridioides difficile...[[id number]]   Normal              Final result                 Please view results for these tests on the individual orders.    Clostridioides difficile EIA - Stool, Per Rectum [[id number]]  (Normal) Collected: 04/05/24 1208    Lab Status: Final result Specimen: Stool from Per Rectum Updated: 04/05/24 1245     C Diff GDH Ag Negative     C.diff Toxin Ag Negative    Narrative:      The result indicates the absence of toxigenic C.difficile from stool specimen.    MRSA Screen, PCR (Inpatient) - Swab, Nares [[id number]]  (Normal) Collected: 04/04/24 0440    Lab Status: Final result Specimen: Swab from Nares Updated: 04/04/24 0635     MRSA PCR No MRSA Detected    Narrative:      The negative predictive value of this diagnostic test is high and should only be used to consider de-escalating anti-MRSA therapy. A positive result may indicate colonization with MRSA and must be correlated clinically.    Blood Culture - Blood, Arm, Right [[id number]]  (Normal) Collected: 04/03/24 2334    Lab Status: Preliminary result Specimen: Blood from Arm, Right Updated: 04/07/24 0000     Blood Culture No growth at 3 days    Blood Culture - Blood, Arm, Right [[id number]]  (Normal) Collected: 04/03/24 2334    Lab Status: Preliminary result Specimen: Blood from Arm, Right Updated: 04/07/24 0000     Blood Culture No growth at 3 days            Medication Review:       Schedule Meds  amLODIPine, 10 mg, Oral, Daily  levothyroxine, 88 mcg, Oral, Q AM  lisinopril, 20 mg, Oral, Q24H  micafungin (MYCAMINE) IV, 100 mg, Intravenous, Q24H  piperacillin-tazobactam, 3.375 g, Intravenous, Q8H  vitamin D, 50,000 Units, Oral, Weekly        Infusion Meds  sodium chloride, 50 mL/hr, Last Rate: 50 mL/hr (04/07/24 0636)        PRN Meds    albuterol    Morphine    ondansetron    Potassium Replacement - Follow Nurse / BPA Driven Protocol    [COMPLETED] Insert Peripheral IV **AND** sodium chloride        Assessment & Plan       Antimicrobial Therapy   1.  IV Zosyn        2.  IV  micafungin        3.        4.        5.            Assessment     Acute diverticulitis.  This is the patient's first episode.  Abdomen CT scan shows several small mural abscesses-likely too small to be drained.  Blood cultures are negative so far     Severe leukocytosis.  Secondary to above.  White count started to improve.  Stool was negative for C. difficile toxin and antigen     Plan     Continue IV Zosyn 3.375 g every 8 hours  Continue IV micafungin 100 mg every 24 hours  Patient will need at least 2 weeks of IV antimicrobial therapy  General surgery also following-May need sigmoidectomy and end colostomy if she does not clinically improve this admission  Continue supportive care  A.m. labs  May need to repeat CT scan of abdomen and pelvis in the next 1 to 2 days      JAIRO Jenkins  04/07/24  14:18 EDT    Note is dictated utilizing voice recognition software/Dragon

## 2024-04-08 LAB
ANION GAP SERPL CALCULATED.3IONS-SCNC: 7 MMOL/L (ref 5–15)
BASOPHILS # BLD AUTO: 0.09 10*3/MM3 (ref 0–0.2)
BASOPHILS NFR BLD AUTO: 0.6 % (ref 0–1.5)
BUN SERPL-MCNC: 5 MG/DL (ref 8–23)
BUN/CREAT SERPL: 9.8 (ref 7–25)
CALCIUM SPEC-SCNC: 8.7 MG/DL (ref 8.6–10.5)
CHLORIDE SERPL-SCNC: 102 MMOL/L (ref 98–107)
CO2 SERPL-SCNC: 30 MMOL/L (ref 22–29)
CREAT SERPL-MCNC: 0.51 MG/DL (ref 0.57–1)
DEPRECATED RDW RBC AUTO: 44.2 FL (ref 37–54)
EGFRCR SERPLBLD CKD-EPI 2021: 106.4 ML/MIN/1.73
EOSINOPHIL # BLD AUTO: 0.34 10*3/MM3 (ref 0–0.4)
EOSINOPHIL NFR BLD AUTO: 2.2 % (ref 0.3–6.2)
ERYTHROCYTE [DISTWIDTH] IN BLOOD BY AUTOMATED COUNT: 12.8 % (ref 12.3–15.4)
GLUCOSE SERPL-MCNC: 98 MG/DL (ref 65–99)
HCT VFR BLD AUTO: 30.8 % (ref 34–46.6)
HGB BLD-MCNC: 9.6 G/DL (ref 12–15.9)
IMM GRANULOCYTES # BLD AUTO: 0.21 10*3/MM3 (ref 0–0.05)
IMM GRANULOCYTES NFR BLD AUTO: 1.3 % (ref 0–0.5)
LYMPHOCYTES # BLD AUTO: 2.4 10*3/MM3 (ref 0.7–3.1)
LYMPHOCYTES NFR BLD AUTO: 15.3 % (ref 19.6–45.3)
MCH RBC QN AUTO: 29.3 PG (ref 26.6–33)
MCHC RBC AUTO-ENTMCNC: 31.2 G/DL (ref 31.5–35.7)
MCV RBC AUTO: 93.9 FL (ref 79–97)
MONOCYTES # BLD AUTO: 1.34 10*3/MM3 (ref 0.1–0.9)
MONOCYTES NFR BLD AUTO: 8.5 % (ref 5–12)
NEUTROPHILS NFR BLD AUTO: 11.34 10*3/MM3 (ref 1.7–7)
NEUTROPHILS NFR BLD AUTO: 72.1 % (ref 42.7–76)
NRBC BLD AUTO-RTO: 0 /100 WBC (ref 0–0.2)
PLATELET # BLD AUTO: 452 10*3/MM3 (ref 140–450)
PMV BLD AUTO: 9.6 FL (ref 6–12)
POTASSIUM SERPL-SCNC: 3.9 MMOL/L (ref 3.5–5.2)
RBC # BLD AUTO: 3.28 10*6/MM3 (ref 3.77–5.28)
SODIUM SERPL-SCNC: 139 MMOL/L (ref 136–145)
WBC NRBC COR # BLD AUTO: 15.72 10*3/MM3 (ref 3.4–10.8)

## 2024-04-08 PROCEDURE — 25010000002 PIPERACILLIN SOD-TAZOBACTAM PER 1 G: Performed by: NURSE PRACTITIONER

## 2024-04-08 PROCEDURE — 80048 BASIC METABOLIC PNL TOTAL CA: CPT | Performed by: NURSE PRACTITIONER

## 2024-04-08 PROCEDURE — 99232 SBSQ HOSP IP/OBS MODERATE 35: CPT

## 2024-04-08 PROCEDURE — 85025 COMPLETE CBC W/AUTO DIFF WBC: CPT | Performed by: NURSE PRACTITIONER

## 2024-04-08 RX ADMIN — HYDROCODONE BITARTRATE AND ACETAMINOPHEN 1 TABLET: 10; 325 TABLET ORAL at 11:34

## 2024-04-08 RX ADMIN — LEVOTHYROXINE SODIUM 88 MCG: 88 TABLET ORAL at 06:17

## 2024-04-08 RX ADMIN — PIPERACILLIN AND TAZOBACTAM 3.38 G: 3; .375 INJECTION, POWDER, FOR SOLUTION INTRAVENOUS at 06:17

## 2024-04-08 RX ADMIN — HYDROCODONE BITARTRATE AND ACETAMINOPHEN 1 TABLET: 10; 325 TABLET ORAL at 01:03

## 2024-04-08 RX ADMIN — AMLODIPINE BESYLATE 10 MG: 5 TABLET ORAL at 08:31

## 2024-04-08 RX ADMIN — PIPERACILLIN AND TAZOBACTAM 3.38 G: 3; .375 INJECTION, POWDER, FOR SOLUTION INTRAVENOUS at 13:04

## 2024-04-08 RX ADMIN — HYDROCODONE BITARTRATE AND ACETAMINOPHEN 1 TABLET: 10; 325 TABLET ORAL at 20:14

## 2024-04-08 RX ADMIN — FLUCONAZOLE 400 MG: 150 TABLET ORAL at 17:12

## 2024-04-08 RX ADMIN — LISINOPRIL 20 MG: 20 TABLET ORAL at 08:31

## 2024-04-08 RX ADMIN — PIPERACILLIN AND TAZOBACTAM 3.38 G: 3; .375 INJECTION, POWDER, FOR SOLUTION INTRAVENOUS at 22:14

## 2024-04-08 NOTE — PROGRESS NOTES
Infectious Diseases Progress Note      LOS: 5 days   Patient Care Team:  Chava López MD as PCP - General  Chava López MD as PCP - Family Medicine    Chief Complaint: Abdominal pain    Subjective       Patient had no fever during the last 24 hours.  She continued to have abdominal pain but symptoms had improved significantly.  She is hemodynamically stable.  Improvement to abdominal pain today.  Has been able to tolerate some solid food    Review of Systems:   Review of Systems   Constitutional: Negative.    HENT: Negative.     Eyes: Negative.    Respiratory: Negative.     Cardiovascular: Negative.    Gastrointestinal:  Positive for abdominal pain.   Genitourinary: Negative.    Musculoskeletal: Negative.    Skin: Negative.    Neurological: Negative.    Hematological: Negative.    Psychiatric/Behavioral: Negative.          Objective     Vital Signs  Temp:  [97.7 °F (36.5 °C)-98.2 °F (36.8 °C)] 98.2 °F (36.8 °C)  Heart Rate:  [71-89] 86  Resp:  [14-17] 17  BP: (107-125)/(65-84) 125/81    Physical Exam:  Physical Exam  Vitals and nursing note reviewed.   Constitutional:       Appearance: She is well-developed.   HENT:      Head: Normocephalic and atraumatic.   Eyes:      Pupils: Pupils are equal, round, and reactive to light.   Cardiovascular:      Rate and Rhythm: Normal rate and regular rhythm.      Heart sounds: Normal heart sounds.   Pulmonary:      Effort: Pulmonary effort is normal. No respiratory distress.      Breath sounds: Normal breath sounds. No wheezing or rales.   Abdominal:      General: Bowel sounds are normal. There is no distension.      Palpations: Abdomen is soft. There is no mass.      Tenderness: There is abdominal tenderness. There is no guarding or rebound.   Musculoskeletal:         General: No deformity. Normal range of motion.      Cervical back: Normal range of motion and neck supple.   Skin:     General: Skin is warm.      Findings: No erythema or rash.    Neurological:      Mental Status: She is alert and oriented to person, place, and time.      Cranial Nerves: No cranial nerve deficit.          Results Review:    I have reviewed all clinical data, test, lab, and imaging results.     Radiology  No Radiology Exams Resulted Within Past 24 Hours    Cardiology    Laboratory    Results from last 7 days   Lab Units 04/08/24  0042 04/07/24  0144 04/06/24  0151 04/05/24  0346 04/04/24  0538 04/03/24  1631   WBC 10*3/mm3 15.72* 16.41* 21.25* 31.46* 33.58* 32.90*   HEMOGLOBIN g/dL 9.6* 9.9* 8.7* 9.3* 11.0* 11.6*   HEMATOCRIT % 30.8* 32.0* 28.0* 29.5* 34.2 35.6   PLATELETS 10*3/mm3 452* 450 344 317 358 364     Results from last 7 days   Lab Units 04/08/24  0042 04/07/24  1620 04/07/24  0144 04/06/24  1328 04/06/24  0151 04/05/24  0346 04/04/24  0538 04/03/24  1631   SODIUM mmol/L 139  --  140  --  138 133* 134* 133*   POTASSIUM mmol/L 3.9 3.7 3.6 4.0 3.2* 3.7 3.8 3.1*   CHLORIDE mmol/L 102  --  101  --  102 96* 93* 90*   CO2 mmol/L 30.0*  --  31.0*  --  23.0 25.0 30.0* 30.0*   BUN mg/dL 5*  --  8  --  15 25* 19 15   CREATININE mg/dL 0.51*  --  0.53*  --  0.52* 0.89 1.05* 1.06*   GLUCOSE mg/dL 98  --  94  --  72 83 88 131*   ALBUMIN g/dL  --   --  3.1*  --   --   --   --  4.0   BILIRUBIN mg/dL  --   --  0.5  --   --   --   --  1.2   ALK PHOS U/L  --   --  86  --   --   --   --  135*   AST (SGOT) U/L  --   --  21  --   --   --   --  53*   ALT (SGPT) U/L  --   --  26  --   --   --   --  80*   CALCIUM mg/dL 8.7  --  8.6  --  8.0* 8.7 9.1 9.2                 Microbiology   Microbiology Results (last 10 days)       Procedure Component Value - Date/Time    Clostridioides difficile Toxin - Stool, Per Rectum [[id number]]  (Normal) Collected: 04/05/24 1208    Lab Status: Final result Specimen: Stool from Per Rectum Updated: 04/05/24 1245    Narrative:      The following orders were created for panel order Clostridioides difficile Toxin - Stool, Per Rectum.  Procedure                                Abnormality         Status                     ---------                               -----------         ------                     Clostridioides difficile...[[id number]]  Normal              Final result                 Please view results for these tests on the individual orders.    Clostridioides difficile EIA - Stool, Per Rectum [[id number]]  (Normal) Collected: 04/05/24 1208    Lab Status: Final result Specimen: Stool from Per Rectum Updated: 04/05/24 1245     C Diff GDH Ag Negative     C.diff Toxin Ag Negative    Narrative:      The result indicates the absence of toxigenic C.difficile from stool specimen.    MRSA Screen, PCR (Inpatient) - Swab, Nares [[id number]]  (Normal) Collected: 04/04/24 0440    Lab Status: Final result Specimen: Swab from Nares Updated: 04/04/24 0635     MRSA PCR No MRSA Detected    Narrative:      The negative predictive value of this diagnostic test is high and should only be used to consider de-escalating anti-MRSA therapy. A positive result may indicate colonization with MRSA and must be correlated clinically.    Blood Culture - Blood, Arm, Right [[id number]]  (Normal) Collected: 04/03/24 2334    Lab Status: Preliminary result Specimen: Blood from Arm, Right Updated: 04/08/24 0000     Blood Culture No growth at 4 days    Blood Culture - Blood, Arm, Right [[id number]]  (Normal) Collected: 04/03/24 2334    Lab Status: Preliminary result Specimen: Blood from Arm, Right Updated: 04/08/24 0000     Blood Culture No growth at 4 days            Medication Review:       Schedule Meds  amLODIPine, 10 mg, Oral, Daily  levothyroxine, 88 mcg, Oral, Q AM  lisinopril, 20 mg, Oral, Q24H  micafungin (MYCAMINE) IV, 100 mg, Intravenous, Q24H  piperacillin-tazobactam, 3.375 g, Intravenous, Q8H  vitamin D, 50,000 Units, Oral, Weekly        Infusion Meds         PRN Meds    albuterol    HYDROcodone-acetaminophen    Morphine    ondansetron    Potassium Replacement - Follow Nurse / BPA Driven  Protocol    [COMPLETED] Insert Peripheral IV **AND** sodium chloride        Assessment & Plan       Antimicrobial Therapy   1.  IV Zosyn        2.  IV micafungin        3.  P.o. Diflucan        4.        5.            Assessment     Acute diverticulitis.  This is the patient's first episode.  Abdomen CT scan shows several small mural abscesses-likely too small to be drained.  Blood cultures are negative so far     Severe leukocytosis.  Secondary to above.  White count started to improve.  Stool was negative for C. difficile toxin and antigen     Plan     Continue IV Zosyn 3.375 g every 8 hours patient will need 2 weeks of antimicrobial treatment  Discontinue IV micafungin  Start p.o. Diflucan 400 mg every 24 hours for 11 days for 14 days of treatment  CT of the abdomen pelvis with contrast  Consult case management  Continue supportive care  A.m. labs  If CT does not show any significant new findings patient can likely discharge home tomorrow   Case discussed with hospitalist        Sangeeta Silver, JAIRO  04/08/24  15:12 EDT    Note is dictated utilizing voice recognition software/Dragon

## 2024-04-08 NOTE — PROGRESS NOTES
General Surgery Progress Note    Name: Kirby Gunter ADMIT: 4/3/2024   : 1965  PCP: Chava López MD    MRN: [id number] LOS: 5 days   AGE/SEX: 59 y.o. female  ROOM: [address] [place] [address]    Chief Complaint   Patient presents with    Abdominal Pain     Abd pain, started on Friday, pt is nauseated, pt was seen at urgent care and given medications for constipation that worked but still feeling pain.       Subjective     Patient seen and examined.  Vital signs stable, afebrile.  Complaining of some numbness near her umbilical area, reports abdominal pain this morning.  Pain is controlled with p.o. medication.  Tolerating GI soft diet, denies nausea or vomiting.  Passing flatus and having bowel movements.  Leukocytosis improving, hemoglobin stable.  On IV Zosyn and IV micafungin    Objective     Scheduled Medications:   amLODIPine, 10 mg, Oral, Daily  levothyroxine, 88 mcg, Oral, Q AM  lisinopril, 20 mg, Oral, Q24H  micafungin (MYCAMINE) IV, 100 mg, Intravenous, Q24H  piperacillin-tazobactam, 3.375 g, Intravenous, Q8H  vitamin D, 50,000 Units, Oral, Weekly        Active Infusions:  sodium chloride, 50 mL/hr, Last Rate: 50 mL/hr (24 0636)        As Needed Medications:    albuterol    HYDROcodone-acetaminophen    Morphine    ondansetron    Potassium Replacement - Follow Nurse / BPA Driven Protocol    [COMPLETED] Insert Peripheral IV **AND** sodium chloride    Vital Signs  Vital Signs Patient Vitals for the past 24 hrs:   BP Temp Temp src Pulse Resp SpO2   24 0831 124/84 -- -- 83 -- --   24 0358 107/65 97.7 °F (36.5 °C) Oral 71 14 99 %   24 2100 124/78 98.1 °F (36.7 °C) Oral 89 16 95 %   24 1115 132/75 97.9 °F (36.6 °C) Oral 99 22 92 %     I/O:  I/O last 3 completed shifts:  In: 780 [P.O.:780]  Out: 3600 [Urine:3600]    Physical Exam:  Physical Exam  Constitutional:       General: She is not in acute distress.  Cardiovascular:      Rate and Rhythm: Normal rate.   Pulmonary:       Effort: Pulmonary effort is normal. No respiratory distress.   Abdominal:      General: There is no distension.      Palpations: Abdomen is soft.      Tenderness: There is abdominal tenderness. There is no guarding.      Comments: Umbilical/ventral hernia noted    Neurological:      Mental Status: She is alert and oriented to person, place, and time.   Psychiatric:         Mood and Affect: Mood normal.         Behavior: Behavior normal.         Results Review:     CBC    Results from last 7 days   Lab Units 04/08/24  0042 04/07/24  0144 04/06/24  0151 04/05/24  0346 04/04/24  0538 04/03/24  1631   WBC 10*3/mm3 15.72* 16.41* 21.25* 31.46* 33.58* 32.90*   HEMOGLOBIN g/dL 9.6* 9.9* 8.7* 9.3* 11.0* 11.6*   PLATELETS 10*3/mm3 452* 450 344 317 358 364     BMP   Results from last 7 days   Lab Units 04/08/24  0042 04/07/24  1620 04/07/24  0144 04/06/24  1328 04/06/24  0151 04/05/24  0346 04/04/24  0538 04/03/24  1631   SODIUM mmol/L 139  --  140  --  138 133* 134* 133*   POTASSIUM mmol/L 3.9 3.7 3.6 4.0 3.2* 3.7 3.8 3.1*   CHLORIDE mmol/L 102  --  101  --  102 96* 93* 90*   CO2 mmol/L 30.0*  --  31.0*  --  23.0 25.0 30.0* 30.0*   BUN mg/dL 5*  --  8  --  15 25* 19 15   CREATININE mg/dL 0.51*  --  0.53*  --  0.52* 0.89 1.05* 1.06*   GLUCOSE mg/dL 98  --  94  --  72 83 88 131*     Radiology(recent) No radiology results for the last day    I reviewed the patient's new clinical results.    Assessment & Plan       Colitis with abscess    Essential hypertension    Hypothyroidism    Hypokalemia    Elevated LFTs    Acute renal insufficiency    Leukocytosis      59 y.o. female admitted 4/3/2024 with severe diverticulitis     GI soft diet as tolerated  Antiemetics and pain medication as needed  Continue IV antibiotic per ID, will appreciate recommendations  Trend WBC count, 15.72 (16.41) this morning  Monitor replace electrolytes as needed  Continue to encourage ambulation, out of bed to chair  Appears to be improving clinically.   Anticipate discharge in next few days.        This note was created using Dragon Voice Recognition software.    HOWARD Castro  04/08/24  09:13 EDT

## 2024-04-08 NOTE — PROGRESS NOTES
[facility] MEDICINE SERVICE  DAILY PROGRESS NOTE    NAME: Kirby Gunter  : 1965  MRN: [id number]      LOS: 5 days     PROVIDER OF SERVICE: Devin Ortega MD    Chief Complaint: Colitis with abscess    Subjective:     Interval History:  History taken from: patient chart  Abdominal pain improved tolerating current diet    Review of Systems:   Review of Systems  All negative except as above  Objective:     Vital Signs  Temp:  [97.7 °F (36.5 °C)-98.2 °F (36.8 °C)] 98.2 °F (36.8 °C)  Heart Rate:  [71-89] 86  Resp:  [14-17] 17  BP: (107-125)/(65-84) 125/81   Body mass index is 26.85 kg/m².    Physical Exam  Physical Exam  AOx3 NAD  RRR S1-S2 normal  Lungs CTA  Abdomen soft nontender nondistended  Scheduled Meds   amLODIPine, 10 mg, Oral, Daily  levothyroxine, 88 mcg, Oral, Q AM  lisinopril, 20 mg, Oral, Q24H  micafungin (MYCAMINE) IV, 100 mg, Intravenous, Q24H  piperacillin-tazobactam, 3.375 g, Intravenous, Q8H  vitamin D, 50,000 Units, Oral, Weekly       PRN Meds     albuterol    HYDROcodone-acetaminophen    Morphine    ondansetron    Potassium Replacement - Follow Nurse / BPA Driven Protocol    [COMPLETED] Insert Peripheral IV **AND** sodium chloride   Infusions         Diagnostic Data    Results from last 7 days   Lab Units 24  0042 24  1620 24  0144   WBC 10*3/mm3 15.72*  --  16.41*   HEMOGLOBIN g/dL 9.6*  --  9.9*   HEMATOCRIT % 30.8*  --  32.0*   PLATELETS 10*3/mm3 452*  --  450   GLUCOSE mg/dL 98  --  94   CREATININE mg/dL 0.51*  --  0.53*   BUN mg/dL 5*  --  8   SODIUM mmol/L 139  --  140   POTASSIUM mmol/L 3.9   < > 3.6   AST (SGOT) U/L  --   --  21   ALT (SGPT) U/L  --   --  26   ALK PHOS U/L  --   --  86   BILIRUBIN mg/dL  --   --  0.5   ANION GAP mmol/L 7.0  --  8.0    < > = values in this interval not displayed.       No radiology results for the last day      I reviewed the patient's new clinical results.  I reviewed the patient's new imaging results and agree with the  interpretation.    Assessment/Plan:     Active and Resolved Problems  Active Hospital Problems    Diagnosis  POA    **Colitis with abscess [K52.9, K63.0]  Yes    Hypokalemia [E87.6]  Yes    Elevated LFTs [R79.89]  Yes    Acute renal insufficiency [N28.9]  Yes    Leukocytosis [D72.829]  Yes    Hypothyroidism [E03.9]  Yes    Essential hypertension [I10]  Yes      Resolved Hospital Problems   No resolved problems to display.       #Acute sigmoid diverticulitis multiple small abscesses  CT abdomen pelvis reviewed  Seen by surgery no surgical intervention planned.  Outpatient follow-up  ID following on IV Zosyn and IV micafungin.  Noted plan for 2 weeks of IV antibiotics  Monitor leukocytosis  Continue current diet  Continue current pain control          #Hypertension  BP stable  Continue amlodipine 10 and lisinopril 20     #Hypothyroidism  Continue home dose of levothyroxine 88 mcg     #Mild HERSON-resolved       #Electrolyte normalities  Keep magnesium more than 2 and potassium more than 4       DVT prophylaxis:  Mechanical DVT prophylaxis orders are present.         Code status is   Code Status and Medical Interventions:   Ordered at: 04/03/24 2211     Code Status (Patient has no pulse and is not breathing):    CPR (Attempt to Resuscitate)     Medical Interventions (Patient has pulse or is breathing):    Full Support       Plan for disposition: Next 24 hours pending infectious disease clearance    Time: 30 minutes    Signature: Electronically signed by Devin Ortega MD, 04/08/24, 13:00 EDT.  [facility] Hospitalist Team

## 2024-04-08 NOTE — PLAN OF CARE
Goal Outcome Evaluation:      Pt alert and oriented. Pt complaints of pain managed with PO medication,see MAR. Pt receiving IV antibiotic therapy. Pt up ad saravanan. VS stable. Plan of care ongoing.

## 2024-04-08 NOTE — DISCHARGE PLACEMENT REQUEST
"Kirby Simmons (59 y.o. Female)       Date of Birth   1965    Social Security Number       Address   [address] [place] [address] [place] [facility]    Home Phone   [phone]    MRN   [id number]       [place]   Patient Refused    Marital Status   Single                            Admission Date   4/3/24    Admission Type   Emergency    Admitting Provider   Dale Eubanks MD    Attending Provider   Devin Ortega MD    Department, Room/Bed   [facility] SURGICAL INPATIENT, 4115/1       Discharge Date       Discharge Disposition       Discharge Destination                                 Attending Provider: Devin Ortega MD    Allergies: No Known Allergies    Isolation: None   Infection: None   Code Status: CPR    Ht: 157.5 cm (62\")   Wt: 66.6 kg (146 lb 13.2 oz)    Admission Cmt: None   Principal Problem: Colitis with abscess [K52.9,K63.0]                   Active Insurance as of 4/3/2024       Primary Coverage       Payor Plan Insurance Group Employer/Plan Group    [employer] [place] [id number]       Payor Plan Address Payor Plan Phone Number Payor Plan Fax Number Effective Dates    MAIL STOP:   4/1/2017 - None Entered    [address] [address]       [facility] [address]         Subscriber Name Subscriber Birth Date Member ID       KIRBY SIMMONS 1965 [id number]                     Emergency Contacts        (Rel.) Home Phone Work Phone Mobile Phone    MARTÍN RANDHAWA (Significant Other) [phone] -- --    NEHALARCELIA (Sister) -- -- --                "

## 2024-04-08 NOTE — PLAN OF CARE
Goal Outcome Evaluation:        Problem: Adult Inpatient Plan of Care  Goal: Plan of Care Review  Outcome: Ongoing, Progressing  Goal: Patient-Specific Goal (Individualized)  Outcome: Ongoing, Progressing  Goal: Absence of Hospital-Acquired Illness or Injury  Outcome: Ongoing, Progressing  Intervention: Identify and Manage Fall Risk  Recent Flowsheet Documentation  Taken 4/8/2024 1433 by Emilia Tan LPN  Safety Promotion/Fall Prevention:   safety round/check completed   room organization consistent   nonskid shoes/slippers when out of bed   clutter free environment maintained   activity supervised  Taken 4/8/2024 1246 by Emilia Tan LPN  Safety Promotion/Fall Prevention:   safety round/check completed   room organization consistent   nonskid shoes/slippers when out of bed   clutter free environment maintained   activity supervised  Taken 4/8/2024 1000 by Emilia Tan LPN  Safety Promotion/Fall Prevention:   safety round/check completed   room organization consistent   nonskid shoes/slippers when out of bed   clutter free environment maintained   activity supervised  Taken 4/8/2024 0830 by Emilia Tan LPN  Safety Promotion/Fall Prevention:   safety round/check completed   room organization consistent   nonskid shoes/slippers when out of bed   clutter free environment maintained   activity supervised  Intervention: Prevent Skin Injury  Recent Flowsheet Documentation  Taken 4/8/2024 1433 by Emilia Tan LPN  Body Position: position changed independently  Taken 4/8/2024 0830 by Emilia Tan LPN  Body Position: position changed independently  Skin Protection:   adhesive use limited   tubing/devices free from skin contact   incontinence pads utilized  Intervention: Prevent and Manage VTE (Venous Thromboembolism) Risk  Recent Flowsheet Documentation  Taken 4/8/2024 1433 by Emilia Tan LPN  Activity Management: ambulated in room  Taken 4/8/2024 0830 by Emilia Tan LPN  Activity Management: ambulated to  bathroom  VTE Prevention/Management: (pt ambulating around room)   bilateral   sequential compression devices off   other (see comments)  Range of Motion: active ROM (range of motion) encouraged  Intervention: Prevent Infection  Recent Flowsheet Documentation  Taken 4/8/2024 0830 by Emilia Tan LPN  Infection Prevention:   visitors restricted/screened   single patient room provided   rest/sleep promoted   personal protective equipment utilized   hand hygiene promoted  Goal: Optimal Comfort and Wellbeing  Outcome: Ongoing, Progressing  Intervention: Monitor Pain and Promote Comfort  Recent Flowsheet Documentation  Taken 4/8/2024 1246 by Emilia Tan LPN  Pain Management Interventions:   relaxation techniques promoted   quiet environment facilitated   care clustered  Taken 4/8/2024 1204 by Emilia Tan LPN  Pain Management Interventions:   care clustered   quiet environment facilitated   relaxation techniques promoted  Taken 4/8/2024 1134 by Emilia Tan LPN  Pain Management Interventions:   relaxation techniques promoted   care clustered  Taken 4/8/2024 0830 by Emilia Tan LPN  Pain Management Interventions:   care clustered   pillow support provided   position adjusted   relaxation techniques promoted  Intervention: Provide Person-Centered Care  Recent Flowsheet Documentation  Taken 4/8/2024 0830 by Emilia Tan LPN  Trust Relationship/Rapport:   care explained   thoughts/feelings acknowledged   questions answered  Goal: Readiness for Transition of Care  Outcome: Ongoing, Progressing  Goal: Plan of Care Review  Outcome: Ongoing, Progressing  Goal: Patient-Specific Goal (Individualized)  Outcome: Ongoing, Progressing  Goal: Absence of Hospital-Acquired Illness or Injury  Outcome: Ongoing, Progressing  Intervention: Identify and Manage Fall Risk  Recent Flowsheet Documentation  Taken 4/8/2024 1433 by Emilia Tan LPN  Safety Promotion/Fall Prevention:   safety round/check completed   room organization  consistent   nonskid shoes/slippers when out of bed   clutter free environment maintained   activity supervised  Taken 4/8/2024 1246 by Emilia Tan LPN  Safety Promotion/Fall Prevention:   safety round/check completed   room organization consistent   nonskid shoes/slippers when out of bed   clutter free environment maintained   activity supervised  Taken 4/8/2024 1000 by Emilia Tan LPN  Safety Promotion/Fall Prevention:   safety round/check completed   room organization consistent   nonskid shoes/slippers when out of bed   clutter free environment maintained   activity supervised  Taken 4/8/2024 0830 by Emilia Tan LPN  Safety Promotion/Fall Prevention:   safety round/check completed   room organization consistent   nonskid shoes/slippers when out of bed   clutter free environment maintained   activity supervised  Intervention: Prevent Skin Injury  Recent Flowsheet Documentation  Taken 4/8/2024 1433 by Emilia Tan LPN  Body Position: position changed independently  Taken 4/8/2024 0830 by Emilia Tan LPN  Body Position: position changed independently  Skin Protection:   adhesive use limited   tubing/devices free from skin contact   incontinence pads utilized  Intervention: Prevent and Manage VTE (Venous Thromboembolism) Risk  Recent Flowsheet Documentation  Taken 4/8/2024 1433 by Emilia Tan LPN  Activity Management: ambulated in room  Taken 4/8/2024 0830 by Emilia Tan LPN  Activity Management: ambulated to bathroom  VTE Prevention/Management: (pt ambulating around room)   bilateral   sequential compression devices off   other (see comments)  Range of Motion: active ROM (range of motion) encouraged  Intervention: Prevent Infection  Recent Flowsheet Documentation  Taken 4/8/2024 0830 by Emilia Tan LPN  Infection Prevention:   visitors restricted/screened   single patient room provided   rest/sleep promoted   personal protective equipment utilized   hand hygiene promoted  Goal: Optimal Comfort and  Wellbeing  Outcome: Ongoing, Progressing  Intervention: Monitor Pain and Promote Comfort  Recent Flowsheet Documentation  Taken 4/8/2024 1246 by Emilia Tan LPN  Pain Management Interventions:   relaxation techniques promoted   quiet environment facilitated   care clustered  Taken 4/8/2024 1204 by Emilia Tan LPN  Pain Management Interventions:   care clustered   quiet environment facilitated   relaxation techniques promoted  Taken 4/8/2024 1134 by Emilia Tan LPN  Pain Management Interventions:   relaxation techniques promoted   care clustered  Taken 4/8/2024 0830 by Emilia Tan LPN  Pain Management Interventions:   care clustered   pillow support provided   position adjusted   relaxation techniques promoted  Intervention: Provide Person-Centered Care  Recent Flowsheet Documentation  Taken 4/8/2024 0830 by Emilia Tan LPN  Trust Relationship/Rapport:   care explained   thoughts/feelings acknowledged   questions answered  Goal: Readiness for Transition of Care  Outcome: Ongoing, Progressing     Problem: Pain Acute  Goal: Acceptable Pain Control and Functional Ability  Outcome: Ongoing, Progressing  Intervention: Prevent or Manage Pain  Recent Flowsheet Documentation  Taken 4/8/2024 1433 by Emilia Tan LPN  Medication Review/Management: medications reviewed  Taken 4/8/2024 1246 by Emilia Tan LPN  Medication Review/Management: medications reviewed  Taken 4/8/2024 1000 by Emilia Tan LPN  Medication Review/Management: medications reviewed  Taken 4/8/2024 0830 by Emilia Tan LPN  Sensory Stimulation Regulation: care clustered  Bowel Elimination Promotion: adequate fluid intake promoted  Medication Review/Management: medications reviewed  Intervention: Develop Pain Management Plan  Recent Flowsheet Documentation  Taken 4/8/2024 1246 by Emilia Tan LPN  Pain Management Interventions:   relaxation techniques promoted   quiet environment facilitated   care clustered  Taken 4/8/2024 1204 by Emilia Tan  LPN  Pain Management Interventions:   care clustered   quiet environment facilitated   relaxation techniques promoted  Taken 4/8/2024 1134 by Emilia Tan LPN  Pain Management Interventions:   relaxation techniques promoted   care clustered  Taken 4/8/2024 0830 by Emilia Tan LPN  Pain Management Interventions:   care clustered   pillow support provided   position adjusted   relaxation techniques promoted  Intervention: Optimize Psychosocial Wellbeing  Recent Flowsheet Documentation  Taken 4/8/2024 0830 by Emilia Tan LPN  Diversional Activities:   television   smartphone     Problem: Adjustment to Illness (Sepsis/Septic Shock)  Goal: Optimal Coping  Outcome: Ongoing, Progressing  Intervention: Optimize Psychosocial Adjustment to Illness  Recent Flowsheet Documentation  Taken 4/8/2024 0830 by Emilia Tan LPN  Family/Support System Care: self-care encouraged     Problem: Bleeding (Sepsis/Septic Shock)  Goal: Absence of Bleeding  Outcome: Ongoing, Progressing     Problem: Glycemic Control Impaired (Sepsis/Septic Shock)  Goal: Blood Glucose Level Within Desired Range  Outcome: Ongoing, Progressing     Problem: Infection Progression (Sepsis/Septic Shock)  Goal: Absence of Infection Signs and Symptoms  Outcome: Ongoing, Progressing  Intervention: Initiate Sepsis Management  Recent Flowsheet Documentation  Taken 4/8/2024 0830 by Emilia Tan LPN  Infection Prevention:   visitors restricted/screened   single patient room provided   rest/sleep promoted   personal protective equipment utilized   hand hygiene promoted  Intervention: Promote Recovery  Recent Flowsheet Documentation  Taken 4/8/2024 1433 by Emilia Tan LPN  Activity Management: ambulated in room  Taken 4/8/2024 0830 by Emilia Tan LPN  Activity Management: ambulated to bathroom     Problem: Nutrition Impaired (Sepsis/Septic Shock)  Goal: Optimal Nutrition Intake  Outcome: Ongoing, Progressing

## 2024-04-08 NOTE — CASE MANAGEMENT/SOCIAL WORK
Continued Stay Note   Ar     Patient Name: Kirby Gunter  MRN: [id number]  Today's Date: 4/8/2024    Admit Date: 4/3/2024    Plan: Referral to [facility] pending acceptance.  Pt plans to drive self home   Discharge Plan       Row Name 04/08/24 1718       Plan    Plan Referral to [facility] pending acceptance.  Pt plans to drive self home    Plan Comments DC barriers:  CT abd pending , acceptance with infusion company, PICC/Midline                      Expected Discharge Date and Time       Expected Discharge Date Expected Discharge Time    Apr 9, 2024             Aundrea Zhou RN

## 2024-04-09 ENCOUNTER — NURSE TRIAGE (OUTPATIENT)
Dept: CALL CENTER | Facility: HOSPITAL | Age: 59
End: 2024-04-09
Payer: MEDICAID

## 2024-04-09 ENCOUNTER — READMISSION MANAGEMENT (OUTPATIENT)
Dept: CALL CENTER | Facility: HOSPITAL | Age: 59
End: 2024-04-09
Payer: MEDICAID

## 2024-04-09 ENCOUNTER — APPOINTMENT (OUTPATIENT)
Dept: CARDIOLOGY | Facility: HOSPITAL | Age: 59
End: 2024-04-09
Payer: MEDICAID

## 2024-04-09 ENCOUNTER — APPOINTMENT (OUTPATIENT)
Dept: CT IMAGING | Facility: HOSPITAL | Age: 59
End: 2024-04-09
Payer: MEDICAID

## 2024-04-09 VITALS
SYSTOLIC BLOOD PRESSURE: 134 MMHG | HEIGHT: 62 IN | OXYGEN SATURATION: 96 % | BODY MASS INDEX: 27.02 KG/M2 | TEMPERATURE: 98.3 F | RESPIRATION RATE: 24 BRPM | HEART RATE: 96 BPM | WEIGHT: 146.83 LBS | DIASTOLIC BLOOD PRESSURE: 84 MMHG

## 2024-04-09 PROBLEM — K57.92 DIVERTICULITIS: Status: ACTIVE | Noted: 2024-04-09

## 2024-04-09 LAB
ANION GAP SERPL CALCULATED.3IONS-SCNC: 9 MMOL/L (ref 5–15)
BACTERIA SPEC AEROBE CULT: NORMAL
BACTERIA SPEC AEROBE CULT: NORMAL
BASOPHILS # BLD AUTO: 0.09 10*3/MM3 (ref 0–0.2)
BASOPHILS NFR BLD AUTO: 0.5 % (ref 0–1.5)
BH CV LOWER VASCULAR LEFT COMMON FEMORAL AUGMENT: NORMAL
BH CV LOWER VASCULAR LEFT COMMON FEMORAL COMPETENT: NORMAL
BH CV LOWER VASCULAR LEFT COMMON FEMORAL COMPRESS: NORMAL
BH CV LOWER VASCULAR LEFT COMMON FEMORAL PHASIC: NORMAL
BH CV LOWER VASCULAR LEFT COMMON FEMORAL SPONT: NORMAL
BH CV LOWER VASCULAR LEFT DISTAL FEMORAL COMPRESS: NORMAL
BH CV LOWER VASCULAR LEFT GASTRONEMIUS COMPRESS: NORMAL
BH CV LOWER VASCULAR LEFT GREATER SAPH AK COMPRESS: NORMAL
BH CV LOWER VASCULAR LEFT GREATER SAPH BK COMPRESS: NORMAL
BH CV LOWER VASCULAR LEFT LESSER SAPH COMPRESS: NORMAL
BH CV LOWER VASCULAR LEFT MID FEMORAL AUGMENT: NORMAL
BH CV LOWER VASCULAR LEFT MID FEMORAL COMPETENT: NORMAL
BH CV LOWER VASCULAR LEFT MID FEMORAL COMPRESS: NORMAL
BH CV LOWER VASCULAR LEFT MID FEMORAL PHASIC: NORMAL
BH CV LOWER VASCULAR LEFT MID FEMORAL SPONT: NORMAL
BH CV LOWER VASCULAR LEFT PERONEAL COMPRESS: NORMAL
BH CV LOWER VASCULAR LEFT POPLITEAL AUGMENT: NORMAL
BH CV LOWER VASCULAR LEFT POPLITEAL COMPETENT: NORMAL
BH CV LOWER VASCULAR LEFT POPLITEAL COMPRESS: NORMAL
BH CV LOWER VASCULAR LEFT POPLITEAL PHASIC: NORMAL
BH CV LOWER VASCULAR LEFT POPLITEAL SPONT: NORMAL
BH CV LOWER VASCULAR LEFT POSTERIOR TIBIAL COMPRESS: NORMAL
BH CV LOWER VASCULAR LEFT PROXIMAL FEMORAL COMPRESS: NORMAL
BH CV LOWER VASCULAR LEFT SAPHENOFEMORAL JUNCTION COMPRESS: NORMAL
BH CV LOWER VASCULAR RIGHT COMMON FEMORAL AUGMENT: NORMAL
BH CV LOWER VASCULAR RIGHT COMMON FEMORAL COMPETENT: NORMAL
BH CV LOWER VASCULAR RIGHT COMMON FEMORAL COMPRESS: NORMAL
BH CV LOWER VASCULAR RIGHT COMMON FEMORAL PHASIC: NORMAL
BH CV LOWER VASCULAR RIGHT COMMON FEMORAL SPONT: NORMAL
BUN SERPL-MCNC: 6 MG/DL (ref 8–23)
BUN/CREAT SERPL: 10.9 (ref 7–25)
CALCIUM SPEC-SCNC: 8.9 MG/DL (ref 8.6–10.5)
CHLORIDE SERPL-SCNC: 99 MMOL/L (ref 98–107)
CO2 SERPL-SCNC: 32 MMOL/L (ref 22–29)
CREAT SERPL-MCNC: 0.55 MG/DL (ref 0.57–1)
DEPRECATED RDW RBC AUTO: 45.2 FL (ref 37–54)
EGFRCR SERPLBLD CKD-EPI 2021: 104.4 ML/MIN/1.73
EOSINOPHIL # BLD AUTO: 0.5 10*3/MM3 (ref 0–0.4)
EOSINOPHIL NFR BLD AUTO: 3.1 % (ref 0.3–6.2)
ERYTHROCYTE [DISTWIDTH] IN BLOOD BY AUTOMATED COUNT: 13.1 % (ref 12.3–15.4)
GLUCOSE SERPL-MCNC: 98 MG/DL (ref 65–99)
HCT VFR BLD AUTO: 32.9 % (ref 34–46.6)
HGB BLD-MCNC: 10.1 G/DL (ref 12–15.9)
IMM GRANULOCYTES # BLD AUTO: 0.34 10*3/MM3 (ref 0–0.05)
IMM GRANULOCYTES NFR BLD AUTO: 2.1 % (ref 0–0.5)
LYMPHOCYTES # BLD AUTO: 3.09 10*3/MM3 (ref 0.7–3.1)
LYMPHOCYTES NFR BLD AUTO: 18.9 % (ref 19.6–45.3)
MCH RBC QN AUTO: 29.2 PG (ref 26.6–33)
MCHC RBC AUTO-ENTMCNC: 30.7 G/DL (ref 31.5–35.7)
MCV RBC AUTO: 95.1 FL (ref 79–97)
MONOCYTES # BLD AUTO: 1.09 10*3/MM3 (ref 0.1–0.9)
MONOCYTES NFR BLD AUTO: 6.7 % (ref 5–12)
NEUTROPHILS NFR BLD AUTO: 11.26 10*3/MM3 (ref 1.7–7)
NEUTROPHILS NFR BLD AUTO: 68.7 % (ref 42.7–76)
NRBC BLD AUTO-RTO: 0 /100 WBC (ref 0–0.2)
PLATELET # BLD AUTO: 556 10*3/MM3 (ref 140–450)
PMV BLD AUTO: 9.6 FL (ref 6–12)
POTASSIUM SERPL-SCNC: 3.8 MMOL/L (ref 3.5–5.2)
RBC # BLD AUTO: 3.46 10*6/MM3 (ref 3.77–5.28)
SODIUM SERPL-SCNC: 140 MMOL/L (ref 136–145)
WBC NRBC COR # BLD AUTO: 16.37 10*3/MM3 (ref 3.4–10.8)

## 2024-04-09 PROCEDURE — 99232 SBSQ HOSP IP/OBS MODERATE 35: CPT | Performed by: STUDENT IN AN ORGANIZED HEALTH CARE EDUCATION/TRAINING PROGRAM

## 2024-04-09 PROCEDURE — 36410 VNPNXR 3YR/> PHY/QHP DX/THER: CPT

## 2024-04-09 PROCEDURE — 80048 BASIC METABOLIC PNL TOTAL CA: CPT | Performed by: NURSE PRACTITIONER

## 2024-04-09 PROCEDURE — 25510000001 IOPAMIDOL PER 1 ML: Performed by: HOSPITALIST

## 2024-04-09 PROCEDURE — 74177 CT ABD & PELVIS W/CONTRAST: CPT

## 2024-04-09 PROCEDURE — C1751 CATH, INF, PER/CENT/MIDLINE: HCPCS

## 2024-04-09 PROCEDURE — 93971 EXTREMITY STUDY: CPT | Performed by: SURGERY

## 2024-04-09 PROCEDURE — 85025 COMPLETE CBC W/AUTO DIFF WBC: CPT | Performed by: NURSE PRACTITIONER

## 2024-04-09 PROCEDURE — 25010000002 PIPERACILLIN SOD-TAZOBACTAM PER 1 G: Performed by: NURSE PRACTITIONER

## 2024-04-09 PROCEDURE — 25010000002 HEPARIN (PORCINE) PER 1000 UNITS: Performed by: HOSPITALIST

## 2024-04-09 PROCEDURE — 93971 EXTREMITY STUDY: CPT

## 2024-04-09 RX ORDER — HYDROCODONE BITARTRATE AND ACETAMINOPHEN 10; 325 MG/1; MG/1
1 TABLET ORAL EVERY 6 HOURS PRN
Qty: 12 TABLET | Refills: 0 | Status: SHIPPED | OUTPATIENT
Start: 2024-04-09 | End: 2024-04-12

## 2024-04-09 RX ORDER — SODIUM CHLORIDE 0.9 % (FLUSH) 0.9 %
10 SYRINGE (ML) INJECTION AS NEEDED
OUTPATIENT
Start: 2024-04-09

## 2024-04-09 RX ORDER — SODIUM CHLORIDE 0.9 % (FLUSH) 0.9 %
20 SYRINGE (ML) INJECTION AS NEEDED
OUTPATIENT
Start: 2024-04-09

## 2024-04-09 RX ORDER — HEPARIN SODIUM 5000 [USP'U]/ML
5000 INJECTION, SOLUTION INTRAVENOUS; SUBCUTANEOUS EVERY 8 HOURS SCHEDULED
Status: DISCONTINUED | OUTPATIENT
Start: 2024-04-09 | End: 2024-04-09 | Stop reason: HOSPADM

## 2024-04-09 RX ORDER — SODIUM CHLORIDE 0.9 % (FLUSH) 0.9 %
20 SYRINGE (ML) INJECTION AS NEEDED
Status: DISCONTINUED | OUTPATIENT
Start: 2024-04-09 | End: 2024-04-09 | Stop reason: HOSPADM

## 2024-04-09 RX ORDER — SODIUM CHLORIDE 9 MG/ML
40 INJECTION, SOLUTION INTRAVENOUS AS NEEDED
Status: DISCONTINUED | OUTPATIENT
Start: 2024-04-09 | End: 2024-04-09 | Stop reason: HOSPADM

## 2024-04-09 RX ORDER — SODIUM CHLORIDE 0.9 % (FLUSH) 0.9 %
10 SYRINGE (ML) INJECTION EVERY 12 HOURS SCHEDULED
Status: DISCONTINUED | OUTPATIENT
Start: 2024-04-09 | End: 2024-04-09 | Stop reason: HOSPADM

## 2024-04-09 RX ORDER — FLUCONAZOLE 200 MG/1
400 TABLET ORAL EVERY 24 HOURS
Qty: 20 TABLET | Refills: 0 | Status: SHIPPED | OUTPATIENT
Start: 2024-04-09 | End: 2024-04-19

## 2024-04-09 RX ORDER — SODIUM CHLORIDE 0.9 % (FLUSH) 0.9 %
10 SYRINGE (ML) INJECTION AS NEEDED
Status: DISCONTINUED | OUTPATIENT
Start: 2024-04-09 | End: 2024-04-09 | Stop reason: HOSPADM

## 2024-04-09 RX ORDER — POLYETHYLENE GLYCOL 3350 17 G/17G
17 POWDER, FOR SOLUTION ORAL DAILY
Qty: 238 G | Refills: 0 | Status: SHIPPED | OUTPATIENT
Start: 2024-04-09 | End: 2024-04-23

## 2024-04-09 RX ORDER — SODIUM CHLORIDE 0.9 % (FLUSH) 0.9 %
10 SYRINGE (ML) INJECTION EVERY 12 HOURS SCHEDULED
OUTPATIENT
Start: 2024-04-09

## 2024-04-09 RX ORDER — LIDOCAINE HYDROCHLORIDE 10 MG/ML
20 INJECTION, SOLUTION INFILTRATION; PERINEURAL ONCE
Status: DISCONTINUED | OUTPATIENT
Start: 2024-04-09 | End: 2024-04-09 | Stop reason: HOSPADM

## 2024-04-09 RX ORDER — POLYETHYLENE GLYCOL 3350 17 G/17G
17 POWDER, FOR SOLUTION ORAL DAILY
Qty: 14 PACKET | Refills: 0 | Status: SHIPPED | OUTPATIENT
Start: 2024-04-09 | End: 2024-04-09

## 2024-04-09 RX ORDER — HYDROCODONE BITARTRATE AND ACETAMINOPHEN 10; 325 MG/1; MG/1
1 TABLET ORAL EVERY 6 HOURS PRN
Qty: 12 TABLET | Refills: 0 | Status: SHIPPED | OUTPATIENT
Start: 2024-04-09 | End: 2024-04-09

## 2024-04-09 RX ORDER — FLUCONAZOLE 200 MG/1
400 TABLET ORAL EVERY 24 HOURS
Qty: 20 TABLET | Refills: 0 | Status: SHIPPED | OUTPATIENT
Start: 2024-04-09 | End: 2024-04-09

## 2024-04-09 RX ADMIN — LISINOPRIL 20 MG: 20 TABLET ORAL at 09:39

## 2024-04-09 RX ADMIN — HEPARIN SODIUM 5000 UNITS: 5000 INJECTION INTRAVENOUS; SUBCUTANEOUS at 14:57

## 2024-04-09 RX ADMIN — PIPERACILLIN AND TAZOBACTAM 3.38 G: 3; .375 INJECTION, POWDER, FOR SOLUTION INTRAVENOUS at 14:57

## 2024-04-09 RX ADMIN — IOPAMIDOL 100 ML: 755 INJECTION, SOLUTION INTRAVENOUS at 09:03

## 2024-04-09 RX ADMIN — HYDROCODONE BITARTRATE AND ACETAMINOPHEN 1 TABLET: 10; 325 TABLET ORAL at 06:25

## 2024-04-09 RX ADMIN — FLUCONAZOLE 400 MG: 150 TABLET ORAL at 17:09

## 2024-04-09 RX ADMIN — PIPERACILLIN AND TAZOBACTAM 3.38 G: 3; .375 INJECTION, POWDER, FOR SOLUTION INTRAVENOUS at 06:14

## 2024-04-09 RX ADMIN — AMLODIPINE BESYLATE 10 MG: 5 TABLET ORAL at 09:39

## 2024-04-09 RX ADMIN — LEVOTHYROXINE SODIUM 88 MCG: 88 TABLET ORAL at 06:14

## 2024-04-09 NOTE — PLAN OF CARE
Goal Outcome Evaluation:         Patient doing well, up ad saravanan in room, tolerating diet at this time. Patient to dc home with midline, education provided

## 2024-04-09 NOTE — PROGRESS NOTES
Infectious Diseases Progress Note      LOS: 6 days   Patient Care Team:  Chava López MD as PCP - General  Chava López MD as PCP - Family Medicine    Chief Complaint: Abdominal pain    Subjective       Patient had no fever during the last 24 hours.  She continued to have abdominal pain but symptoms had improved significantly.  She is hemodynamically stable.  Improvement to abdominal pain today.  Has been able to tolerate some solid food.  Having some left leg swelling today    Review of Systems:   Review of Systems   Constitutional: Negative.    HENT: Negative.     Eyes: Negative.    Respiratory: Negative.     Cardiovascular: Negative.    Gastrointestinal:  Positive for abdominal pain.   Genitourinary: Negative.    Musculoskeletal: Negative.    Skin: Negative.    Neurological: Negative.    Hematological: Negative.    Psychiatric/Behavioral: Negative.          Objective     Vital Signs  Temp:  [97.4 °F (36.3 °C)-98.4 °F (36.9 °C)] 98.3 °F (36.8 °C)  Heart Rate:  [85-96] 96  Resp:  [18-24] 24  BP: (132-134)/(80-91) 134/84    Physical Exam:  Physical Exam  Vitals and nursing note reviewed.   Constitutional:       Appearance: She is well-developed.   HENT:      Head: Normocephalic and atraumatic.   Eyes:      Pupils: Pupils are equal, round, and reactive to light.   Cardiovascular:      Rate and Rhythm: Normal rate and regular rhythm.      Heart sounds: Normal heart sounds.   Pulmonary:      Effort: Pulmonary effort is normal. No respiratory distress.      Breath sounds: Normal breath sounds. No wheezing or rales.   Abdominal:      General: Bowel sounds are normal. There is no distension.      Palpations: Abdomen is soft. There is no mass.      Tenderness: There is abdominal tenderness. There is no guarding or rebound.   Musculoskeletal:         General: Normal range of motion.      Cervical back: Normal range of motion and neck supple.      Left lower leg: Edema present.      Comments: Mild  left leg edema   Skin:     General: Skin is warm.      Findings: No erythema or rash.   Neurological:      Mental Status: She is alert and oriented to person, place, and time.      Cranial Nerves: No cranial nerve deficit.          Results Review:    I have reviewed all clinical data, test, lab, and imaging results.     Radiology  Duplex Venous Lower Extremity - Left CAR    Result Date: 4/9/2024    Normal left lower extremity venous duplex scan.     CT Abdomen Pelvis With Contrast    Result Date: 4/9/2024  CT ABDOMEN PELVIS W CONTRAST Date of Exam: 4/9/2024 9:00 AM EDT Indication: Progression/resolution of diverticulitis. Comparison: 4/5/2024 Technique: Axial CT images were obtained of the abdomen and pelvis following the uneventful intravenous administration of iodinated contrast. Sagittal and coronal reconstructions were performed.  Automated exposure control and iterative reconstruction methods were used. Findings: Liver: The liver is unremarkable in morphology. No focal liver lesion is seen. No biliary dilation is seen. Gallbladder: Unremarkable. Pancreas: Unremarkable. Spleen: Unremarkable. Adrenal glands: Unremarkable. Genitourinary tract: Kidneys are unremarkable. No hydronephrosis is seen. The visualized hollow viscera demonstrate no focal lesion. The urinary bladder and pelvic organs demonstrate no acute abnormality. Gastrointestinal tract: Colonic diverticulosis is present. There is persistent wall thickening and pericolonic fat stranding about the proximal/mid sigmoid colon, similar since 4/5/2024. 3.6 x 1.4 cm peripherally enhancing fluid collection within the right pelvis (series 3, image 108) appears similar since the prior study. 2.5 cm fluid/air density collection is seen within the central lower abdomen on series 3, image 81. These are concerning for small abscesses. No evidence of bowel obstruction. Appendix: No findings to suggest acute appendicitis. Other findings: Scattered borderline prominent  retroperitoneal lymph nodes, possibly reactive.. Vascular calcifications are present. The IVC is unremarkable. Bones and soft tissues: No acute or suspicious osseous or soft tissue lesion is identified. Scattered superficial soft tissue edema, nonspecific. There is an umbilical hernia defect which contains fat and mild fluid. Lung bases: The visualized lung bases are clear.     Impression: 1.Sigmoid diverticulitis with small abscesses in the central lower abdomen and within the right pelvis, further described above. Inflammatory changes about the sigmoid colon appears similar since the previous study. 2.Please see above for additional details. Electronically Signed: Eben Hoover MD  4/9/2024 9:19 AM EDT  Workstation ID: [id number]     Cardiology    Laboratory    Results from last 7 days   Lab Units 04/09/24  0106 04/08/24  0042 04/07/24  0144 04/06/24  0151 04/05/24  0346 04/04/24  0538 04/03/24  1631   WBC 10*3/mm3 16.37* 15.72* 16.41* 21.25* 31.46* 33.58* 32.90*   HEMOGLOBIN g/dL 10.1* 9.6* 9.9* 8.7* 9.3* 11.0* 11.6*   HEMATOCRIT % 32.9* 30.8* 32.0* 28.0* 29.5* 34.2 35.6   PLATELETS 10*3/mm3 556* 452* 450 344 317 358 364     Results from last 7 days   Lab Units 04/09/24  0106 04/08/24  0042 04/07/24  1620 04/07/24  0144 04/06/24  1328 04/06/24  0151 04/05/24  0346 04/04/24  0538 04/03/24  1631   SODIUM mmol/L 140 139  --  140  --  138 133* 134* 133*   POTASSIUM mmol/L 3.8 3.9 3.7 3.6 4.0 3.2* 3.7 3.8 3.1*   CHLORIDE mmol/L 99 102  --  101  --  102 96* 93* 90*   CO2 mmol/L 32.0* 30.0*  --  31.0*  --  23.0 25.0 30.0* 30.0*   BUN mg/dL 6* 5*  --  8  --  15 25* 19 15   CREATININE mg/dL 0.55* 0.51*  --  0.53*  --  0.52* 0.89 1.05* 1.06*   GLUCOSE mg/dL 98 98  --  94  --  72 83 88 131*   ALBUMIN g/dL  --   --   --  3.1*  --   --   --   --  4.0   BILIRUBIN mg/dL  --   --   --  0.5  --   --   --   --  1.2   ALK PHOS U/L  --   --   --  86  --   --   --   --  135*   AST (SGOT) U/L  --   --   --  21  --   --   --   --  53*    ALT (SGPT) U/L  --   --   --  26  --   --   --   --  80*   CALCIUM mg/dL 8.9 8.7  --  8.6  --  8.0* 8.7 9.1 9.2                 Microbiology   Microbiology Results (last 10 days)       Procedure Component Value - Date/Time    Clostridioides difficile Toxin - Stool, Per Rectum [[id number]]  (Normal) Collected: 04/05/24 1208    Lab Status: Final result Specimen: Stool from Per Rectum Updated: 04/05/24 1245    Narrative:      The following orders were created for panel order Clostridioides difficile Toxin - Stool, Per Rectum.  Procedure                               Abnormality         Status                     ---------                               -----------         ------                     Clostridioides difficile...[[id number]]  Normal              Final result                 Please view results for these tests on the individual orders.    Clostridioides difficile EIA - Stool, Per Rectum [[id number]]  (Normal) Collected: 04/05/24 1208    Lab Status: Final result Specimen: Stool from Per Rectum Updated: 04/05/24 1245     C Diff GDH Ag Negative     C.diff Toxin Ag Negative    Narrative:      The result indicates the absence of toxigenic C.difficile from stool specimen.    MRSA Screen, PCR (Inpatient) - Swab, Nares [[id number]]  (Normal) Collected: 04/04/24 0440    Lab Status: Final result Specimen: Swab from Nares Updated: 04/04/24 0635     MRSA PCR No MRSA Detected    Narrative:      The negative predictive value of this diagnostic test is high and should only be used to consider de-escalating anti-MRSA therapy. A positive result may indicate colonization with MRSA and must be correlated clinically.    Blood Culture - Blood, Arm, Right [[id number]]  (Normal) Collected: 04/03/24 2334    Lab Status: Final result Specimen: Blood from Arm, Right Updated: 04/09/24 0000     Blood Culture No growth at 5 days    Blood Culture - Blood, Arm, Right [[id number]]  (Normal) Collected: 04/03/24 2334    Lab Status: Final result  Specimen: Blood from Arm, Right Updated: 04/09/24 0000     Blood Culture No growth at 5 days            Medication Review:       Schedule Meds  amLODIPine, 10 mg, Oral, Daily  fluconazole, 400 mg, Oral, Q24H  heparin (porcine), 5,000 Units, Subcutaneous, Q8H  levothyroxine, 88 mcg, Oral, Q AM  lidocaine, 20 mL, Intradermal, Once  lisinopril, 20 mg, Oral, Q24H  piperacillin-tazobactam, 3.375 g, Intravenous, Q8H  sodium chloride, 10 mL, Intravenous, Q12H  sodium chloride, 10 mL, Intravenous, Q12H  vitamin D, 50,000 Units, Oral, Weekly        Infusion Meds         PRN Meds    albuterol    HYDROcodone-acetaminophen    Morphine    ondansetron    Potassium Replacement - Follow Nurse / BPA Driven Protocol    [COMPLETED] Insert Peripheral IV **AND** sodium chloride    sodium chloride    sodium chloride        Assessment & Plan       Antimicrobial Therapy   1.  IV Zosyn        2.  P.o. Diflucan        3.          4.        5.            Assessment     Acute diverticulitis.  This is the patient's first episode.  Abdomen CT scan shows several small mural abscesses-likely too small to be drained.  Blood cultures are negative so far.  Repeat CT from 4/8/2024 did not show any new acute findings     Severe leukocytosis.  Secondary to above.  White count started to improve.  Stool was negative for C. difficile toxin and antigen    Mild left lower leg swelling-Doppler was negative for DVT     Plan     Continue IV Zosyn 3.375 g every 8 hours patient will need 2 weeks of antimicrobial treatment.  Okay to switch to continuous Zosyn infusion 10.125 g every 24 hours at discharge.  Case discussed with [facility] pharmacist.  End date on 4/17/2024  Continue p.o. Diflucan 400 mg every 24 hours for 11 days for 14 days of treatment  Patient will need a midline before discharge-please remove when IV antibiotics are completed  Weekly labs CBC and creatinine x 2 weeks  Continue supportive care  Okay to discharge from Infectious Disease  standpoint  Not much more to add from infectious disease standpoint-we will sign off at this time-please call with any questions.  Ambulatory care orders for midline care and labs have been entered  Recommend repeat CT of the abdomen pelvis after IV antibiotics are completed.  Patient to follow with general surgery as an outpatient      Please fax all post discharge lab results, imaging studies and correspondence to this fax number [phone]  For any question or concern please contact our service number [phone]      Sangeeta Silver, JAIRO  04/09/24  15:23 EDT    Note is dictated utilizing voice recognition software/Dragon

## 2024-04-09 NOTE — DISCHARGE SUMMARY
"[facility] Medicine Services  Discharge Summary    Date of Service: 2024  Patient Name: Kirby Gunter  : 1965  MRN: [id number]    Date of Admission: 4/3/2024  Discharge Diagnosis: #Acute sigmoid diverticulitis multiple small abscesses, hypothyroidism, mild HERSON  Date of Discharge: 2024  Primary Care Physician: Chava López MD      Presenting Problem:   Colitis [K52.9]  Generalized abdominal pain [R10.84]  Colitis with abscess [K52.9, K63.0]    Active and Resolved Hospital Problems:  Active Hospital Problems    Diagnosis POA    **Colitis with abscess [K52.9, K63.0] Yes    Hypokalemia [E87.6] Yes    Elevated LFTs [R79.89] Yes    Acute renal insufficiency [N28.9] Yes    Leukocytosis [D72.829] Yes    Hypothyroidism [E03.9] Yes    Essential hypertension [I10] Yes      Resolved Hospital Problems   No resolved problems to display.         Hospital Course     HPI:  Admitting team HPI  Kirby Gunter is a  very pleasant 59 y.o. female with a CMH of hypertension and hypothyroidism who presented to [facility] on 4/3/2024 with complaints of abdominal pain and nausea for the past 5 days.  Denies any fever cough congestion chest pain shortness of breath or diarrhea..  She is afebrile not hypotensive initially tachycardic which has improved.  She reports she thought she was initially constipated but subsequently had a \"blowout\" earlier today.  She denies any melena, hematochezia or hematemesis.  Labs today showed a sodium of 133 potassium 3.1 creatinine 1.06 alk phos 135 AST 53 ALT 80, WBC 32.9 hemoglobin 11.6 urinalysis shows no bacteria, CT abdomen and pelvis with contrast per radiology today showed severe inflammation of the colon with intramural abscess per radiology findings may indicate diverticulitis or focal colitis.  Limited characterization due to intense inflammation.  Per radiology there is a secondary severe inflammation of the adjacent small bowel without " fistulization appreciated.     Hospital Course:  #Acute sigmoid diverticulitis multiple small abscesses  Repeat CT abdomen pelvis reviewed no change.  Clinically improving  Seen by surgery no surgical intervention planned.  Outpatient follow-up  ID following  Started on IV Zosyn and IV micafungin.  Repeat CT head stable afebrile clinically improving.  Micafungin transition to fluconazole to finish total of 2 weeks.  To continue IV Zosyn on discharge to finish 2 weeks.  Midline ordered  Pain control           #Hypertension  BP stable  Continue amlodipine 10 and lisinopril 20     #Hypothyroidism  Continue home dose of levothyroxine 88 mcg     #Mild HERSON-resolved        #Electrolyte normalities  Keep magnesium more than 2 and potassium more than 4           DISCHARGE Follow Up Recommendations for labs and diagnostics: weekly CBC and BMPOutpatient follow-up with general surgery      Reasons For Change In Medications and Indications for New Medications:      Day of Discharge     Vital Signs:  Temp:  [97.4 °F (36.3 °C)-98.4 °F (36.9 °C)] 98.3 °F (36.8 °C)  Heart Rate:  [85-96] 96  Resp:  [18-24] 24  BP: (132-134)/(80-91) 134/84  Flow (L/min):  [2] 2    Physical Exam:  Physical Exam AOx3 NAD  RRR S1-S2 audible  Lungs CTA  Abdomen with mild lower abdominal tenderness no guarding no rigidity same as before      Pertinent  and/or Most Recent Results     LAB RESULTS:      Lab 04/09/24  0106 04/08/24  0042 04/07/24  0144 04/06/24  0151 04/05/24  0346 04/04/24  0601 04/04/24  0538   WBC 16.37* 15.72* 16.41* 21.25* 31.46*  --  33.58*   HEMOGLOBIN 10.1* 9.6* 9.9* 8.7* 9.3*  --  11.0*   HEMATOCRIT 32.9* 30.8* 32.0* 28.0* 29.5*  --  34.2   PLATELETS 556* 452* 450 344 317  --  358   NEUTROS ABS 11.26* 11.34* 12.37* 17.49* 28.00*  --  29.16*   IMMATURE GRANS (ABS) 0.34* 0.21* 0.24* 0.31*  --   --  0.35*   LYMPHS ABS 3.09 2.40 2.11 1.72  --   --  1.98   MONOS ABS 1.09* 1.34* 1.37* 1.60*  --   --  1.99*   EOS ABS 0.50* 0.34 0.25 0.09  --    --  0.01   MCV 95.1 93.9 94.1 94.9 93.9  --  92.9   LACTATE  --   --   --   --   --  1.0  --          Lab 04/09/24  0106 04/08/24  0042 04/07/24  1620 04/07/24  0144 04/06/24  1328 04/06/24  0151 04/05/24  0346   SODIUM 140 139  --  140  --  138 133*   POTASSIUM 3.8 3.9 3.7 3.6 4.0 3.2* 3.7   CHLORIDE 99 102  --  101  --  102 96*   CO2 32.0* 30.0*  --  31.0*  --  23.0 25.0   ANION GAP 9.0 7.0  --  8.0  --  13.0 12.0   BUN 6* 5*  --  8  --  15 25*   CREATININE 0.55* 0.51*  --  0.53*  --  0.52* 0.89   EGFR 104.4 106.4  --  105.4  --  105.9 73.9   GLUCOSE 98 98  --  94  --  72 83   CALCIUM 8.9 8.7  --  8.6  --  8.0* 8.7         Lab 04/07/24  0144 04/03/24  1631   TOTAL PROTEIN 6.0 7.3   ALBUMIN 3.1* 4.0   GLOBULIN 2.9 3.3   ALT (SGPT) 26 80*   AST (SGOT) 21 53*   BILIRUBIN 0.5 1.2   ALK PHOS 86 135*                     Brief Urine Lab Results  (Last result in the past 365 days)        Color   Clarity   Blood   Leuk Est   Nitrite   Protein   CREAT   Urine HCG        04/03/24 1652 Yellow   Cloudy   Trace   Trace   Negative   30 mg/dL (1+)                 Microbiology Results (last 10 days)       Procedure Component Value - Date/Time    Clostridioides difficile Toxin - Stool, Per Rectum [[id number]]  (Normal) Collected: 04/05/24 1208    Lab Status: Final result Specimen: Stool from Per Rectum Updated: 04/05/24 1245    Narrative:      The following orders were created for panel order Clostridioides difficile Toxin - Stool, Per Rectum.  Procedure                               Abnormality         Status                     ---------                               -----------         ------                     Clostridioides difficile...[[id number]]  Normal              Final result                 Please view results for these tests on the individual orders.    Clostridioides difficile EIA - Stool, Per Rectum [[id number]]  (Normal) Collected: 04/05/24 1208    Lab Status: Final result Specimen: Stool from Per Rectum Updated:  04/05/24 1245     C Diff GDH Ag Negative     C.diff Toxin Ag Negative    Narrative:      The result indicates the absence of toxigenic C.difficile from stool specimen.    MRSA Screen, PCR (Inpatient) - Swab, Nares [[id number]]  (Normal) Collected: 04/04/24 0440    Lab Status: Final result Specimen: Swab from Nares Updated: 04/04/24 0635     MRSA PCR No MRSA Detected    Narrative:      The negative predictive value of this diagnostic test is high and should only be used to consider de-escalating anti-MRSA therapy. A positive result may indicate colonization with MRSA and must be correlated clinically.    Blood Culture - Blood, Arm, Right [[id number]]  (Normal) Collected: 04/03/24 2334    Lab Status: Final result Specimen: Blood from Arm, Right Updated: 04/09/24 0000     Blood Culture No growth at 5 days    Blood Culture - Blood, Arm, Right [[id number]]  (Normal) Collected: 04/03/24 2334    Lab Status: Final result Specimen: Blood from Arm, Right Updated: 04/09/24 0000     Blood Culture No growth at 5 days            CT Abdomen Pelvis With Contrast    Result Date: 4/9/2024  Impression: Impression: 1.Sigmoid diverticulitis with small abscesses in the central lower abdomen and within the right pelvis, further described above. Inflammatory changes about the sigmoid colon appears similar since the previous study. 2.Please see above for additional details. Electronically Signed: Eben Hoover MD  4/9/2024 9:19 AM EDT  Workstation ID: [id number]    XR Chest 1 View    Result Date: 4/6/2024  Impression: Impression: No active disease Electronically Signed: Patel Sunshine MD  4/6/2024 6:03 AM EDT  Workstation ID: [id number]    CT Abdomen Pelvis With Contrast    Result Date: 4/5/2024  Impression: Impression: Persistent moderate wall thickening within the proximal/mid sigmoid colon may represent diverticulitis or colitis of other etiology. 2 cm mural abscess is centered on series 3, image 101. Additional 2.8 x 1.3 cm peripherally  enhancing fluid collection on  series 3, image 84, compatible with additional small abscess. There is moderate fat stranding within the lower abdomen, and there is mild pelvic free fluid. Findings appear overall similar since 4/3/2024. No free air is identified. Electronically Signed: Eben Hoover MD  4/5/2024 11:18 AM EDT  Workstation ID: [id number]    CT Abdomen Pelvis With Contrast    Result Date: 4/3/2024  Impression: Severe inflammation of the colon with intramural abscess. Findings may indicate diverticulitis or focal colitis. Limited characterization due to intense inflammation. There is secondary severe inflammation of an adjacent small bowel without fistulization appreciated. Electronically Signed: Alexi Kirk MD  4/3/2024 9:46 PM EDT  Workstation ID: [id number]    CT Abdomen Pelvis Without Contrast    Result Date: 4/3/2024  Impression: Impression: 1. Long segment of severe diffuse thickening of the sigmoid colon wall with extensive pericolonic fat stranding compatible with sigmoiditis. Adjacent to the inflamed sigmoid colon towards the right there is diffuse thickening of few small bowel loops with surrounding fat stranding compatible with an ileitis or reactive inflammatory response. There is a focal area of loss of the fat planes between the sigmoid colon and adjacent small bowel loop at this level. A fistulous tract is not evident but cannot be excluded. Further evaluation with a CT abdomen pelvis with intravenous and oral contrast recommended 2. No definite focal fluid collections or free air identified Electronically Signed: Ari Bettencourt MD  4/3/2024 5:47 PM EDT  Workstation ID: [id number]    XR Abdomen KUB    Result Date: 4/2/2024  Impression: Possible constipation in the appropriate clinical setting. Electronically Signed: Alexi Kirk MD  4/2/2024 6:44 PM EDT  Workstation ID: [id number]     Results for orders placed during the hospital encounter of 04/03/24    Duplex Venous Lower Extremity - Left  CAR    Interpretation Summary    Normal left lower extremity venous duplex scan.      Results for orders placed during the hospital encounter of 04/03/24    Duplex Venous Lower Extremity - Left CAR    Interpretation Summary    Normal left lower extremity venous duplex scan.          Labs Pending at Discharge:      Procedures Performed           Consults:   Consults       Date and Time Order Name Status Description    4/5/2024  9:50 AM Inpatient Infectious Diseases Consult Completed     4/3/2024 11:14 PM Inpatient General Surgery Consult Completed     4/3/2024  9:53 PM Hospitalist (on-call MD unless specified)                Discharge Details        Discharge Medications        Continue These Medications        Instructions Start Date   albuterol sulfate  (90 Base) MCG/ACT inhaler  Commonly known as: PROVENTIL HFA;VENTOLIN HFA;PROAIR HFA   2 puffs, Inhalation, Every 4 Hours PRN      amLODIPine 5 MG tablet  Commonly known as: NORVASC   5 mg, Oral, Daily      levothyroxine 88 MCG tablet  Commonly known as: SYNTHROID, LEVOTHROID   88 mcg, Oral, Daily      lisinopril-hydrochlorothiazide 20-25 MG per tablet  Commonly known as: PRINZIDE,ZESTORETIC   1 tablet, Oral, Daily             ASK your doctor about these medications        Instructions Start Date   sennosides-docusate 8.6-50 MG per tablet  Commonly known as: PERICOLACE  Ask about: Should I take this medication?   1 tablet, Oral, Daily      vitamin D 1.25 MG (83979 UT) capsule capsule  Commonly known as: ERGOCALCIFEROL  Ask about: Which instructions should I use?   50,000 Units, Oral, Weekly, Sundays                No Known Allergies      Discharge Disposition: Home with IV antibiotics      Diet:  Hospital:  Diet Order   Procedures    Diet: Gastrointestinal; Fiber-Restricted, Low Irritant; Texture: Soft to Chew (NDD 3); Soft to Chew: Whole Meat; Fluid Consistency: Thin (IDDSI 0)         Discharge Activity:         CODE STATUS:  Code Status and Medical  Interventions:   Ordered at: 04/03/24 2211     Code Status (Patient has no pulse and is not breathing):    CPR (Attempt to Resuscitate)     Medical Interventions (Patient has pulse or is breathing):    Full Support         Future Appointments   Date Time Provider Department Center   7/30/2024  9:45 AM Chava López MD [facility]           Time spent on Discharge including face to face service:  >30 minutes    Signature: Electronically signed by Devin Ortega MD, 04/09/24, 13:19 EDT.  [facility] Hospitalist Team

## 2024-04-09 NOTE — PLAN OF CARE
Goal Outcome Evaluation:      Pt alert and oriented. Pt complaints of pain managed with PO medication, see MAR. Pt up ad saravanan ambulating in room. Pt receiving IV antibiotic therapy. VS stable. Plan of care ongoing.

## 2024-04-09 NOTE — CONSULTS
Midline Line Insertion Procedure Note    Procedure: Insertion of #18G/10CM PowerMidline    Indications:  Home IV therapy    Procedure Details:   Sterile technique was used including antiseptics, cap, gloves, gown, hand hygiene, mask, and sheet.    #18G/10CM PowerMidline inserted to the L Basilic vein per hospital protocol by Jerry Angel RN.     Non-pulsatile blood return: yes    Ultrasound Guidance: Yes    Lot #: [id number]  Expiration date: 2025-01-31    Complications:  none    Findings:  Catheter inserted to 10 cm, with 0 cm exposed.   Mid upper arm circumference is 27 cm.  Catheter was flushed with 10 cc NS and sterile dressing applied.   Patient tolerated procedure well.    Recommendations:  Verbal and/or written Care/Maintenance instructions provided to patient.   Primary nurse notified that midline is okay to use at this time.     Stalin Angel RN  04/09/24  18:32 EDT

## 2024-04-09 NOTE — PROGRESS NOTES
General Surgery Progress Note    Name: Kirby Gunter ADMIT: 4/3/2024   : 1965  PCP: Chava López MD    MRN: [id number] LOS: 6 days   AGE/SEX: 59 y.o. female  ROOM: [address] [place] [address]    Chief Complaint   Patient presents with    Abdominal Pain     Abd pain, started on Friday, pt is nauseated, pt was seen at urgent care and given medications for constipation that worked but still feeling pain.       Subjective     Abdominal pain improving.  Tolerating a diet but not eating much.    Objective     Scheduled Medications:   amLODIPine, 10 mg, Oral, Daily  fluconazole, 400 mg, Oral, Q24H  heparin (porcine), 5,000 Units, Subcutaneous, Q8H  levothyroxine, 88 mcg, Oral, Q AM  lisinopril, 20 mg, Oral, Q24H  piperacillin-tazobactam, 3.375 g, Intravenous, Q8H  vitamin D, 50,000 Units, Oral, Weekly        Active Infusions:       As Needed Medications:    albuterol    HYDROcodone-acetaminophen    Morphine    ondansetron    Potassium Replacement - Follow Nurse / BPA Driven Protocol    [COMPLETED] Insert Peripheral IV **AND** sodium chloride    Vital Signs  Vital Signs Patient Vitals for the past 24 hrs:   BP Temp Temp src Pulse Resp SpO2   24 1148 134/84 98.3 °F (36.8 °C) Oral 96 24 96 %   24 0500 134/91 97.4 °F (36.3 °C) Oral 85 18 96 %   24 2100 132/80 98.4 °F (36.9 °C) Oral 90 20 91 %     I/O:  I/O last 3 completed shifts:  In: 360 [P.O.:360]  Out: 800 [Urine:800]    Physical Exam:  Physical Exam  Cardiovascular:      Rate and Rhythm: Normal rate.   Pulmonary:      Effort: Pulmonary effort is normal.   Abdominal:      General: Abdomen is flat.      Palpations: Abdomen is soft.   Neurological:      Mental Status: She is alert.         Results Review:     CBC    Results from last 7 days   Lab Units 24  0106 24  0042 24  0144 24  0151 24  0346 24  0538 24  1631   WBC 10*3/mm3 16.37* 15.72* 16.41* 21.25* 31.46* 33.58* 32.90*   HEMOGLOBIN g/dL 10.1* 9.6*  9.9* 8.7* 9.3* 11.0* 11.6*   PLATELETS 10*3/mm3 556* 452* 450 344 317 358 364     BMP   Results from last 7 days   Lab Units 04/09/24  0106 04/08/24  0042 04/07/24  1620 04/07/24  0144 04/06/24  1328 04/06/24  0151 04/05/24  0346 04/04/24  0538 04/03/24  1631   SODIUM mmol/L 140 139  --  140  --  138 133* 134* 133*   POTASSIUM mmol/L 3.8 3.9 3.7 3.6 4.0 3.2* 3.7 3.8 3.1*   CHLORIDE mmol/L 99 102  --  101  --  102 96* 93* 90*   CO2 mmol/L 32.0* 30.0*  --  31.0*  --  23.0 25.0 30.0* 30.0*   BUN mg/dL 6* 5*  --  8  --  15 25* 19 15   CREATININE mg/dL 0.55* 0.51*  --  0.53*  --  0.52* 0.89 1.05* 1.06*   GLUCOSE mg/dL 98 98  --  94  --  72 83 88 131*     Radiology(recent) CT Abdomen Pelvis With Contrast    Result Date: 4/9/2024  Impression: 1.Sigmoid diverticulitis with small abscesses in the central lower abdomen and within the right pelvis, further described above. Inflammatory changes about the sigmoid colon appears similar since the previous study. 2.Please see above for additional details. Electronically Signed: Eben Hoover MD  4/9/2024 9:19 AM EDT  Workstation ID: [id number]     I reviewed the patient's new clinical results.  I reviewed the patient's new imaging results and agree with the interpretation.  I reviewed the patient's other test results and agree with the interpretation    Assessment & Plan       Colitis with abscess    Essential hypertension    Hypothyroidism    Hypokalemia    Elevated LFTs    Acute renal insufficiency    Leukocytosis      59 y.o. female with diverticulitis    Repeat CT scan reviewed and agree with findings.  Stable without significant change.  Clinically she is doing well.  She is okay for discharge from my standpoint.  Follow-up with me in 2 weeks.    Kojo Barnett MD  04/09/24  12:58 EDT

## 2024-04-10 ENCOUNTER — TRANSITIONAL CARE MANAGEMENT TELEPHONE ENCOUNTER (OUTPATIENT)
Dept: CALL CENTER | Facility: HOSPITAL | Age: 59
End: 2024-04-10
Payer: MEDICAID

## 2024-04-10 DIAGNOSIS — K57.92 DIVERTICULITIS: Primary | ICD-10-CM

## 2024-04-10 NOTE — OUTREACH NOTE
Call Center TCM Note      Flowsheet Row Responses   [facility] patient discharged from? [facility]   Does the patient have one of the following disease processes/diagnoses(primary or secondary)? Other   TCM attempt successful? Yes   Call start time 1040   Call end time 1043   Discharge diagnosis Colitis with abscess   Meds reviewed with patient/caregiver? Yes   Is the patient having any side effects they believe may be caused by any medication additions or changes? No   Does the patient have all medications ordered at discharge? Yes   Is the patient taking all medications as directed (includes completed medication regime)? Yes   Medication comments Patient reports she has call in to [facility] reagarding IV abx administration, states she just wants to make sure she is doing it correctly.   Comments Hospital d/c follow up 4/16/24@0800.   Does the patient have an appointment with their PCP within 7-14 days of discharge? Yes   What is the Home health agency?  [facility] for IV [facility]   Has home health visited the patient within 72 hours of discharge? N/A   Psychosocial issues? No   Did the patient receive a copy of their discharge instructions? Yes   Nursing interventions Reviewed instructions with patient   What is the patient's perception of their health status since discharge? Improving   Is the patient/caregiver able to teach back signs and symptoms related to disease process for when to call PCP? Yes   Is the patient/caregiver able to teach back signs and symptoms related to disease process for when to call 911? Yes   Is the patient/caregiver able to teach back the hierarchy of who to call/visit for symptoms/problems? PCP, Specialist, Home health nurse, Urgent Care, ED, 911 Yes   TCM call completed? Yes   Wrap up additional comments Ketty reports decreased appetite since d/c, beginning IV abx today.   Call end time 1043   Would this patient benefit from a Referral to [facility] Social Work? No   Is the patient  interested in additional calls from an ambulatory ? No            Ana Beltrán RN    4/10/2024, 10:50 EDT

## 2024-04-10 NOTE — PAYOR COMM NOTE
"This is discharge notification for Kirby Simmons  Reference/Auth # [id number]   Pt discharged routine to home on 4/9/24 with [facility] for home IV antibx.    EMIGDIO Gudino, RN, [facility]  Utilization Review Nurse  [facility]  Direct & confidential phone # [phone]  Fax # [phone]      Kirby Simmons (59 y.o. Female)       Date of Birth   1965    Social Security Number       Address   [address] [place] [address]    Home Phone   [phone]    MRN   [id number]       Spiritism   Patient Refused    Marital Status   Single                            Admission Date   4/3/24    Admission Type   Emergency    Admitting Provider   Dale Eubanks MD    Attending Provider       Department, Room/Bed   [facility] SURGICAL INPATIENT, 4115/1       Discharge Date   4/9/2024    Discharge Disposition   Home-Health Care Svc    Discharge Destination                                 Attending Provider: (none)   Allergies: No Known Allergies    Isolation: None   Infection: None   Code Status: Prior    Ht: 157.5 cm (62\")   Wt: 66.6 kg (146 lb 13.2 oz)    Admission Cmt: None   Principal Problem: Colitis with abscess [K52.9,K63.0]                   Active Insurance as of 4/3/2024       Primary Coverage       Payor Plan Insurance Group Employer/Plan Group    [id number]       Payor Plan Address Payor Plan Phone Number Payor Plan Fax Number Effective Dates    MAIL STOP:   4/1/2017 - None Entered    [address] [address]       [facility] [address]         Subscriber Name Subscriber Birth Date Member ID       KIRBY SIMMONS 1965 [id number]                     Emergency Contacts        (Rel.) Home Phone Work Phone Mobile Phone    MARTÍN RANDHAWA (Significant Other) [phone] -- --    ARCELIA CALVERT (Sister) -- -- --                 Discharge Summary        Devin Ortega MD at 04/09/24 1319                       [facility] " "Hospital Medicine Services  Discharge Summary    Date of Service: 2024  Patient Name: Kirby Gunter  : 1965  MRN: [id number]    Date of Admission: 4/3/2024  Discharge Diagnosis: #Acute sigmoid diverticulitis multiple small abscesses, hypothyroidism, mild HERSON  Date of Discharge: 2024  Primary Care Physician: Chava López MD      Presenting Problem:   Colitis [K52.9]  Generalized abdominal pain [R10.84]  Colitis with abscess [K52.9, K63.0]    Active and Resolved Hospital Problems:  Active Hospital Problems    Diagnosis POA    **Colitis with abscess [K52.9, K63.0] Yes    Hypokalemia [E87.6] Yes    Elevated LFTs [R79.89] Yes    Acute renal insufficiency [N28.9] Yes    Leukocytosis [D72.829] Yes    Hypothyroidism [E03.9] Yes    Essential hypertension [I10] Yes      Resolved Hospital Problems   No resolved problems to display.         Hospital Course     HPI:  Admitting team HPI  Kirby Gunter is a  very pleasant 59 y.o. female with a CMH of hypertension and hypothyroidism who presented to [facility] on 4/3/2024 with complaints of abdominal pain and nausea for the past 5 days.  Denies any fever cough congestion chest pain shortness of breath or diarrhea..  She is afebrile not hypotensive initially tachycardic which has improved.  She reports she thought she was initially constipated but subsequently had a \"blowout\" earlier today.  She denies any melena, hematochezia or hematemesis.  Labs today showed a sodium of 133 potassium 3.1 creatinine 1.06 alk phos 135 AST 53 ALT 80, WBC 32.9 hemoglobin 11.6 urinalysis shows no bacteria, CT abdomen and pelvis with contrast per radiology today showed severe inflammation of the colon with intramural abscess per radiology findings may indicate diverticulitis or focal colitis.  Limited characterization due to intense inflammation.  Per radiology there is a secondary severe inflammation of the adjacent small bowel without fistulization appreciated. "     Hospital Course:  #Acute sigmoid diverticulitis multiple small abscesses  Repeat CT abdomen pelvis reviewed no change.  Clinically improving  Seen by surgery no surgical intervention planned.  Outpatient follow-up  ID following  Started on IV Zosyn and IV micafungin.  Repeat CT head stable afebrile clinically improving.  Micafungin transition to fluconazole to finish total of 2 weeks.  To continue IV Zosyn on discharge to finish 2 weeks.  Midline ordered  Pain control           #Hypertension  BP stable  Continue amlodipine 10 and lisinopril 20     #Hypothyroidism  Continue home dose of levothyroxine 88 mcg     #Mild HERSON-resolved        #Electrolyte normalities  Keep magnesium more than 2 and potassium more than 4           DISCHARGE Follow Up Recommendations for labs and diagnostics: weekly CBC and BMPOutpatient follow-up with general surgery      Reasons For Change In Medications and Indications for New Medications:      Day of Discharge     Vital Signs:  Temp:  [97.4 °F (36.3 °C)-98.4 °F (36.9 °C)] 98.3 °F (36.8 °C)  Heart Rate:  [85-96] 96  Resp:  [18-24] 24  BP: (132-134)/(80-91) 134/84  Flow (L/min):  [2] 2    Physical Exam:  Physical Exam AOx3 NAD  RRR S1-S2 audible  Lungs CTA  Abdomen with mild lower abdominal tenderness no guarding no rigidity same as before      Pertinent  and/or Most Recent Results     LAB RESULTS:      Lab 04/09/24  0106 04/08/24  0042 04/07/24  0144 04/06/24  0151 04/05/24  0346 04/04/24  0601 04/04/24  0538   WBC 16.37* 15.72* 16.41* 21.25* 31.46*  --  33.58*   HEMOGLOBIN 10.1* 9.6* 9.9* 8.7* 9.3*  --  11.0*   HEMATOCRIT 32.9* 30.8* 32.0* 28.0* 29.5*  --  34.2   PLATELETS 556* 452* 450 344 317  --  358   NEUTROS ABS 11.26* 11.34* 12.37* 17.49* 28.00*  --  29.16*   IMMATURE GRANS (ABS) 0.34* 0.21* 0.24* 0.31*  --   --  0.35*   LYMPHS ABS 3.09 2.40 2.11 1.72  --   --  1.98   MONOS ABS 1.09* 1.34* 1.37* 1.60*  --   --  1.99*   EOS ABS 0.50* 0.34 0.25 0.09  --   --  0.01   MCV 95.1 93.9  94.1 94.9 93.9  --  92.9   LACTATE  --   --   --   --   --  1.0  --          Lab 04/09/24  0106 04/08/24  0042 04/07/24  1620 04/07/24  0144 04/06/24  1328 04/06/24  0151 04/05/24  0346   SODIUM 140 139  --  140  --  138 133*   POTASSIUM 3.8 3.9 3.7 3.6 4.0 3.2* 3.7   CHLORIDE 99 102  --  101  --  102 96*   CO2 32.0* 30.0*  --  31.0*  --  23.0 25.0   ANION GAP 9.0 7.0  --  8.0  --  13.0 12.0   BUN 6* 5*  --  8  --  15 25*   CREATININE 0.55* 0.51*  --  0.53*  --  0.52* 0.89   EGFR 104.4 106.4  --  105.4  --  105.9 73.9   GLUCOSE 98 98  --  94  --  72 83   CALCIUM 8.9 8.7  --  8.6  --  8.0* 8.7         Lab 04/07/24  0144 04/03/24  1631   TOTAL PROTEIN 6.0 7.3   ALBUMIN 3.1* 4.0   GLOBULIN 2.9 3.3   ALT (SGPT) 26 80*   AST (SGOT) 21 53*   BILIRUBIN 0.5 1.2   ALK PHOS 86 135*                     Brief Urine Lab Results  (Last result in the past 365 days)        Color   Clarity   Blood   Leuk Est   Nitrite   Protein   CREAT   Urine HCG        04/03/24 1652 Yellow   Cloudy   Trace   Trace   Negative   30 mg/dL (1+)                 Microbiology Results (last 10 days)       Procedure Component Value - Date/Time    Clostridioides difficile Toxin - Stool, Per Rectum [[id number]]  (Normal) Collected: 04/05/24 1208    Lab Status: Final result Specimen: Stool from Per Rectum Updated: 04/05/24 1245    Narrative:      The following orders were created for panel order Clostridioides difficile Toxin - Stool, Per Rectum.  Procedure                               Abnormality         Status                     ---------                               -----------         ------                     Clostridioides difficile...[[id number]]  Normal              Final result                 Please view results for these tests on the individual orders.    Clostridioides difficile EIA - Stool, Per Rectum [[id number]]  (Normal) Collected: 04/05/24 1208    Lab Status: Final result Specimen: Stool from Per Rectum Updated: 04/05/24 1245     C Diff [facility]  Ag Negative     C.diff Toxin Ag Negative    Narrative:      The result indicates the absence of toxigenic C.difficile from stool specimen.    MRSA Screen, PCR (Inpatient) - Swab, Nares [[id number]]  (Normal) Collected: 04/04/24 0440    Lab Status: Final result Specimen: Swab from Nares Updated: 04/04/24 0635     MRSA PCR No MRSA Detected    Narrative:      The negative predictive value of this diagnostic test is high and should only be used to consider de-escalating anti-MRSA therapy. A positive result may indicate colonization with MRSA and must be correlated clinically.    Blood Culture - Blood, Arm, Right [[id number]]  (Normal) Collected: 04/03/24 2334    Lab Status: Final result Specimen: Blood from Arm, Right Updated: 04/09/24 0000     Blood Culture No growth at 5 days    Blood Culture - Blood, Arm, Right [[id number]]  (Normal) Collected: 04/03/24 2334    Lab Status: Final result Specimen: Blood from Arm, Right Updated: 04/09/24 0000     Blood Culture No growth at 5 days            CT Abdomen Pelvis With Contrast    Result Date: 4/9/2024  Impression: Impression: 1.Sigmoid diverticulitis with small abscesses in the central lower abdomen and within the right pelvis, further described above. Inflammatory changes about the sigmoid colon appears similar since the previous study. 2.Please see above for additional details. Electronically Signed: Eben Hoover MD  4/9/2024 9:19 AM EDT  Workstation ID: [id number]    XR Chest 1 View    Result Date: 4/6/2024  Impression: Impression: No active disease Electronically Signed: Patel Sunshine MD  4/6/2024 6:03 AM EDT  Workstation ID: [id number]    CT Abdomen Pelvis With Contrast    Result Date: 4/5/2024  Impression: Impression: Persistent moderate wall thickening within the proximal/mid sigmoid colon may represent diverticulitis or colitis of other etiology. 2 cm mural abscess is centered on series 3, image 101. Additional 2.8 x 1.3 cm peripherally enhancing fluid collection on   series 3, image 84, compatible with additional small abscess. There is moderate fat stranding within the lower abdomen, and there is mild pelvic free fluid. Findings appear overall similar since 4/3/2024. No free air is identified. Electronically Signed: Eben Hoover MD  4/5/2024 11:18 AM EDT  Workstation ID: [id number]    CT Abdomen Pelvis With Contrast    Result Date: 4/3/2024  Impression: Severe inflammation of the colon with intramural abscess. Findings may indicate diverticulitis or focal colitis. Limited characterization due to intense inflammation. There is secondary severe inflammation of an adjacent small bowel without fistulization appreciated. Electronically Signed: Alexi Kirk MD  4/3/2024 9:46 PM EDT  Workstation ID: [id number]    CT Abdomen Pelvis Without Contrast    Result Date: 4/3/2024  Impression: Impression: 1. Long segment of severe diffuse thickening of the sigmoid colon wall with extensive pericolonic fat stranding compatible with sigmoiditis. Adjacent to the inflamed sigmoid colon towards the right there is diffuse thickening of few small bowel loops with surrounding fat stranding compatible with an ileitis or reactive inflammatory response. There is a focal area of loss of the fat planes between the sigmoid colon and adjacent small bowel loop at this level. A fistulous tract is not evident but cannot be excluded. Further evaluation with a CT abdomen pelvis with intravenous and oral contrast recommended 2. No definite focal fluid collections or free air identified Electronically Signed: Ari Bettencourt MD  4/3/2024 5:47 PM EDT  Workstation ID: [id number]    XR Abdomen KUB    Result Date: 4/2/2024  Impression: Possible constipation in the appropriate clinical setting. Electronically Signed: Alexi Kirk MD  4/2/2024 6:44 PM EDT  Workstation ID: [id number]     Results for orders placed during the hospital encounter of 04/03/24    Duplex Venous Lower Extremity - Left CAR    Interpretation Summary     Normal left lower extremity venous duplex scan.      Results for orders placed during the hospital encounter of 04/03/24    Duplex Venous Lower Extremity - Left CAR    Interpretation Summary    Normal left lower extremity venous duplex scan.          Labs Pending at Discharge:      Procedures Performed           Consults:   Consults       Date and Time Order Name Status Description    4/5/2024  9:50 AM Inpatient Infectious Diseases Consult Completed     4/3/2024 11:14 PM Inpatient General Surgery Consult Completed     4/3/2024  9:53 PM Hospitalist (on-call MD unless specified)                Discharge Details        Discharge Medications        Continue These Medications        Instructions Start Date   albuterol sulfate  (90 Base) MCG/ACT inhaler  Commonly known as: PROVENTIL HFA;VENTOLIN HFA;PROAIR HFA   2 puffs, Inhalation, Every 4 Hours PRN      amLODIPine 5 MG tablet  Commonly known as: NORVASC   5 mg, Oral, Daily      levothyroxine 88 MCG tablet  Commonly known as: SYNTHROID, LEVOTHROID   88 mcg, Oral, Daily      lisinopril-hydrochlorothiazide 20-25 MG per tablet  Commonly known as: PRINZIDE,ZESTORETIC   1 tablet, Oral, Daily             ASK your doctor about these medications        Instructions Start Date   sennosides-docusate 8.6-50 MG per tablet  Commonly known as: PERICOLACE  Ask about: Should I take this medication?   1 tablet, Oral, Daily      vitamin D 1.25 MG (58938 UT) capsule capsule  Commonly known as: ERGOCALCIFEROL  Ask about: Which instructions should I use?   50,000 Units, Oral, Weekly, Sundays                No Known Allergies      Discharge Disposition: Home with IV antibiotics      Diet:  Hospital:  Diet Order   Procedures    Diet: Gastrointestinal; Fiber-Restricted, Low Irritant; Texture: Soft to Chew (NDD 3); Soft to Chew: Whole Meat; Fluid Consistency: Thin (IDDSI 0)         Discharge Activity:         CODE STATUS:  Code Status and Medical Interventions:   Ordered at: 04/03/24 8481      Code Status (Patient has no pulse and is not breathing):    CPR (Attempt to Resuscitate)     Medical Interventions (Patient has pulse or is breathing):    Full Support         Future Appointments   Date Time Provider Department Center   7/30/2024  9:45 AM Chava López MD [facility]           Time spent on Discharge including face to face service:  >30 minutes    Signature: Electronically signed by Devin Ortega MD, 04/09/24, 13:19 EDT.  [facility] Hospitalist Team      Electronically signed by Devin Ortega MD at 04/09/24 1089

## 2024-04-10 NOTE — OUTREACH NOTE
Prep Survey      Flowsheet Row Responses   [facility] patient discharged from? [facility]   Is LACE score < 7 ? No   Eligibility [facility]   Date of Admission 04/03/24   Date of Discharge 04/09/24   Discharge Disposition Home-Health Care [facility]   Discharge diagnosis Colitis with abscess   Does the patient have one of the following disease processes/diagnoses(primary or secondary)? Other   Does the patient have Home health ordered? Yes   What is the Home health agency?  [facility] for [facility]   Is there a DME ordered? No   Prep survey completed? Yes            Bri SAEZ - Registered Nurse

## 2024-04-10 NOTE — TELEPHONE ENCOUNTER
"Reason for Disposition   General information question, no triage required and triager able to answer question    Additional Information   Negative: [1] Caller is not with the adult (patient) AND [2] reporting urgent symptoms   Negative: Lab result questions   Negative: Medication questions   Negative: Caller can't be reached by phone   Negative: Caller has already spoken to PCP or another triager   Negative: RN needs further essential information from caller in order to complete triage   Negative: Requesting regular office appointment   Negative: [1] Caller requesting NON-URGENT health information AND [2] PCP's office is the best resource   Negative: Health Information question, no triage required and triager able to answer question    Answer Assessment - Initial Assessment Questions  1. REASON FOR CALL or QUESTION: \"What is your reason for calling today?\" or \"How can I best help you?\" or \"What question do you have that I can help answer?\"      I was just discharged from hospital, been home about 15 minutes and was wanting to know if someone was going to deliver my home antibiotics tonight.    Protocols used: Information Only Call-ADULT-    "

## 2024-04-10 NOTE — PLAN OF CARE
Goal Outcome Evaluation:                        Patient discharging home. Will discharge with midline in place and will be doing IV antibiotics at home. Patient will follow up with Dr. Barnett.

## 2024-04-10 NOTE — CASE MANAGEMENT/SOCIAL WORK
Case Management Discharge Note      Final Note: HOME    Provided Post Acute Provider List?: N/A  N/A Provider List Comment: denies dc needs    Selected Continued Care - Discharged on 4/9/2024 Admission date: 4/3/2024 - Discharge disposition: Home-Health Care c          Dialysis/Infusion Coordination complete.      Service Provider Selected Services Address Phone Fax Patient Preferred    [facility] [id number] [phone] --     [facility] [address] [facility] [address] [phone] --       Internal Comment last updated by Aundrea Zhou RN 4/9/2024 1523    Spoke with Yris in amb care to inform her of patient needing amb care.                              Transportation Services  Private: Car    Final Discharge Disposition Code: 01 - home or self-care

## 2024-04-16 ENCOUNTER — HOSPITAL ENCOUNTER (OUTPATIENT)
Dept: INFUSION THERAPY | Facility: HOSPITAL | Age: 59
Discharge: HOME | End: 2024-04-16
Admitting: NURSE PRACTITIONER
Payer: MEDICAID

## 2024-04-16 ENCOUNTER — OFFICE VISIT (OUTPATIENT)
Dept: FAMILY MEDICINE CLINIC | Facility: CLINIC | Age: 59
End: 2024-04-16
Payer: MEDICAID

## 2024-04-16 VITALS
TEMPERATURE: 98 F | DIASTOLIC BLOOD PRESSURE: 64 MMHG | HEART RATE: 98 BPM | RESPIRATION RATE: 18 BRPM | OXYGEN SATURATION: 98 % | SYSTOLIC BLOOD PRESSURE: 122 MMHG

## 2024-04-16 VITALS
OXYGEN SATURATION: 93 % | HEART RATE: 81 BPM | BODY MASS INDEX: 25.69 KG/M2 | WEIGHT: 139.6 LBS | DIASTOLIC BLOOD PRESSURE: 64 MMHG | HEIGHT: 62 IN | TEMPERATURE: 98.6 F | SYSTOLIC BLOOD PRESSURE: 98 MMHG

## 2024-04-16 DIAGNOSIS — K57.92 DIVERTICULITIS: ICD-10-CM

## 2024-04-16 DIAGNOSIS — K57.20 DIVERTICULITIS OF LARGE INTESTINE WITH ABSCESS WITHOUT BLEEDING: Primary | ICD-10-CM

## 2024-04-16 LAB
BASOPHILS # BLD AUTO: 0.09 10*3/MM3 (ref 0–0.2)
BASOPHILS NFR BLD AUTO: 0.9 % (ref 0–1.5)
CREAT SERPL-MCNC: 0.86 MG/DL (ref 0.57–1)
DEPRECATED RDW RBC AUTO: 46.6 FL (ref 37–54)
EGFRCR SERPLBLD CKD-EPI 2021: 77 ML/MIN/1.73
EOSINOPHIL # BLD AUTO: 0.12 10*3/MM3 (ref 0–0.4)
EOSINOPHIL NFR BLD AUTO: 1.2 % (ref 0.3–6.2)
ERYTHROCYTE [DISTWIDTH] IN BLOOD BY AUTOMATED COUNT: 13.8 % (ref 12.3–15.4)
HCT VFR BLD AUTO: 35.4 % (ref 34–46.6)
HGB BLD-MCNC: 11.2 G/DL (ref 12–15.9)
IMM GRANULOCYTES # BLD AUTO: 0.05 10*3/MM3 (ref 0–0.05)
IMM GRANULOCYTES NFR BLD AUTO: 0.5 % (ref 0–0.5)
LYMPHOCYTES # BLD AUTO: 1.28 10*3/MM3 (ref 0.7–3.1)
LYMPHOCYTES NFR BLD AUTO: 13.2 % (ref 19.6–45.3)
MCH RBC QN AUTO: 29.2 PG (ref 26.6–33)
MCHC RBC AUTO-ENTMCNC: 31.6 G/DL (ref 31.5–35.7)
MCV RBC AUTO: 92.2 FL (ref 79–97)
MONOCYTES # BLD AUTO: 1.02 10*3/MM3 (ref 0.1–0.9)
MONOCYTES NFR BLD AUTO: 10.5 % (ref 5–12)
NEUTROPHILS NFR BLD AUTO: 7.16 10*3/MM3 (ref 1.7–7)
NEUTROPHILS NFR BLD AUTO: 73.7 % (ref 42.7–76)
NRBC BLD AUTO-RTO: 0 /100 WBC (ref 0–0.2)
PLATELET # BLD AUTO: 470 10*3/MM3 (ref 140–450)
PMV BLD AUTO: 10 FL (ref 6–12)
RBC # BLD AUTO: 3.84 10*6/MM3 (ref 3.77–5.28)
WBC NRBC COR # BLD AUTO: 9.72 10*3/MM3 (ref 3.4–10.8)

## 2024-04-16 PROCEDURE — 3078F DIAST BP <80 MM HG: CPT | Performed by: FAMILY MEDICINE

## 2024-04-16 PROCEDURE — 85025 COMPLETE CBC W/AUTO DIFF WBC: CPT | Performed by: NURSE PRACTITIONER

## 2024-04-16 PROCEDURE — 82565 ASSAY OF CREATININE: CPT | Performed by: NURSE PRACTITIONER

## 2024-04-16 PROCEDURE — 99214 OFFICE O/P EST MOD 30 MIN: CPT | Performed by: FAMILY MEDICINE

## 2024-04-16 PROCEDURE — 36415 COLL VENOUS BLD VENIPUNCTURE: CPT

## 2024-04-16 PROCEDURE — 3074F SYST BP LT 130 MM HG: CPT | Performed by: FAMILY MEDICINE

## 2024-04-16 PROCEDURE — G0463 HOSPITAL OUTPT CLINIC VISIT: HCPCS

## 2024-04-16 NOTE — PROGRESS NOTES
Transitional Care Follow Up Visit  Subjective     Kirby Gunter is a 59 y.o. female who presents for a transitional care management visit.    Within 48 business hours after discharge our office contacted her via telephone to coordinate her care and needs.      I reviewed and discussed the details of that call along with the discharge summary, hospital problems, inpatient lab results, inpatient diagnostic studies, and consultation reports with Kirby.     Current outpatient and discharge medications have been reconciled for the patient.  Reviewed by: Chava López MD          4/9/2024     8:21 PM   Date of TCM Phone Call   [facility]   Date of Admission 4/3/2024   Date of Discharge 4/9/2024   [place]charge Disposition [place] [facility]     Risk for Readmission (LACE) Score: 9 (4/9/2024  6:00 AM)      History of Present Illness  She is in today for follow-up on recent hospital stay for diverticulitis and abdominal pain.  She had severe diverticulitis with some abscesses and is finishing up some IV Zosyn.  She is still feeling weak but is starting to get her appetite back.  She has an appointment to follow-up with gastroenterology next week and likely will need a surgical procedure to remove part of her sigmoid colon.  She has not had any fever since being home.  Her stools are loose but more normal by the day.  She has some nausea and food taste funny, that is likely from the antibiotic.  She has not had any difficulties urinating.  Sleep is adequate as long as she is in her recliner.  She has not seen any blood in her stool.     Course During Hospital Stay:  uneventful     The following portions of the patient's history were reviewed and updated as appropriate: allergies, current medications, past family history, past medical history, past social history, past surgical history, and problem list.    Review of Systems   Constitutional:  Negative for activity change, fatigue and fever.    HENT:  Negative for congestion, ear pain, postnasal drip, sinus pressure, sinus pain, sore throat and trouble swallowing.    Eyes:  Negative for visual disturbance.   Respiratory:  Negative for chest tightness, shortness of breath and wheezing.    Cardiovascular:  Negative for chest pain.   Gastrointestinal:  Positive for abdominal pain. Negative for abdominal distention, constipation, diarrhea, nausea and vomiting.   Genitourinary:  Negative for difficulty urinating and dysuria.   Musculoskeletal:  Negative for back pain and neck pain.   Skin:  Negative for rash.   Neurological:  Positive for headaches.   Psychiatric/Behavioral:  Negative for agitation, hallucinations and suicidal ideas.        Objective   Physical Exam  Vitals and nursing note reviewed.   Cardiovascular:      Rate and Rhythm: Normal rate and regular rhythm.      Heart sounds: Normal heart sounds.   Pulmonary:      Effort: Pulmonary effort is normal.      Breath sounds: No wheezing or rales.   Abdominal:      General: Bowel sounds are normal.      Palpations: Abdomen is soft.      Tenderness: There is no abdominal tenderness. There is no guarding.      Hernia: A hernia (Ventral) is present.      Comments: Soreness in the left lower quadrant area but not really tender   Musculoskeletal:      Cervical back: Neck supple.      Right lower leg: No edema.      Left lower leg: No edema.   Lymphadenopathy:      Cervical: No cervical adenopathy.   Neurological:      Mental Status: She is alert and oriented to person, place, and time. Mental status is at baseline.   Psychiatric:         Mood and Affect: Mood normal.         Assessment & Plan   Diagnoses and all orders for this visit:    1. Diverticulitis of large intestine with abscess without bleeding (Primary)      I did recommend that she remain off work for at least another 2 weeks  I did encourage her to continue her probiotics for at least another week  I advised her to follow the advice of her  gastroenterologist and if they recommend a surgical consultation that I second that  I did advise her to restart her allergy medication as she was concerned whether it would interfere with her antibiotic  I plan to see her back as scheduled in July, sooner if she has new concerns

## 2024-04-18 ENCOUNTER — HOSPITAL ENCOUNTER (OUTPATIENT)
Dept: INFUSION THERAPY | Facility: HOSPITAL | Age: 59
Discharge: HOME | End: 2024-04-18
Payer: MEDICAID

## 2024-04-18 VITALS
TEMPERATURE: 96.7 F | DIASTOLIC BLOOD PRESSURE: 60 MMHG | SYSTOLIC BLOOD PRESSURE: 94 MMHG | HEART RATE: 81 BPM | RESPIRATION RATE: 16 BRPM | OXYGEN SATURATION: 96 %

## 2024-04-18 DIAGNOSIS — K63.0 COLITIS WITH ABSCESS: Primary | ICD-10-CM

## 2024-04-18 DIAGNOSIS — K52.9 COLITIS WITH ABSCESS: Primary | ICD-10-CM

## 2024-04-18 PROCEDURE — G0463 HOSPITAL OUTPT CLINIC VISIT: HCPCS

## 2024-04-24 ENCOUNTER — OFFICE VISIT (OUTPATIENT)
Dept: SURGERY | Facility: CLINIC | Age: 59
End: 2024-04-24
Payer: MEDICAID

## 2024-04-24 VITALS
SYSTOLIC BLOOD PRESSURE: 96 MMHG | HEART RATE: 86 BPM | WEIGHT: 139 LBS | HEIGHT: 62 IN | OXYGEN SATURATION: 97 % | DIASTOLIC BLOOD PRESSURE: 66 MMHG | TEMPERATURE: 97.7 F | BODY MASS INDEX: 25.58 KG/M2

## 2024-04-24 DIAGNOSIS — K63.0 COLITIS WITH ABSCESS: Primary | ICD-10-CM

## 2024-04-24 DIAGNOSIS — K52.9 COLITIS WITH ABSCESS: Primary | ICD-10-CM

## 2024-04-24 PROCEDURE — 1160F RVW MEDS BY RX/DR IN RCRD: CPT | Performed by: STUDENT IN AN ORGANIZED HEALTH CARE EDUCATION/TRAINING PROGRAM

## 2024-04-24 PROCEDURE — 3074F SYST BP LT 130 MM HG: CPT | Performed by: STUDENT IN AN ORGANIZED HEALTH CARE EDUCATION/TRAINING PROGRAM

## 2024-04-24 PROCEDURE — 99214 OFFICE O/P EST MOD 30 MIN: CPT | Performed by: STUDENT IN AN ORGANIZED HEALTH CARE EDUCATION/TRAINING PROGRAM

## 2024-04-24 PROCEDURE — 1159F MED LIST DOCD IN RCRD: CPT | Performed by: STUDENT IN AN ORGANIZED HEALTH CARE EDUCATION/TRAINING PROGRAM

## 2024-04-24 PROCEDURE — 3078F DIAST BP <80 MM HG: CPT | Performed by: STUDENT IN AN ORGANIZED HEALTH CARE EDUCATION/TRAINING PROGRAM

## 2024-04-24 RX ORDER — SACCHAROMYCES BOULARDII 250 MG
250 CAPSULE ORAL 2 TIMES DAILY
COMMUNITY

## 2024-04-24 NOTE — PROGRESS NOTES
"Gonzalo Gunter is a 59 y.o. female.   Chief Complaint   Patient presents with    Abscess     Follow up hospital stay with colitis and abscess      BP 96/66 (BP Location: Left arm, Patient Position: Sitting, Cuff Size: Adult)   Pulse 86   Temp 97.7 °F (36.5 °C) (Infrared)   Ht 157.5 cm (62\")   Wt 63 kg (139 lb)   SpO2 97%   BMI 25.42 kg/m²     HISTORY OF PRESENT ILLNESS:  59-year-old woman who had seen in the hospital for diverticulitis with an abscess.  Was treated nonoperatively with antibiotics and discharged without incident.  Since then has been feeling better.  Tolerating a diet having bowel movements.  Has never had a colonoscopy.      Outpatient Encounter Medications as of 2024   Medication Sig Dispense Refill    albuterol sulfate  (90 Base) MCG/ACT inhaler Inhale 2 puffs Every 4 (Four) Hours As Needed for Wheezing. 8 g 0    amLODIPine (NORVASC) 5 MG tablet TAKE 1 TABLET BY MOUTH DAILY 90 tablet 1    levothyroxine (SYNTHROID, LEVOTHROID) 88 MCG tablet Take 1 tablet by mouth Daily. 90 tablet 1    lisinopril-hydrochlorothiazide (PRINZIDE,ZESTORETIC) 20-25 MG per tablet Take 1 tablet by mouth Daily. 90 tablet 1    saccharomyces boulardii (FLORASTOR) 250 MG capsule Take 1 capsule by mouth 2 (Two) Times a Day.      vitamin D (ERGOCALCIFEROL) 1.25 MG (86663 UT) capsule capsule Take 1 capsule by mouth 1 (One) Time Per Week. Sundays      [] polyethylene glycol (MIRALAX) 17 GM/SCOOP powder Take 17 g by mouth Daily for 14 days. 238 g 0     No facility-administered encounter medications on file as of 2024.     Past Medical History:   Diagnosis Date    Acute URI     Hypertension     Hypothyroidism     Overweight      Past Surgical History:   Procedure Laterality Date    DILATION AND CURETTAGE, DIAGNOSTIC / THERAPEUTIC  1992    miscarriage     Family History   Problem Relation Age of Onset    Hypertension Mother     Aneurysm Mother     Arthritis Mother     Hypertension Sister  "    Aneurysm Brother        Social History     Tobacco Use    Smoking status: Former     Current packs/day: 0.00     Average packs/day: 0.5 packs/day for 41.4 years (20.7 ttl pk-yrs)     Types: Cigarettes     Start date: 1982     Quit date: 2023     Years since quittin.9     Passive exposure: Past    Smokeless tobacco: Never   Vaping Use    Vaping status: Never Used   Substance Use Topics    Alcohol use: Yes     Comment: SOCIAL    Drug use: Not Currently     Patient has no known allergies.    Review of Systems   Gastrointestinal:  Negative for abdominal pain.       Objective     Physical Exam  Cardiovascular:      Rate and Rhythm: Normal rate.   Pulmonary:      Effort: Pulmonary effort is normal.   Abdominal:      General: Abdomen is flat.      Palpations: Abdomen is soft.   Neurological:      Mental Status: She is alert.           Assessment & Plan   Diagnoses and all orders for this visit:    1. Colitis with abscess (Primary)    He is doing well since discharge.  Given her diverticulitis with an abscess I believe she would benefit from sigmoid colectomy.  She will need a colonoscopy prior to this.  Will refer to my partner Dr. Barron for robotic colectomy evaluation.    Kojo Barnett MD  2024  9:09 AM EDT

## 2024-04-26 ENCOUNTER — TELEPHONE (OUTPATIENT)
Dept: FAMILY MEDICINE CLINIC | Facility: CLINIC | Age: 59
End: 2024-04-26
Payer: MEDICAID

## 2024-04-26 NOTE — TELEPHONE ENCOUNTER
Caller: Kirby Gunter    Relationship: Self    Best call back number: [phone]     PATIENT NEEDS A NOTE FOR HER EMPLOYER STATING SHE WAS UNDER MEDICAL CARE FROM 4/3/24 TIL CURRENT DATE FOR AN ABSCESS ON HER COLON.    PATIENT WAS HOSPITALIZED FROM 4/3/24-4/9/24 AND THEN HAS BEEN UNDER DIFFERENT DOCTORS CARE SINCE. HER GASTROENTEROLOGIST, KALEE MCNAMARA, SHE CAME TO SEE DR EVE SANTSO 4/16/24, ETC.    DR MCNAMARA GAVE HER, HER RELEASE TO GO BACK TO WORK.  SHE JUST NEEDS IT IN WRITING THAT SHE HAS BEEN UNDER DOCTORS CARE SINCE 4/3/24 (HOSPITAL ADMISSION TO CURRENT DATE)       SHE CAN COME BY THE OFFICE AND PICK THIS UP AS SOON AS IT IS READY.      PLEASE ADVISE

## 2024-04-26 NOTE — TELEPHONE ENCOUNTER
Voicemail not setup yet    Letter/Note ready for  at .  Monday - Friday 8am to 4:30pm    HUB TO RELAY     Ill sent a note through [facility] too.

## 2024-05-13 ENCOUNTER — TELEPHONE (OUTPATIENT)
Dept: FAMILY MEDICINE CLINIC | Facility: CLINIC | Age: 59
End: 2024-05-13

## 2024-05-13 DIAGNOSIS — Z12.11 SCREENING FOR COLON CANCER: Primary | ICD-10-CM

## 2024-05-13 NOTE — TELEPHONE ENCOUNTER
Caller: Kirby Gunter    Relationship: Self    Best call back number: [phone]    What orders are you requesting (i.e. lab or imaging): ORDER FOR COLONOSCOPY     Where will you receive your lab/imaging services: [facility]

## 2024-05-22 ENCOUNTER — TELEPHONE (OUTPATIENT)
Dept: FAMILY MEDICINE CLINIC | Facility: CLINIC | Age: 59
End: 2024-05-22
Payer: MEDICAID

## 2024-05-22 ENCOUNTER — PATIENT MESSAGE (OUTPATIENT)
Dept: FAMILY MEDICINE CLINIC | Facility: CLINIC | Age: 59
End: 2024-05-22

## 2024-05-22 NOTE — TELEPHONE ENCOUNTER
Caller: Kirby Gunter    Relationship: Self    Best call back number: [phone]    What is the best time to reach you: anytime    Who are you requesting to speak with (clinical staff, provider,  specific staff member): CLINICAL STAFF    Do you know the name of the person who called: PATIENT     What was the call regarding: PATIENT SAID SHE HAS STRUGGLED TO SCHEDULE HER COLONOSCOPY AND SAID [facility] IS TELLING HER THEY CANNOT SCHEDULE, SHE HAS TO GO THROUGH HER DOCTOR     Is it okay if the provider responds through MyChart: NO

## 2024-05-22 NOTE — TELEPHONE ENCOUNTER
I am behind on referrals and it was just fax to [facility] on 5/20. They are a month behind just on faxes so I'm sure that is the problem. The only other places is [facility] and not sure how backed up they are

## 2024-07-30 ENCOUNTER — LAB (OUTPATIENT)
Dept: FAMILY MEDICINE CLINIC | Facility: CLINIC | Age: 59
End: 2024-07-30
Payer: MEDICAID

## 2024-07-30 ENCOUNTER — OFFICE VISIT (OUTPATIENT)
Dept: FAMILY MEDICINE CLINIC | Facility: CLINIC | Age: 59
End: 2024-07-30
Payer: MEDICAID

## 2024-07-30 VITALS
SYSTOLIC BLOOD PRESSURE: 123 MMHG | BODY MASS INDEX: 26.87 KG/M2 | HEART RATE: 80 BPM | DIASTOLIC BLOOD PRESSURE: 79 MMHG | OXYGEN SATURATION: 96 % | WEIGHT: 146 LBS | HEIGHT: 62 IN

## 2024-07-30 DIAGNOSIS — E03.9 ACQUIRED HYPOTHYROIDISM: ICD-10-CM

## 2024-07-30 DIAGNOSIS — R10.13 CHRONIC EPIGASTRIC PAIN: Primary | ICD-10-CM

## 2024-07-30 DIAGNOSIS — G89.29 CHRONIC EPIGASTRIC PAIN: Primary | ICD-10-CM

## 2024-07-30 DIAGNOSIS — I10 ESSENTIAL HYPERTENSION: ICD-10-CM

## 2024-07-30 DIAGNOSIS — Z12.31 OTHER SCREENING MAMMOGRAM: ICD-10-CM

## 2024-07-30 LAB
ALBUMIN SERPL-MCNC: 4.4 G/DL (ref 3.5–5.2)
ALBUMIN/GLOB SERPL: 1.6 G/DL
ALP SERPL-CCNC: 54 U/L (ref 39–117)
ALT SERPL W P-5'-P-CCNC: 14 U/L (ref 1–33)
ANION GAP SERPL CALCULATED.3IONS-SCNC: 11 MMOL/L (ref 5–15)
AST SERPL-CCNC: 18 U/L (ref 1–32)
BASOPHILS # BLD AUTO: 0.06 10*3/MM3 (ref 0–0.2)
BASOPHILS NFR BLD AUTO: 0.6 % (ref 0–1.5)
BILIRUB SERPL-MCNC: 0.3 MG/DL (ref 0–1.2)
BUN SERPL-MCNC: 13 MG/DL (ref 8–23)
BUN/CREAT SERPL: 18.1 (ref 7–25)
CALCIUM SPEC-SCNC: 9.7 MG/DL (ref 8.6–10.5)
CHLORIDE SERPL-SCNC: 102 MMOL/L (ref 98–107)
CHOLEST SERPL-MCNC: 167 MG/DL (ref 0–200)
CO2 SERPL-SCNC: 27 MMOL/L (ref 22–29)
CREAT SERPL-MCNC: 0.72 MG/DL (ref 0.57–1)
DEPRECATED RDW RBC AUTO: 39.6 FL (ref 37–54)
EGFRCR SERPLBLD CKD-EPI 2021: 95.3 ML/MIN/1.73
EOSINOPHIL # BLD AUTO: 0.11 10*3/MM3 (ref 0–0.4)
EOSINOPHIL NFR BLD AUTO: 1.2 % (ref 0.3–6.2)
ERYTHROCYTE [DISTWIDTH] IN BLOOD BY AUTOMATED COUNT: 12 % (ref 12.3–15.4)
GLOBULIN UR ELPH-MCNC: 2.7 GM/DL
GLUCOSE SERPL-MCNC: 106 MG/DL (ref 65–99)
HCT VFR BLD AUTO: 39.8 % (ref 34–46.6)
HDLC SERPL-MCNC: 75 MG/DL (ref 40–60)
HGB BLD-MCNC: 12.7 G/DL (ref 12–15.9)
IMM GRANULOCYTES # BLD AUTO: 0.02 10*3/MM3 (ref 0–0.05)
IMM GRANULOCYTES NFR BLD AUTO: 0.2 % (ref 0–0.5)
LDLC SERPL CALC-MCNC: 79 MG/DL (ref 0–100)
LDLC/HDLC SERPL: 1.05 {RATIO}
LYMPHOCYTES # BLD AUTO: 2.01 10*3/MM3 (ref 0.7–3.1)
LYMPHOCYTES NFR BLD AUTO: 21.7 % (ref 19.6–45.3)
MCH RBC QN AUTO: 29.1 PG (ref 26.6–33)
MCHC RBC AUTO-ENTMCNC: 31.9 G/DL (ref 31.5–35.7)
MCV RBC AUTO: 91.3 FL (ref 79–97)
MONOCYTES # BLD AUTO: 0.68 10*3/MM3 (ref 0.1–0.9)
MONOCYTES NFR BLD AUTO: 7.3 % (ref 5–12)
NEUTROPHILS NFR BLD AUTO: 6.38 10*3/MM3 (ref 1.7–7)
NEUTROPHILS NFR BLD AUTO: 69 % (ref 42.7–76)
NRBC BLD AUTO-RTO: 0 /100 WBC (ref 0–0.2)
PLATELET # BLD AUTO: 273 10*3/MM3 (ref 140–450)
PMV BLD AUTO: 11.5 FL (ref 6–12)
POTASSIUM SERPL-SCNC: 4.2 MMOL/L (ref 3.5–5.2)
PROT SERPL-MCNC: 7.1 G/DL (ref 6–8.5)
RBC # BLD AUTO: 4.36 10*6/MM3 (ref 3.77–5.28)
SODIUM SERPL-SCNC: 140 MMOL/L (ref 136–145)
T3FREE SERPL-MCNC: 2.8 PG/ML (ref 2–4.4)
T4 FREE SERPL-MCNC: 1.7 NG/DL (ref 0.92–1.68)
TRIGL SERPL-MCNC: 68 MG/DL (ref 0–150)
TSH SERPL DL<=0.05 MIU/L-ACNC: 0.38 UIU/ML (ref 0.27–4.2)
VLDLC SERPL-MCNC: 13 MG/DL (ref 5–40)
WBC NRBC COR # BLD AUTO: 9.26 10*3/MM3 (ref 3.4–10.8)

## 2024-07-30 PROCEDURE — 84481 FREE ASSAY (FT-3): CPT | Performed by: FAMILY MEDICINE

## 2024-07-30 PROCEDURE — 84439 ASSAY OF FREE THYROXINE: CPT | Performed by: FAMILY MEDICINE

## 2024-07-30 PROCEDURE — 3074F SYST BP LT 130 MM HG: CPT | Performed by: FAMILY MEDICINE

## 2024-07-30 PROCEDURE — 80061 LIPID PANEL: CPT | Performed by: FAMILY MEDICINE

## 2024-07-30 PROCEDURE — 99214 OFFICE O/P EST MOD 30 MIN: CPT | Performed by: FAMILY MEDICINE

## 2024-07-30 PROCEDURE — 80050 GENERAL HEALTH PANEL: CPT | Performed by: FAMILY MEDICINE

## 2024-07-30 PROCEDURE — 3078F DIAST BP <80 MM HG: CPT | Performed by: FAMILY MEDICINE

## 2024-07-30 PROCEDURE — 36415 COLL VENOUS BLD VENIPUNCTURE: CPT | Performed by: FAMILY MEDICINE

## 2024-07-30 RX ORDER — ERGOCALCIFEROL 1.25 MG/1
50000 CAPSULE ORAL WEEKLY
Qty: 12 CAPSULE | Refills: 1 | Status: SHIPPED | OUTPATIENT
Start: 2024-07-30

## 2024-07-30 RX ORDER — LISINOPRIL AND HYDROCHLOROTHIAZIDE 25; 20 MG/1; MG/1
1 TABLET ORAL DAILY
Qty: 90 TABLET | Refills: 1 | Status: SHIPPED | OUTPATIENT
Start: 2024-07-30

## 2024-07-30 RX ORDER — PANTOPRAZOLE SODIUM 40 MG/1
40 TABLET, DELAYED RELEASE ORAL DAILY
Qty: 30 TABLET | Refills: 5 | Status: SHIPPED | OUTPATIENT
Start: 2024-07-30

## 2024-07-30 NOTE — PROGRESS NOTES
"Subjective   Kirby Gunter is a 59 y.o. female.     History of Present Illness  Kirby Gunter is in for some lingering upper GI bloating and discomfort. Her bowels are a bit irregular as well, but she has not seen any blood. She has a colonoscopy planned for about 2 wks from now.  There is no history of chest pain or dyspnea. There is no history of issue with bladder dysfunction. There is no history of dizziness or lightheadedness. There is no history of issue with sleep or mood. There is no history of issue with present medication. She has chronic high blood pressure and hypothyroidism and is due for labs to monitor those while here today.      GI Problem  The primary symptoms include abdominal pain. Primary symptoms do not include fever, fatigue, nausea, vomiting, diarrhea, dysuria or rash.   The illness does not include constipation or back pain.          /79 (BP Location: Left arm, Patient Position: Sitting, Cuff Size: Adult)   Pulse 80   Ht 157.5 cm (62.01\")   Wt 66.2 kg (146 lb)   SpO2 96%   BMI 26.70 kg/m²       Chief Complaint   Patient presents with    GI Problem     2 month f/u            Current Outpatient Medications:     albuterol sulfate  (90 Base) MCG/ACT inhaler, Inhale 2 puffs Every 4 (Four) Hours As Needed for Wheezing., Disp: 8 g, Rfl: 0    amLODIPine (NORVASC) 5 MG tablet, TAKE 1 TABLET BY MOUTH DAILY, Disp: 90 tablet, Rfl: 1    levothyroxine (SYNTHROID, LEVOTHROID) 88 MCG tablet, Take 1 tablet by mouth Daily., Disp: 90 tablet, Rfl: 1    lisinopril-hydrochlorothiazide (PRINZIDE,ZESTORETIC) 20-25 MG per tablet, Take 1 tablet by mouth Daily., Disp: 90 tablet, Rfl: 1    saccharomyces boulardii (FLORASTOR) 250 MG capsule, Take 1 capsule by mouth 2 (Two) Times a Day., Disp: , Rfl:     vitamin D (ERGOCALCIFEROL) 1.25 MG (37953 UT) capsule capsule, Take 1 capsule by mouth 1 (One) Time Per Week. Sundays, Disp: 12 capsule, Rfl: 1    pantoprazole (PROTONIX) 40 MG EC tablet, Take 1 " tablet by mouth Daily., Disp: 30 tablet, Rfl: 5            The following portions of the patient's history were reviewed and updated as appropriate: allergies, current medications, past family history, past medical history, past social history, past surgical history, and problem list.    Review of Systems   Constitutional:  Negative for activity change, fatigue and fever.   HENT:  Negative for congestion, ear pain, postnasal drip, sinus pressure, sinus pain, sore throat and trouble swallowing.    Eyes:  Negative for visual disturbance.   Respiratory:  Negative for chest tightness, shortness of breath and wheezing.    Cardiovascular:  Negative for chest pain.   Gastrointestinal:  Positive for abdominal pain. Negative for abdominal distention, constipation, diarrhea, nausea and vomiting.   Genitourinary:  Negative for difficulty urinating and dysuria.   Musculoskeletal:  Negative for back pain and neck pain.   Skin:  Negative for rash.   Neurological:  Positive for headaches.   Psychiatric/Behavioral:  Negative for agitation, hallucinations and suicidal ideas.        Objective   Physical Exam  Vitals and nursing note reviewed.   Constitutional:       Appearance: Normal appearance.   Cardiovascular:      Rate and Rhythm: Normal rate and regular rhythm.      Heart sounds: Normal heart sounds. No murmur heard.  Pulmonary:      Effort: Pulmonary effort is normal.      Breath sounds: No wheezing or rales.   Abdominal:      General: Bowel sounds are normal.      Palpations: Abdomen is soft.      Tenderness: There is no abdominal tenderness. There is no guarding or rebound.   Musculoskeletal:      Cervical back: Neck supple.      Right lower leg: No edema.      Left lower leg: No edema.   Lymphadenopathy:      Cervical: No cervical adenopathy.   Neurological:      General: No focal deficit present.      Mental Status: She is alert and oriented to person, place, and time.           Assessment & Plan   Problems Addressed this  Visit          Cardiac and Vasculature    Essential hypertension    Relevant Medications    lisinopril-hydrochlorothiazide (PRINZIDE,ZESTORETIC) 20-25 MG per tablet    Other Relevant Orders    Comprehensive metabolic panel (Completed)    Lipid panel (Completed)       Endocrine and Metabolic    Hypothyroidism    Relevant Orders    T3, free (Completed)    T4, free (Completed)    TSH (Completed)    CBC w AUTO Differential (Completed)     Other Visit Diagnoses       Chronic epigastric pain    -  Primary    Relevant Orders    Ambulatory referral for Screening EGD (Completed)    Other screening mammogram        Relevant Orders    Mammo Screening Digital Tomosynthesis Bilateral With CAD          Diagnoses         Codes Comments    Chronic epigastric pain    -  Primary ICD-10-CM: R10.13, G89.29  ICD-9-CM: 789.06, 338.29     Acquired hypothyroidism     ICD-10-CM: E03.9  ICD-9-CM: 244.9     Essential hypertension     ICD-10-CM: I10  ICD-9-CM: 401.9     Other screening mammogram     ICD-10-CM: Z12.31  ICD-9-CM: V76.12           I will update labs  I am going to order an EGD and see if it can be done with her upcoming colonoscopy  I have sent some pantoprazole and have asked her to reduce her caffeine  I will see back for follow up and she is to call for new concerns

## 2024-07-31 ENCOUNTER — PATIENT MESSAGE (OUTPATIENT)
Dept: FAMILY MEDICINE CLINIC | Facility: CLINIC | Age: 59
End: 2024-07-31
Payer: MEDICAID

## 2024-08-12 ENCOUNTER — ON CAMPUS - OUTPATIENT (AMBULATORY)
Dept: URBAN - METROPOLITAN AREA HOSPITAL 2 | Facility: HOSPITAL | Age: 59
End: 2024-08-12
Payer: COMMERCIAL

## 2024-08-12 ENCOUNTER — OFFICE (AMBULATORY)
Dept: URBAN - METROPOLITAN AREA PATHOLOGY 19 | Facility: PATHOLOGY | Age: 59
End: 2024-08-12
Payer: COMMERCIAL

## 2024-08-12 VITALS
DIASTOLIC BLOOD PRESSURE: 73 MMHG | SYSTOLIC BLOOD PRESSURE: 129 MMHG | HEART RATE: 87 BPM | HEART RATE: 75 BPM | OXYGEN SATURATION: 96 % | HEART RATE: 93 BPM | SYSTOLIC BLOOD PRESSURE: 114 MMHG | HEART RATE: 88 BPM | DIASTOLIC BLOOD PRESSURE: 78 MMHG | HEART RATE: 90 BPM | OXYGEN SATURATION: 97 % | HEART RATE: 84 BPM | DIASTOLIC BLOOD PRESSURE: 85 MMHG | RESPIRATION RATE: 14 BRPM | HEART RATE: 99 BPM | SYSTOLIC BLOOD PRESSURE: 102 MMHG | RESPIRATION RATE: 16 BRPM | DIASTOLIC BLOOD PRESSURE: 84 MMHG | SYSTOLIC BLOOD PRESSURE: 128 MMHG | WEIGHT: 145 LBS | OXYGEN SATURATION: 98 % | OXYGEN SATURATION: 95 % | DIASTOLIC BLOOD PRESSURE: 66 MMHG | SYSTOLIC BLOOD PRESSURE: 127 MMHG | SYSTOLIC BLOOD PRESSURE: 107 MMHG | SYSTOLIC BLOOD PRESSURE: 142 MMHG | DIASTOLIC BLOOD PRESSURE: 80 MMHG | DIASTOLIC BLOOD PRESSURE: 72 MMHG | RESPIRATION RATE: 20 BRPM | SYSTOLIC BLOOD PRESSURE: 126 MMHG | SYSTOLIC BLOOD PRESSURE: 117 MMHG | HEART RATE: 89 BPM | TEMPERATURE: 97.5 F | RESPIRATION RATE: 18 BRPM | RESPIRATION RATE: 13 BRPM | HEIGHT: 62 IN | OXYGEN SATURATION: 99 %

## 2024-08-12 DIAGNOSIS — K57.30 DIVERTICULOSIS OF LARGE INTESTINE WITHOUT PERFORATION OR ABS: ICD-10-CM

## 2024-08-12 DIAGNOSIS — D12.2 BENIGN NEOPLASM OF ASCENDING COLON: ICD-10-CM

## 2024-08-12 DIAGNOSIS — R93.3 ABNORMAL FINDINGS ON DIAGNOSTIC IMAGING OF OTHER PARTS OF DI: ICD-10-CM

## 2024-08-12 DIAGNOSIS — K64.0 FIRST DEGREE HEMORRHOIDS: ICD-10-CM

## 2024-08-12 PROBLEM — K63.5 POLYP OF COLON: Status: ACTIVE | Noted: 2024-08-12

## 2024-08-12 LAB
GI HISTOLOGY: A. ASCENDING COLON: (no result)
GI HISTOLOGY: PDF REPORT: (no result)

## 2024-08-12 PROCEDURE — 45385 COLONOSCOPY W/LESION REMOVAL: CPT | Performed by: INTERNAL MEDICINE

## 2024-08-12 PROCEDURE — 88305 TISSUE EXAM BY PATHOLOGIST: CPT | Performed by: PATHOLOGY

## 2024-08-17 ENCOUNTER — HOSPITAL ENCOUNTER (OUTPATIENT)
Dept: MAMMOGRAPHY | Facility: HOSPITAL | Age: 59
Discharge: HOME | End: 2024-08-17
Admitting: FAMILY MEDICINE
Payer: MEDICAID

## 2024-08-17 DIAGNOSIS — Z12.31 OTHER SCREENING MAMMOGRAM: ICD-10-CM

## 2024-08-17 PROCEDURE — 77067 SCR MAMMO BI INCL CAD: CPT

## 2024-08-17 PROCEDURE — 77063 BREAST TOMOSYNTHESIS BI: CPT

## 2024-08-26 RX ORDER — AMLODIPINE BESYLATE 5 MG/1
5 TABLET ORAL DAILY
Qty: 90 TABLET | Refills: 1 | Status: SHIPPED | OUTPATIENT
Start: 2024-08-26

## 2024-10-23 ENCOUNTER — OFFICE (AMBULATORY)
Age: 59
End: 2024-10-23
Payer: COMMERCIAL

## 2024-10-23 ENCOUNTER — OFFICE (AMBULATORY)
Dept: URBAN - METROPOLITAN AREA CLINIC 64 | Facility: CLINIC | Age: 59
End: 2024-10-23
Payer: COMMERCIAL

## 2024-10-23 VITALS
HEART RATE: 69 BPM | HEART RATE: 69 BPM | HEIGHT: 62 IN | DIASTOLIC BLOOD PRESSURE: 71 MMHG | WEIGHT: 153 LBS | HEIGHT: 62 IN | WEIGHT: 153 LBS | DIASTOLIC BLOOD PRESSURE: 71 MMHG | DIASTOLIC BLOOD PRESSURE: 71 MMHG | DIASTOLIC BLOOD PRESSURE: 71 MMHG | SYSTOLIC BLOOD PRESSURE: 125 MMHG | HEART RATE: 69 BPM | WEIGHT: 153 LBS | SYSTOLIC BLOOD PRESSURE: 125 MMHG | WEIGHT: 153 LBS | SYSTOLIC BLOOD PRESSURE: 125 MMHG | DIASTOLIC BLOOD PRESSURE: 71 MMHG | SYSTOLIC BLOOD PRESSURE: 125 MMHG | DIASTOLIC BLOOD PRESSURE: 71 MMHG | WEIGHT: 153 LBS | WEIGHT: 153 LBS | HEIGHT: 62 IN | HEIGHT: 62 IN | HEART RATE: 69 BPM | HEIGHT: 62 IN | DIASTOLIC BLOOD PRESSURE: 71 MMHG | HEIGHT: 62 IN | HEART RATE: 69 BPM | SYSTOLIC BLOOD PRESSURE: 125 MMHG | SYSTOLIC BLOOD PRESSURE: 125 MMHG | SYSTOLIC BLOOD PRESSURE: 125 MMHG | HEIGHT: 62 IN | HEART RATE: 69 BPM | WEIGHT: 153 LBS | HEART RATE: 69 BPM

## 2024-10-23 DIAGNOSIS — K30 FUNCTIONAL DYSPEPSIA: ICD-10-CM

## 2024-10-23 DIAGNOSIS — R13.10 DYSPHAGIA, UNSPECIFIED: ICD-10-CM

## 2024-10-23 DIAGNOSIS — R15.0 INCOMPLETE DEFECATION: ICD-10-CM

## 2024-10-23 DIAGNOSIS — R14.0 ABDOMINAL DISTENSION (GASEOUS): ICD-10-CM

## 2024-10-23 PROCEDURE — 99213 OFFICE O/P EST LOW 20 MIN: CPT

## 2024-10-27 DIAGNOSIS — E03.9 ACQUIRED HYPOTHYROIDISM: ICD-10-CM

## 2024-10-28 ENCOUNTER — TELEPHONE (OUTPATIENT)
Dept: FAMILY MEDICINE CLINIC | Facility: CLINIC | Age: 59
End: 2024-10-28
Payer: MEDICAID

## 2024-10-28 RX ORDER — LEVOTHYROXINE SODIUM 88 UG/1
88 TABLET ORAL DAILY
Qty: 90 TABLET | Refills: 1 | Status: SHIPPED | OUTPATIENT
Start: 2024-10-28

## 2024-10-28 NOTE — TELEPHONE ENCOUNTER
Pantoprazole Sodium 40MG dr tablets HOWARD watters. Goodrx coupon faxed to pharmacy. $8.23  (Key: [id number])  PA Case ID #: [id number]  Rx #: [id number]

## 2024-11-08 ENCOUNTER — ON CAMPUS - OUTPATIENT (AMBULATORY)
Age: 59
End: 2024-11-08
Payer: COMMERCIAL

## 2024-11-08 ENCOUNTER — OFFICE (AMBULATORY)
Age: 59
End: 2024-11-08
Payer: COMMERCIAL

## 2024-11-08 ENCOUNTER — ON CAMPUS - OUTPATIENT (AMBULATORY)
Dept: URBAN - METROPOLITAN AREA HOSPITAL 2 | Facility: HOSPITAL | Age: 59
End: 2024-11-08
Payer: COMMERCIAL

## 2024-11-08 ENCOUNTER — OFFICE (AMBULATORY)
Dept: URBAN - METROPOLITAN AREA PATHOLOGY 19 | Facility: PATHOLOGY | Age: 59
End: 2024-11-08
Payer: COMMERCIAL

## 2024-11-08 VITALS
HEIGHT: 62 IN | DIASTOLIC BLOOD PRESSURE: 62 MMHG | OXYGEN SATURATION: 100 % | DIASTOLIC BLOOD PRESSURE: 50 MMHG | SYSTOLIC BLOOD PRESSURE: 123 MMHG | HEIGHT: 62 IN | SYSTOLIC BLOOD PRESSURE: 138 MMHG | HEART RATE: 84 BPM | HEART RATE: 84 BPM | SYSTOLIC BLOOD PRESSURE: 86 MMHG | RESPIRATION RATE: 21 BRPM | HEART RATE: 75 BPM | DIASTOLIC BLOOD PRESSURE: 79 MMHG | SYSTOLIC BLOOD PRESSURE: 85 MMHG | DIASTOLIC BLOOD PRESSURE: 53 MMHG | SYSTOLIC BLOOD PRESSURE: 90 MMHG | RESPIRATION RATE: 15 BRPM | HEIGHT: 62 IN | SYSTOLIC BLOOD PRESSURE: 85 MMHG | HEART RATE: 83 BPM | OXYGEN SATURATION: 100 % | DIASTOLIC BLOOD PRESSURE: 56 MMHG | HEART RATE: 75 BPM | RESPIRATION RATE: 21 BRPM | SYSTOLIC BLOOD PRESSURE: 123 MMHG | HEART RATE: 85 BPM | SYSTOLIC BLOOD PRESSURE: 115 MMHG | SYSTOLIC BLOOD PRESSURE: 85 MMHG | DIASTOLIC BLOOD PRESSURE: 62 MMHG | HEART RATE: 88 BPM | HEIGHT: 62 IN | SYSTOLIC BLOOD PRESSURE: 90 MMHG | DIASTOLIC BLOOD PRESSURE: 75 MMHG | DIASTOLIC BLOOD PRESSURE: 56 MMHG | SYSTOLIC BLOOD PRESSURE: 93 MMHG | OXYGEN SATURATION: 99 % | WEIGHT: 153 LBS | DIASTOLIC BLOOD PRESSURE: 62 MMHG | HEART RATE: 85 BPM | OXYGEN SATURATION: 93 % | SYSTOLIC BLOOD PRESSURE: 93 MMHG | TEMPERATURE: 97.8 F | SYSTOLIC BLOOD PRESSURE: 85 MMHG | TEMPERATURE: 97.8 F | HEART RATE: 66 BPM | DIASTOLIC BLOOD PRESSURE: 54 MMHG | DIASTOLIC BLOOD PRESSURE: 75 MMHG | OXYGEN SATURATION: 99 % | HEART RATE: 75 BPM | HEART RATE: 75 BPM | SYSTOLIC BLOOD PRESSURE: 86 MMHG | HEART RATE: 84 BPM | DIASTOLIC BLOOD PRESSURE: 75 MMHG | DIASTOLIC BLOOD PRESSURE: 56 MMHG | OXYGEN SATURATION: 96 % | SYSTOLIC BLOOD PRESSURE: 85 MMHG | RESPIRATION RATE: 15 BRPM | HEART RATE: 83 BPM | TEMPERATURE: 97.8 F | SYSTOLIC BLOOD PRESSURE: 86 MMHG | OXYGEN SATURATION: 93 % | RESPIRATION RATE: 16 BRPM | SYSTOLIC BLOOD PRESSURE: 85 MMHG | OXYGEN SATURATION: 96 % | DIASTOLIC BLOOD PRESSURE: 61 MMHG | SYSTOLIC BLOOD PRESSURE: 89 MMHG | HEART RATE: 66 BPM | DIASTOLIC BLOOD PRESSURE: 61 MMHG | HEART RATE: 83 BPM | TEMPERATURE: 97.8 F | RESPIRATION RATE: 16 BRPM | WEIGHT: 153 LBS | OXYGEN SATURATION: 99 % | DIASTOLIC BLOOD PRESSURE: 53 MMHG | RESPIRATION RATE: 15 BRPM | HEART RATE: 83 BPM | HEART RATE: 66 BPM | DIASTOLIC BLOOD PRESSURE: 54 MMHG | DIASTOLIC BLOOD PRESSURE: 56 MMHG | SYSTOLIC BLOOD PRESSURE: 123 MMHG | HEART RATE: 84 BPM | DIASTOLIC BLOOD PRESSURE: 50 MMHG | HEART RATE: 88 BPM | HEART RATE: 85 BPM | TEMPERATURE: 97.8 F | OXYGEN SATURATION: 96 % | RESPIRATION RATE: 18 BRPM | HEART RATE: 66 BPM | HEART RATE: 75 BPM | SYSTOLIC BLOOD PRESSURE: 89 MMHG | DIASTOLIC BLOOD PRESSURE: 75 MMHG | SYSTOLIC BLOOD PRESSURE: 90 MMHG | OXYGEN SATURATION: 100 % | DIASTOLIC BLOOD PRESSURE: 61 MMHG | HEART RATE: 85 BPM | HEART RATE: 88 BPM | RESPIRATION RATE: 21 BRPM | SYSTOLIC BLOOD PRESSURE: 89 MMHG | OXYGEN SATURATION: 99 % | SYSTOLIC BLOOD PRESSURE: 138 MMHG | WEIGHT: 153 LBS | RESPIRATION RATE: 21 BRPM | DIASTOLIC BLOOD PRESSURE: 79 MMHG | HEART RATE: 75 BPM | HEART RATE: 84 BPM | OXYGEN SATURATION: 100 % | HEART RATE: 66 BPM | SYSTOLIC BLOOD PRESSURE: 123 MMHG | SYSTOLIC BLOOD PRESSURE: 115 MMHG | SYSTOLIC BLOOD PRESSURE: 138 MMHG | SYSTOLIC BLOOD PRESSURE: 89 MMHG | DIASTOLIC BLOOD PRESSURE: 53 MMHG | HEART RATE: 85 BPM | HEART RATE: 84 BPM | RESPIRATION RATE: 16 BRPM | DIASTOLIC BLOOD PRESSURE: 79 MMHG | RESPIRATION RATE: 18 BRPM | SYSTOLIC BLOOD PRESSURE: 93 MMHG | SYSTOLIC BLOOD PRESSURE: 93 MMHG | RESPIRATION RATE: 21 BRPM | OXYGEN SATURATION: 100 % | HEART RATE: 85 BPM | DIASTOLIC BLOOD PRESSURE: 75 MMHG | SYSTOLIC BLOOD PRESSURE: 138 MMHG | SYSTOLIC BLOOD PRESSURE: 115 MMHG | OXYGEN SATURATION: 93 % | SYSTOLIC BLOOD PRESSURE: 90 MMHG | HEART RATE: 83 BPM | OXYGEN SATURATION: 96 % | DIASTOLIC BLOOD PRESSURE: 62 MMHG | RESPIRATION RATE: 18 BRPM | RESPIRATION RATE: 16 BRPM | DIASTOLIC BLOOD PRESSURE: 75 MMHG | TEMPERATURE: 97.8 F | RESPIRATION RATE: 18 BRPM | OXYGEN SATURATION: 99 % | HEART RATE: 84 BPM | SYSTOLIC BLOOD PRESSURE: 138 MMHG | RESPIRATION RATE: 15 BRPM | SYSTOLIC BLOOD PRESSURE: 115 MMHG | HEART RATE: 88 BPM | DIASTOLIC BLOOD PRESSURE: 61 MMHG | SYSTOLIC BLOOD PRESSURE: 90 MMHG | SYSTOLIC BLOOD PRESSURE: 86 MMHG | SYSTOLIC BLOOD PRESSURE: 123 MMHG | DIASTOLIC BLOOD PRESSURE: 53 MMHG | RESPIRATION RATE: 16 BRPM | DIASTOLIC BLOOD PRESSURE: 56 MMHG | DIASTOLIC BLOOD PRESSURE: 53 MMHG | OXYGEN SATURATION: 93 % | SYSTOLIC BLOOD PRESSURE: 90 MMHG | SYSTOLIC BLOOD PRESSURE: 93 MMHG | TEMPERATURE: 97.8 F | SYSTOLIC BLOOD PRESSURE: 86 MMHG | DIASTOLIC BLOOD PRESSURE: 56 MMHG | OXYGEN SATURATION: 100 % | SYSTOLIC BLOOD PRESSURE: 89 MMHG | HEIGHT: 62 IN | SYSTOLIC BLOOD PRESSURE: 138 MMHG | DIASTOLIC BLOOD PRESSURE: 56 MMHG | RESPIRATION RATE: 15 BRPM | SYSTOLIC BLOOD PRESSURE: 93 MMHG | WEIGHT: 153 LBS | SYSTOLIC BLOOD PRESSURE: 86 MMHG | OXYGEN SATURATION: 100 % | DIASTOLIC BLOOD PRESSURE: 79 MMHG | SYSTOLIC BLOOD PRESSURE: 89 MMHG | OXYGEN SATURATION: 93 % | WEIGHT: 153 LBS | HEIGHT: 62 IN | OXYGEN SATURATION: 96 % | HEART RATE: 66 BPM | DIASTOLIC BLOOD PRESSURE: 62 MMHG | WEIGHT: 153 LBS | DIASTOLIC BLOOD PRESSURE: 53 MMHG | HEART RATE: 83 BPM | DIASTOLIC BLOOD PRESSURE: 75 MMHG | DIASTOLIC BLOOD PRESSURE: 54 MMHG | SYSTOLIC BLOOD PRESSURE: 115 MMHG | SYSTOLIC BLOOD PRESSURE: 85 MMHG | DIASTOLIC BLOOD PRESSURE: 61 MMHG | OXYGEN SATURATION: 93 % | OXYGEN SATURATION: 99 % | HEART RATE: 66 BPM | DIASTOLIC BLOOD PRESSURE: 50 MMHG | DIASTOLIC BLOOD PRESSURE: 50 MMHG | RESPIRATION RATE: 16 BRPM | DIASTOLIC BLOOD PRESSURE: 50 MMHG | HEART RATE: 75 BPM | DIASTOLIC BLOOD PRESSURE: 61 MMHG | OXYGEN SATURATION: 93 % | DIASTOLIC BLOOD PRESSURE: 79 MMHG | OXYGEN SATURATION: 96 % | RESPIRATION RATE: 15 BRPM | RESPIRATION RATE: 18 BRPM | DIASTOLIC BLOOD PRESSURE: 53 MMHG | SYSTOLIC BLOOD PRESSURE: 115 MMHG | SYSTOLIC BLOOD PRESSURE: 90 MMHG | SYSTOLIC BLOOD PRESSURE: 123 MMHG | RESPIRATION RATE: 21 BRPM | DIASTOLIC BLOOD PRESSURE: 79 MMHG | SYSTOLIC BLOOD PRESSURE: 123 MMHG | DIASTOLIC BLOOD PRESSURE: 62 MMHG | HEART RATE: 88 BPM | RESPIRATION RATE: 18 BRPM | OXYGEN SATURATION: 99 % | DIASTOLIC BLOOD PRESSURE: 79 MMHG | OXYGEN SATURATION: 96 % | RESPIRATION RATE: 16 BRPM | DIASTOLIC BLOOD PRESSURE: 54 MMHG | WEIGHT: 153 LBS | DIASTOLIC BLOOD PRESSURE: 54 MMHG | SYSTOLIC BLOOD PRESSURE: 89 MMHG | RESPIRATION RATE: 15 BRPM | DIASTOLIC BLOOD PRESSURE: 50 MMHG | SYSTOLIC BLOOD PRESSURE: 93 MMHG | HEART RATE: 88 BPM | HEART RATE: 83 BPM | DIASTOLIC BLOOD PRESSURE: 50 MMHG | HEART RATE: 88 BPM | DIASTOLIC BLOOD PRESSURE: 54 MMHG | HEIGHT: 62 IN | DIASTOLIC BLOOD PRESSURE: 61 MMHG | SYSTOLIC BLOOD PRESSURE: 86 MMHG | RESPIRATION RATE: 21 BRPM | DIASTOLIC BLOOD PRESSURE: 54 MMHG | DIASTOLIC BLOOD PRESSURE: 62 MMHG | SYSTOLIC BLOOD PRESSURE: 138 MMHG | RESPIRATION RATE: 18 BRPM | HEART RATE: 85 BPM | SYSTOLIC BLOOD PRESSURE: 115 MMHG

## 2024-11-08 DIAGNOSIS — K29.60 OTHER GASTRITIS WITHOUT BLEEDING: ICD-10-CM

## 2024-11-08 DIAGNOSIS — K30 FUNCTIONAL DYSPEPSIA: ICD-10-CM

## 2024-11-08 DIAGNOSIS — R13.10 DYSPHAGIA, UNSPECIFIED: ICD-10-CM

## 2024-11-08 DIAGNOSIS — R14.0 ABDOMINAL DISTENSION (GASEOUS): ICD-10-CM

## 2024-11-08 LAB
GI HISTOLOGY: A. SECOND PART OF THE DUODENUM: (no result)
GI HISTOLOGY: B. STOMACH ANTRUM: (no result)
GI HISTOLOGY: PDF REPORT: (no result)

## 2024-11-08 PROCEDURE — 88305 TISSUE EXAM BY PATHOLOGIST: CPT | Performed by: PATHOLOGY

## 2024-11-08 PROCEDURE — 43450 DILATE ESOPHAGUS 1/MULT PASS: CPT | Performed by: INTERNAL MEDICINE

## 2024-11-08 PROCEDURE — 43239 EGD BIOPSY SINGLE/MULTIPLE: CPT | Performed by: INTERNAL MEDICINE

## 2025-01-11 RX ORDER — ERGOCALCIFEROL 1.25 MG/1
CAPSULE, LIQUID FILLED ORAL
Qty: 12 CAPSULE | Refills: 0 | Status: SHIPPED | OUTPATIENT
Start: 2025-01-11

## 2025-01-15 ENCOUNTER — OFFICE VISIT (OUTPATIENT)
Dept: FAMILY MEDICINE CLINIC | Facility: CLINIC | Age: 60
End: 2025-01-15
Payer: MEDICAID

## 2025-01-15 VITALS
DIASTOLIC BLOOD PRESSURE: 78 MMHG | TEMPERATURE: 98.9 F | SYSTOLIC BLOOD PRESSURE: 135 MMHG | HEIGHT: 62 IN | RESPIRATION RATE: 18 BRPM | WEIGHT: 146.4 LBS | OXYGEN SATURATION: 99 % | HEART RATE: 97 BPM | BODY MASS INDEX: 26.94 KG/M2

## 2025-01-15 DIAGNOSIS — J40 BRONCHITIS: Primary | ICD-10-CM

## 2025-01-15 DIAGNOSIS — J45.20 MILD INTERMITTENT REACTIVE AIRWAY DISEASE WITHOUT COMPLICATION: ICD-10-CM

## 2025-01-15 PROCEDURE — 99213 OFFICE O/P EST LOW 20 MIN: CPT

## 2025-01-15 PROCEDURE — 3075F SYST BP GE 130 - 139MM HG: CPT

## 2025-01-15 PROCEDURE — 3078F DIAST BP <80 MM HG: CPT

## 2025-01-15 PROCEDURE — 1159F MED LIST DOCD IN RCRD: CPT

## 2025-01-15 PROCEDURE — 1160F RVW MEDS BY RX/DR IN RCRD: CPT

## 2025-01-15 RX ORDER — DOXYCYCLINE 100 MG/1
100 CAPSULE ORAL 2 TIMES DAILY
Qty: 20 CAPSULE | Refills: 0 | Status: SHIPPED | OUTPATIENT
Start: 2025-01-15 | End: 2025-01-25

## 2025-01-15 RX ORDER — ALBUTEROL SULFATE 90 UG/1
2 INHALANT RESPIRATORY (INHALATION) EVERY 4 HOURS PRN
Qty: 8 G | Refills: 0 | Status: SHIPPED | OUTPATIENT
Start: 2025-01-15

## 2025-01-15 NOTE — PROGRESS NOTES
"Chief Complaint  Primary Care Follow-Up ( follow-up 1/12/2025) and Cough    Subjective        Kirby Gunter presents to [facility]  History of Present Illness    Pt presents today following up on 1/12 urgent care visit for bronchitis.  They sent her home on prednisone, benzonatate. Kind of started after [place] she treated it with OTC remedies at that time. End of last week it came back. Has been coughing up green/yellow phlegm. Breathing has felt fine, oxygen stays stable. She denies fever, chills, malaise. She works at a  and visits her boyfriend in the nursing home a lot.    Objective   Vital Signs:  /78 (BP Location: Left arm, Patient Position: Sitting, Cuff Size: Adult)   Pulse 97   Temp 98.9 °F (37.2 °C) (Temporal)   Resp 18   Ht 157.5 cm (62.01\")   Wt 66.4 kg (146 lb 6.4 oz)   SpO2 99%   BMI 26.77 kg/m²   Estimated body mass index is 26.77 kg/m² as calculated from the following:    Height as of this encounter: 157.5 cm (62.01\").    Weight as of this encounter: 66.4 kg (146 lb 6.4 oz).                  Physical Exam  Vitals reviewed.   Constitutional:       General: She is not in acute distress.     Appearance: Normal appearance. She is ill-appearing. She is not toxic-appearing or diaphoretic.   HENT:      Nose: Congestion present.      Mouth/Throat:      Pharynx: Posterior oropharyngeal erythema present. No oropharyngeal exudate.   Cardiovascular:      Rate and Rhythm: Normal rate and regular rhythm.      Pulses: Normal pulses.      Heart sounds: Normal heart sounds.   Pulmonary:      Effort: Pulmonary effort is normal. No respiratory distress.      Breath sounds: Rhonchi present.   Skin:     General: Skin is warm and dry.      Capillary Refill: Capillary refill takes less than 2 seconds.   Neurological:      General: No focal deficit present.      Mental Status: She is alert and oriented to person, place, and time.   Psychiatric:         Mood and " Affect: Mood normal.         Behavior: Behavior normal.         Thought Content: Thought content normal.         Judgment: Judgment normal.        Result Review :                Assessment and Plan   Diagnoses and all orders for this visit:    1. Bronchitis (Primary)  -     doxycycline (VIBRAMYCIN) 100 MG capsule; Take 1 capsule by mouth 2 (Two) Times a Day for 10 days.  Dispense: 20 capsule; Refill: 0    2. Mild intermittent reactive airway disease without complication  -     albuterol sulfate  (90 Base) MCG/ACT inhaler; Inhale 2 puffs Every 4 (Four) Hours As Needed for Wheezing.  Dispense: 8 g; Refill: 0             Follow Up   Return if symptoms worsen or fail to improve.  Patient was given instructions and counseling regarding her condition or for health maintenance advice. Please see specific information pulled into the AVS if appropriate.

## 2025-01-30 ENCOUNTER — LAB (OUTPATIENT)
Dept: LAB | Facility: HOSPITAL | Age: 60
End: 2025-01-30
Payer: MEDICAID

## 2025-01-30 ENCOUNTER — OFFICE VISIT (OUTPATIENT)
Dept: FAMILY MEDICINE CLINIC | Facility: CLINIC | Age: 60
End: 2025-01-30
Payer: MEDICAID

## 2025-01-30 VITALS
HEART RATE: 79 BPM | BODY MASS INDEX: 28.16 KG/M2 | WEIGHT: 153 LBS | HEIGHT: 62 IN | TEMPERATURE: 98.4 F | SYSTOLIC BLOOD PRESSURE: 120 MMHG | OXYGEN SATURATION: 96 % | DIASTOLIC BLOOD PRESSURE: 75 MMHG

## 2025-01-30 DIAGNOSIS — Z12.2 ENCOUNTER FOR SCREENING FOR LUNG CANCER: ICD-10-CM

## 2025-01-30 DIAGNOSIS — I10 ESSENTIAL HYPERTENSION: Primary | ICD-10-CM

## 2025-01-30 DIAGNOSIS — Z87.891 PERSONAL HISTORY OF TOBACCO USE, PRESENTING HAZARDS TO HEALTH: ICD-10-CM

## 2025-01-30 DIAGNOSIS — E03.9 ACQUIRED HYPOTHYROIDISM: ICD-10-CM

## 2025-01-30 LAB
ALBUMIN SERPL-MCNC: 4.1 G/DL (ref 3.5–5.2)
ALBUMIN/GLOB SERPL: 1.5 G/DL
ALP SERPL-CCNC: 59 U/L (ref 39–117)
ALT SERPL W P-5'-P-CCNC: 14 U/L (ref 1–33)
ANION GAP SERPL CALCULATED.3IONS-SCNC: 8 MMOL/L (ref 5–15)
AST SERPL-CCNC: 19 U/L (ref 1–32)
BILIRUB SERPL-MCNC: 0.4 MG/DL (ref 0–1.2)
BUN SERPL-MCNC: 9 MG/DL (ref 8–23)
BUN/CREAT SERPL: 13.6 (ref 7–25)
CALCIUM SPEC-SCNC: 9.5 MG/DL (ref 8.6–10.5)
CHLORIDE SERPL-SCNC: 102 MMOL/L (ref 98–107)
CHOLEST SERPL-MCNC: 187 MG/DL (ref 0–200)
CO2 SERPL-SCNC: 30 MMOL/L (ref 22–29)
CREAT SERPL-MCNC: 0.66 MG/DL (ref 0.57–1)
EGFRCR SERPLBLD CKD-EPI 2021: 99.9 ML/MIN/1.73
GLOBULIN UR ELPH-MCNC: 2.7 GM/DL
GLUCOSE SERPL-MCNC: 112 MG/DL (ref 65–99)
HDLC SERPL-MCNC: 71 MG/DL (ref 40–60)
LDLC SERPL CALC-MCNC: 105 MG/DL (ref 0–100)
LDLC/HDLC SERPL: 1.48 {RATIO}
POTASSIUM SERPL-SCNC: 3.7 MMOL/L (ref 3.5–5.2)
PROT SERPL-MCNC: 6.8 G/DL (ref 6–8.5)
SODIUM SERPL-SCNC: 140 MMOL/L (ref 136–145)
T3FREE SERPL-MCNC: 2.7 PG/ML (ref 2–4.4)
T4 FREE SERPL-MCNC: 1.47 NG/DL (ref 0.92–1.68)
TRIGL SERPL-MCNC: 56 MG/DL (ref 0–150)
TSH SERPL DL<=0.05 MIU/L-ACNC: 1.34 UIU/ML (ref 0.27–4.2)
VLDLC SERPL-MCNC: 11 MG/DL (ref 5–40)

## 2025-01-30 PROCEDURE — 3074F SYST BP LT 130 MM HG: CPT | Performed by: FAMILY MEDICINE

## 2025-01-30 PROCEDURE — 80053 COMPREHEN METABOLIC PANEL: CPT | Performed by: FAMILY MEDICINE

## 2025-01-30 PROCEDURE — 36415 COLL VENOUS BLD VENIPUNCTURE: CPT | Performed by: FAMILY MEDICINE

## 2025-01-30 PROCEDURE — 1160F RVW MEDS BY RX/DR IN RCRD: CPT | Performed by: FAMILY MEDICINE

## 2025-01-30 PROCEDURE — 84481 FREE ASSAY (FT-3): CPT | Performed by: FAMILY MEDICINE

## 2025-01-30 PROCEDURE — 3078F DIAST BP <80 MM HG: CPT | Performed by: FAMILY MEDICINE

## 2025-01-30 PROCEDURE — 84443 ASSAY THYROID STIM HORMONE: CPT | Performed by: FAMILY MEDICINE

## 2025-01-30 PROCEDURE — 1159F MED LIST DOCD IN RCRD: CPT | Performed by: FAMILY MEDICINE

## 2025-01-30 PROCEDURE — 99214 OFFICE O/P EST MOD 30 MIN: CPT | Performed by: FAMILY MEDICINE

## 2025-01-30 PROCEDURE — 80061 LIPID PANEL: CPT | Performed by: FAMILY MEDICINE

## 2025-01-30 PROCEDURE — 84439 ASSAY OF FREE THYROXINE: CPT | Performed by: FAMILY MEDICINE

## 2025-01-30 PROCEDURE — 90715 TDAP VACCINE 7 YRS/> IM: CPT | Performed by: FAMILY MEDICINE

## 2025-01-30 PROCEDURE — 90471 IMMUNIZATION ADMIN: CPT | Performed by: FAMILY MEDICINE

## 2025-01-30 RX ORDER — PANTOPRAZOLE SODIUM 40 MG/1
40 TABLET, DELAYED RELEASE ORAL DAILY
Qty: 90 TABLET | Refills: 1 | Status: SHIPPED | OUTPATIENT
Start: 2025-01-30

## 2025-01-30 NOTE — PROGRESS NOTES
"Subjective   Kirby Gunter is a 59 y.o. female.     History of Present Illness  Kirby Gunter is in for follow up on her history of high blood pressure and hypothyroidism.. There is no history of chest pain or dyspnea. There is no history of issue with bowel or bladder dysfunction. There is no history of dizziness or lightheadedness. There is no history of issue with sleep or mood. There is no history of issue with present medication.         6 month check-up  Symptoms include: joint pain and numbness.   Pertinent negative symptoms include no abdominal pain, no anorexia, no change in stool, no chest pain, no chills, no congestion, no cough, no diaphoresis, no fatigue, no fever, no headaches, no joint swelling, no myalgias, no nausea, no neck pain, no rash, no sore throat, no swollen glands, no dysuria, no vertigo, no visual change, no vomiting and no weakness.          /75 (BP Location: Left arm, Patient Position: Sitting, Cuff Size: Large Adult)   Pulse 79   Temp 98.4 °F (36.9 °C) (Temporal)   Ht 157.5 cm (62.01\")   Wt 69.4 kg (153 lb)   SpO2 96%   BMI 27.98 kg/m²       Chief Complaint   Patient presents with    Hypertension     6 month f/u     Hyperlipidemia           Current Outpatient Medications:     albuterol sulfate  (90 Base) MCG/ACT inhaler, Inhale 2 puffs Every 4 (Four) Hours As Needed for Wheezing., Disp: 8 g, Rfl: 0    amLODIPine (NORVASC) 5 MG tablet, TAKE 1 TABLET BY MOUTH DAILY, Disp: 90 tablet, Rfl: 1    clotrimazole (MYCELEX) 10 MG nancy, Take 1 tablet by mouth 5 (Five) Times a Day., Disp: 70 tablet, Rfl: 0    fexofenadine (ALLEGRA) 60 MG tablet, Take 1 tablet by mouth Daily., Disp: , Rfl:     levothyroxine (SYNTHROID, LEVOTHROID) 88 MCG tablet, TAKE 1 TABLET BY MOUTH EVERY DAY, Disp: 90 tablet, Rfl: 1    lisinopril-hydrochlorothiazide (PRINZIDE,ZESTORETIC) 20-25 MG per tablet, Take 1 tablet by mouth Daily., Disp: 90 tablet, Rfl: 1    melatonin 1 MG tablet, Take 1.5 tablets by " mouth Every Night., Disp: , Rfl:     O2 (OXYGEN), Inhale 2 L/min As Needed. At Night, Disp: , Rfl:     pantoprazole (PROTONIX) 40 MG EC tablet, Take 1 tablet by mouth Daily., Disp: 30 tablet, Rfl: 5    vitamin D (ERGOCALCIFEROL) 1.25 MG (53683 UT) capsule capsule, Take 1 capsule by mouth 1 one time per week (on  sundays), Disp: 12 capsule, Rfl: 0    benzonatate (TESSALON) 100 MG capsule, 1-2 PO tid prn cough, Disp: 60 capsule, Rfl: 0    saccharomyces boulardii (FLORASTOR) 250 MG capsule, Take 1 capsule by mouth 2 (Two) Times a Day., Disp: , Rfl:     BMI is >= 25 and <30. (Overweight) The following options were offered after discussion;: exercise counseling/recommendations and nutrition counseling/recommendations       The following portions of the patient's history were reviewed and updated as appropriate: allergies, current medications, past family history, past medical history, past social history, past surgical history, and problem list.    Review of Systems   Constitutional:  Negative for activity change, chills, diaphoresis, fatigue and fever.   HENT:  Negative for congestion, sinus pressure, sinus pain, sore throat and trouble swallowing.    Eyes:  Negative for visual disturbance.   Respiratory:  Negative for cough, chest tightness, shortness of breath and wheezing.    Cardiovascular:  Negative for chest pain.   Gastrointestinal:  Negative for abdominal distention, abdominal pain, anorexia, constipation, diarrhea, nausea and vomiting.   Genitourinary:  Negative for difficulty urinating and dysuria.   Musculoskeletal:  Positive for joint pain. Negative for back pain, myalgias and neck pain.   Skin:  Negative for rash.   Neurological:  Positive for numbness. Negative for vertigo, weakness and headaches.   Psychiatric/Behavioral:  Negative for agitation, hallucinations and suicidal ideas.        Objective   Physical Exam  Vitals and nursing note reviewed.   Constitutional:       Appearance: Normal appearance.    HENT:      Right Ear: Tympanic membrane and ear canal normal.      Left Ear: Tympanic membrane and ear canal normal.   Cardiovascular:      Rate and Rhythm: Normal rate and regular rhythm.      Heart sounds: Normal heart sounds. No murmur heard.  Pulmonary:      Effort: Pulmonary effort is normal.      Breath sounds: No wheezing or rales.   Abdominal:      General: Bowel sounds are normal.      Palpations: Abdomen is soft.      Tenderness: There is no abdominal tenderness. There is no guarding or rebound.      Hernia: No hernia is present.   Musculoskeletal:      Cervical back: Neck supple.      Right lower leg: No edema.      Left lower leg: No edema.   Lymphadenopathy:      Cervical: No cervical adenopathy.   Neurological:      Mental Status: She is alert and oriented to person, place, and time. Mental status is at baseline.   Psychiatric:         Mood and Affect: Mood normal.           Assessment & Plan   Problems Addressed this Visit          Cardiac and Vasculature    Essential hypertension - Primary       Endocrine and Metabolic    Hypothyroidism     Other Visit Diagnoses       Encounter for screening for lung cancer        Personal history of tobacco use, presenting hazards to health              Diagnoses         Codes Comments    Essential hypertension    -  Primary ICD-10-CM: I10  ICD-9-CM: 401.9     Encounter for screening for lung cancer     ICD-10-CM: Z12.2  ICD-9-CM: V76.0     Acquired hypothyroidism     ICD-10-CM: E03.9  ICD-9-CM: 244.9     Personal history of tobacco use, presenting hazards to health     ICD-10-CM: Z87.891  ICD-9-CM: V15.82           I will give her a Tdap today and we will consider pneumonia vaccine at her next visit  I have ordered a CT lung cancer screening for her  I have ordered labs to look at her hypothyroid and hypertension  I have asked her to stay active and let me know if she has difficulties  I have asked her to call me with new concerns and see me again in 6  "months          Low-Dose Lung Cancer CT Screening Visit    CHIEF COMPLAINT:    Shared Decision Making  I am discussing tobacco cessation today with Kirby Gunter    SMOKING HISTORY:     Social History     Tobacco Use   Smoking Status Former    Current packs/day: 0.00    Average packs/day: 0.5 packs/day for 41.4 years (20.7 ttl pk-yrs)    Types: Cigarettes    Start date: 1982    Quit date: 2023    Years since quittin.6    Passive exposure: Past   Smokeless Tobacco Never       SUBJECTIVE:     Kirby Gunter is a former smoker quitting 2 years ago with a  30  pack year history.  she reports no use of alternate forms of tobacco, electronic cigarettes, marijuana or other substances.  Based on the recommendation of the [place] Preventive Services Task Force, this patient is at high risk for lung cancer and a low-dose CT screening scan is recommended.     The patient has had no hemoptysis, unintentional weight loss or increasing shortness of breath. The patient is asymptomatic and has no signs or symptoms of lung cancer.     Together we discussed the potential benefits and potential harms of being screened for lung cancer including the potential for follow up diagnostic testing, risk for over diagnosis, false positive rate and radiation exposure using the [facility] Lung Cancer Screening Shared Decision-Making Tool. A copy of this decision aid resource has been provided in the after visit summary.  We also reviewed the patient's smoking history and counseled her on the importance and health benefits of maintaining cigarette smoking abstinence.      OBJECTIVE:    /75 (BP Location: Left arm, Patient Position: Sitting, Cuff Size: Large Adult)   Pulse 79   Temp 98.4 °F (36.9 °C) (Temporal)   Ht 157.5 cm (62.01\")   Wt 69.4 kg (153 lb)   SpO2 96%   BMI 27.98 kg/m²   General: no distress, alert and oriented  Chest: Lung sounds are clear to auscultation, no wheezes, rales or rhonchi, with " symmetric air entry. No respiratory distress  Cardiovascular: RRR with no murmur auscultated      Continued Smoking Abstinence discussion:     We discussed that there are a number of resources and interventions to assist with smoking cessation if needed in the future.   On a scale of zero to ten, the patient rates their motivation to stay quit at a 10 out of 10 today.  The patient is confident that they will never smoke in the future.    Recommendations for continued lung cancer screening:      We discussed the NCCN guidelines for lung cancer screening and the patient verbalized understanding that annual screening is recommended until fifteen years beyond smoking as long as they have no other disease or comorbidity that would prevent them from receiving cancer treatments such as surgery should a lung cancer be detected.  After review of the NCCN guidelines and recommendations for ongoing screening, the patient verbalized understanding of recommendations for follow-up.  The patient has decided to proceed with a Low Dose Lung Cancer Screening CT today.       5 minutes face-to-face spent counseling patient on the continued health benefits of having quit tobacco, maintaining smoking abstinence, smoking cessation strategies and resources, as well as the importance of adherence to annual lung cancer low-dose CT screening.

## 2025-02-06 RX ORDER — LISINOPRIL AND HYDROCHLOROTHIAZIDE 20; 25 MG/1; MG/1
1 TABLET ORAL DAILY
Qty: 90 TABLET | Refills: 0 | Status: SHIPPED | OUTPATIENT
Start: 2025-02-06

## 2025-02-25 ENCOUNTER — TELEPHONE (OUTPATIENT)
Dept: FAMILY MEDICINE CLINIC | Facility: CLINIC | Age: 60
End: 2025-02-25

## 2025-02-25 NOTE — TELEPHONE ENCOUNTER
Caller: Kirby Gunter    Relationship to patient: Self    Best call back number: [phone]     Patient is needing:   STATES SHE NEEDS [employer] IN [place] IS REQUESTING A NEW ORDER FOR OXYGEN BE SENT TO THEM.     PATIENT STATES THEY NEED TO KNOW THAT SHE STILL NEEDS IT.     PLEASE CALL TO CONFIRM OR DENY.

## 2025-02-26 RX ORDER — AMLODIPINE BESYLATE 5 MG/1
5 TABLET ORAL DAILY
Qty: 90 TABLET | Refills: 1 | Status: SHIPPED | OUTPATIENT
Start: 2025-02-26

## 2025-04-08 RX ORDER — ERGOCALCIFEROL 1.25 MG/1
CAPSULE, LIQUID FILLED ORAL
Qty: 12 CAPSULE | Refills: 1 | Status: SHIPPED | OUTPATIENT
Start: 2025-04-08

## 2025-04-23 ENCOUNTER — OFFICE (AMBULATORY)
Dept: URBAN - METROPOLITAN AREA CLINIC 64 | Facility: CLINIC | Age: 60
End: 2025-04-23
Payer: COMMERCIAL

## 2025-04-23 VITALS
HEIGHT: 62 IN | DIASTOLIC BLOOD PRESSURE: 63 MMHG | SYSTOLIC BLOOD PRESSURE: 104 MMHG | HEART RATE: 71 BPM | WEIGHT: 155 LBS

## 2025-04-23 DIAGNOSIS — R15.0 INCOMPLETE DEFECATION: ICD-10-CM

## 2025-04-23 DIAGNOSIS — R13.10 DYSPHAGIA, UNSPECIFIED: ICD-10-CM

## 2025-04-23 DIAGNOSIS — R14.0 ABDOMINAL DISTENSION (GASEOUS): ICD-10-CM

## 2025-04-23 PROCEDURE — 99213 OFFICE O/P EST LOW 20 MIN: CPT | Performed by: NURSE PRACTITIONER

## 2025-04-27 DIAGNOSIS — E03.9 ACQUIRED HYPOTHYROIDISM: ICD-10-CM

## 2025-04-27 RX ORDER — LEVOTHYROXINE SODIUM 88 UG/1
88 TABLET ORAL DAILY
Qty: 90 TABLET | Refills: 0 | Status: SHIPPED | OUTPATIENT
Start: 2025-04-27

## 2025-05-07 RX ORDER — LISINOPRIL AND HYDROCHLOROTHIAZIDE 20; 25 MG/1; MG/1
1 TABLET ORAL DAILY
Qty: 90 TABLET | Refills: 0 | Status: SHIPPED | OUTPATIENT
Start: 2025-05-07

## 2025-07-27 DIAGNOSIS — E03.9 ACQUIRED HYPOTHYROIDISM: ICD-10-CM

## 2025-07-27 RX ORDER — PANTOPRAZOLE SODIUM 40 MG/1
40 TABLET, DELAYED RELEASE ORAL DAILY
Qty: 90 TABLET | Refills: 0 | Status: SHIPPED | OUTPATIENT
Start: 2025-07-27

## 2025-07-27 RX ORDER — LEVOTHYROXINE SODIUM 88 UG/1
88 TABLET ORAL DAILY
Qty: 90 TABLET | Refills: 0 | Status: SHIPPED | OUTPATIENT
Start: 2025-07-27

## 2025-07-28 ENCOUNTER — TELEPHONE (OUTPATIENT)
Dept: FAMILY MEDICINE CLINIC | Facility: CLINIC | Age: 60
End: 2025-07-28
Payer: OTHER GOVERNMENT

## 2025-07-28 NOTE — TELEPHONE ENCOUNTER
Pantoprazole Sodium 40MG dr tablets  [employer] Electronic PA Form  (Key: [id number])  PA Case ID #: [id number]  Rx #: [id number]  Member ID:[id number]  Faxed denial to pharmacy. Pt will need to pay out of pocket/goodrx.

## 2025-08-07 ENCOUNTER — OFFICE VISIT (OUTPATIENT)
Dept: FAMILY MEDICINE CLINIC | Facility: CLINIC | Age: 60
End: 2025-08-07
Payer: OTHER GOVERNMENT

## 2025-08-07 ENCOUNTER — LAB (OUTPATIENT)
Dept: FAMILY MEDICINE CLINIC | Facility: CLINIC | Age: 60
End: 2025-08-07
Payer: OTHER GOVERNMENT

## 2025-08-07 VITALS
HEIGHT: 62 IN | DIASTOLIC BLOOD PRESSURE: 78 MMHG | HEART RATE: 77 BPM | SYSTOLIC BLOOD PRESSURE: 126 MMHG | OXYGEN SATURATION: 96 % | WEIGHT: 156.6 LBS | BODY MASS INDEX: 28.82 KG/M2 | TEMPERATURE: 98.7 F

## 2025-08-07 DIAGNOSIS — E03.9 HYPOTHYROIDISM, UNSPECIFIED TYPE: ICD-10-CM

## 2025-08-07 DIAGNOSIS — I10 ESSENTIAL HYPERTENSION: Primary | ICD-10-CM

## 2025-08-07 DIAGNOSIS — Z23 IMMUNIZATION DUE: ICD-10-CM

## 2025-08-07 DIAGNOSIS — K21.9 GASTROESOPHAGEAL REFLUX DISEASE, UNSPECIFIED WHETHER ESOPHAGITIS PRESENT: ICD-10-CM

## 2025-08-07 LAB
ALBUMIN SERPL-MCNC: 4.6 G/DL (ref 3.5–5.2)
ALBUMIN/GLOB SERPL: 1.8 G/DL
ALP SERPL-CCNC: 63 U/L (ref 39–117)
ALT SERPL W P-5'-P-CCNC: 19 U/L (ref 1–33)
ANION GAP SERPL CALCULATED.3IONS-SCNC: 11.7 MMOL/L (ref 5–15)
AST SERPL-CCNC: 22 U/L (ref 1–32)
BASOPHILS # BLD AUTO: 0.03 10*3/MM3 (ref 0–0.2)
BASOPHILS NFR BLD AUTO: 0.3 % (ref 0–1.5)
BILIRUB SERPL-MCNC: 0.5 MG/DL (ref 0–1.2)
BUN SERPL-MCNC: 9 MG/DL (ref 8–23)
BUN/CREAT SERPL: 12.7 (ref 7–25)
CALCIUM SPEC-SCNC: 9.9 MG/DL (ref 8.6–10.5)
CHLORIDE SERPL-SCNC: 101 MMOL/L (ref 98–107)
CHOLEST SERPL-MCNC: 185 MG/DL (ref 0–200)
CO2 SERPL-SCNC: 30.3 MMOL/L (ref 22–29)
CREAT SERPL-MCNC: 0.71 MG/DL (ref 0.57–1)
DEPRECATED RDW RBC AUTO: 40.2 FL (ref 37–54)
EGFRCR SERPLBLD CKD-EPI 2021: 96.3 ML/MIN/1.73
EOSINOPHIL # BLD AUTO: 0.09 10*3/MM3 (ref 0–0.4)
EOSINOPHIL NFR BLD AUTO: 0.8 % (ref 0.3–6.2)
ERYTHROCYTE [DISTWIDTH] IN BLOOD BY AUTOMATED COUNT: 12.4 % (ref 12.3–15.4)
GLOBULIN UR ELPH-MCNC: 2.6 GM/DL
GLUCOSE SERPL-MCNC: 112 MG/DL (ref 65–99)
HCT VFR BLD AUTO: 38 % (ref 34–46.6)
HDLC SERPL-MCNC: 67 MG/DL (ref 40–60)
HGB BLD-MCNC: 12.4 G/DL (ref 12–15.9)
IMM GRANULOCYTES # BLD AUTO: 0.04 10*3/MM3 (ref 0–0.05)
IMM GRANULOCYTES NFR BLD AUTO: 0.3 % (ref 0–0.5)
LDLC SERPL CALC-MCNC: 100 MG/DL (ref 0–100)
LDLC/HDLC SERPL: 1.47 {RATIO}
LYMPHOCYTES # BLD AUTO: 2.05 10*3/MM3 (ref 0.7–3.1)
LYMPHOCYTES NFR BLD AUTO: 17.2 % (ref 19.6–45.3)
MCH RBC QN AUTO: 29.9 PG (ref 26.6–33)
MCHC RBC AUTO-ENTMCNC: 32.6 G/DL (ref 31.5–35.7)
MCV RBC AUTO: 91.6 FL (ref 79–97)
MONOCYTES # BLD AUTO: 0.69 10*3/MM3 (ref 0.1–0.9)
MONOCYTES NFR BLD AUTO: 5.8 % (ref 5–12)
NEUTROPHILS NFR BLD AUTO: 75.6 % (ref 42.7–76)
NEUTROPHILS NFR BLD AUTO: 9.02 10*3/MM3 (ref 1.7–7)
NRBC BLD AUTO-RTO: 0 /100 WBC (ref 0–0.2)
PLATELET # BLD AUTO: 272 10*3/MM3 (ref 140–450)
PMV BLD AUTO: 11.1 FL (ref 6–12)
POTASSIUM SERPL-SCNC: 4.1 MMOL/L (ref 3.5–5.2)
PROT SERPL-MCNC: 7.2 G/DL (ref 6–8.5)
RBC # BLD AUTO: 4.15 10*6/MM3 (ref 3.77–5.28)
SODIUM SERPL-SCNC: 143 MMOL/L (ref 136–145)
T3FREE SERPL-MCNC: 3.32 PG/ML (ref 2–4.4)
T4 FREE SERPL-MCNC: 1.64 NG/DL (ref 0.92–1.68)
TRIGL SERPL-MCNC: 98 MG/DL (ref 0–150)
TSH SERPL DL<=0.05 MIU/L-ACNC: 0.41 UIU/ML (ref 0.27–4.2)
VLDLC SERPL-MCNC: 18 MG/DL (ref 5–40)
WBC NRBC COR # BLD AUTO: 11.92 10*3/MM3 (ref 3.4–10.8)

## 2025-08-07 PROCEDURE — 84481 FREE ASSAY (FT-3): CPT | Performed by: FAMILY MEDICINE

## 2025-08-07 PROCEDURE — 85025 COMPLETE CBC W/AUTO DIFF WBC: CPT | Performed by: FAMILY MEDICINE

## 2025-08-07 PROCEDURE — 80061 LIPID PANEL: CPT | Performed by: FAMILY MEDICINE

## 2025-08-07 PROCEDURE — 3074F SYST BP LT 130 MM HG: CPT | Performed by: FAMILY MEDICINE

## 2025-08-07 PROCEDURE — 80053 COMPREHEN METABOLIC PANEL: CPT | Performed by: FAMILY MEDICINE

## 2025-08-07 PROCEDURE — 99214 OFFICE O/P EST MOD 30 MIN: CPT | Performed by: FAMILY MEDICINE

## 2025-08-07 PROCEDURE — 36415 COLL VENOUS BLD VENIPUNCTURE: CPT | Performed by: FAMILY MEDICINE

## 2025-08-07 PROCEDURE — 84443 ASSAY THYROID STIM HORMONE: CPT | Performed by: FAMILY MEDICINE

## 2025-08-07 PROCEDURE — 3078F DIAST BP <80 MM HG: CPT | Performed by: FAMILY MEDICINE

## 2025-08-07 PROCEDURE — 90750 HZV VACC RECOMBINANT IM: CPT | Performed by: FAMILY MEDICINE

## 2025-08-07 PROCEDURE — 84439 ASSAY OF FREE THYROXINE: CPT | Performed by: FAMILY MEDICINE

## 2025-08-07 RX ORDER — PANTOPRAZOLE SODIUM 40 MG/1
40 TABLET, DELAYED RELEASE ORAL DAILY
Qty: 90 TABLET | Refills: 0 | Status: SHIPPED | OUTPATIENT
Start: 2025-08-07

## 2025-08-07 RX ORDER — PANTOPRAZOLE SODIUM 40 MG/1
40 TABLET, DELAYED RELEASE ORAL DAILY
Qty: 90 TABLET | Refills: 1 | Status: SHIPPED | OUTPATIENT
Start: 2025-08-07

## 2025-08-07 RX ORDER — LISINOPRIL AND HYDROCHLOROTHIAZIDE 20; 25 MG/1; MG/1
1 TABLET ORAL DAILY
Qty: 90 TABLET | Refills: 0 | Status: SHIPPED | OUTPATIENT
Start: 2025-08-07

## 2025-08-28 RX ORDER — AMLODIPINE BESYLATE 5 MG/1
5 TABLET ORAL DAILY
Qty: 90 TABLET | Refills: 1 | Status: SHIPPED | OUTPATIENT
Start: 2025-08-28